# Patient Record
Sex: MALE | Race: WHITE | Employment: OTHER | URBAN - METROPOLITAN AREA
[De-identification: names, ages, dates, MRNs, and addresses within clinical notes are randomized per-mention and may not be internally consistent; named-entity substitution may affect disease eponyms.]

---

## 2017-01-06 ENCOUNTER — TELEPHONE (OUTPATIENT)
Dept: CARDIOLOGY CLINIC | Age: 82
End: 2017-01-06

## 2017-01-16 ASSESSMENT — ENCOUNTER SYMPTOMS
ABDOMINAL PAIN: 0
SHORTNESS OF BREATH: 0

## 2017-01-18 ENCOUNTER — OFFICE VISIT (OUTPATIENT)
Dept: INTERNAL MEDICINE CLINIC | Age: 82
End: 2017-01-18

## 2017-01-18 VITALS
DIASTOLIC BLOOD PRESSURE: 80 MMHG | RESPIRATION RATE: 16 BRPM | HEIGHT: 73 IN | BODY MASS INDEX: 25.84 KG/M2 | WEIGHT: 195 LBS | SYSTOLIC BLOOD PRESSURE: 130 MMHG | HEART RATE: 84 BPM

## 2017-01-18 DIAGNOSIS — I50.23 ACUTE ON CHRONIC SYSTOLIC CONGESTIVE HEART FAILURE (HCC): Primary | ICD-10-CM

## 2017-01-18 DIAGNOSIS — I10 ESSENTIAL HYPERTENSION: ICD-10-CM

## 2017-01-18 DIAGNOSIS — L98.9 SKIN LESION OF FACE: ICD-10-CM

## 2017-01-18 DIAGNOSIS — E78.00 HYPERCHOLESTEROLEMIA: ICD-10-CM

## 2017-01-18 LAB
A/G RATIO: 1.9 (ref 1.1–2.2)
ALBUMIN SERPL-MCNC: 4.4 G/DL (ref 3.4–5)
ALP BLD-CCNC: 78 U/L (ref 40–129)
ALT SERPL-CCNC: 10 U/L (ref 10–40)
ANION GAP SERPL CALCULATED.3IONS-SCNC: 15 MMOL/L (ref 3–16)
AST SERPL-CCNC: 18 U/L (ref 15–37)
BILIRUB SERPL-MCNC: 0.6 MG/DL (ref 0–1)
BUN BLDV-MCNC: 24 MG/DL (ref 7–20)
CALCIUM SERPL-MCNC: 8.9 MG/DL (ref 8.3–10.6)
CHLORIDE BLD-SCNC: 101 MMOL/L (ref 99–110)
CHOLESTEROL, TOTAL: 229 MG/DL (ref 0–199)
CO2: 24 MMOL/L (ref 21–32)
CREAT SERPL-MCNC: 1 MG/DL (ref 0.8–1.3)
GFR AFRICAN AMERICAN: >60
GFR NON-AFRICAN AMERICAN: >60
GLOBULIN: 2.3 G/DL
GLUCOSE BLD-MCNC: 96 MG/DL (ref 70–99)
HDLC SERPL-MCNC: 50 MG/DL (ref 40–60)
LDL CHOLESTEROL CALCULATED: 156 MG/DL
POTASSIUM SERPL-SCNC: 4.3 MMOL/L (ref 3.5–5.1)
SODIUM BLD-SCNC: 140 MMOL/L (ref 136–145)
TOTAL PROTEIN: 6.7 G/DL (ref 6.4–8.2)
TRIGL SERPL-MCNC: 114 MG/DL (ref 0–150)
VLDLC SERPL CALC-MCNC: 23 MG/DL

## 2017-01-18 PROCEDURE — 99214 OFFICE O/P EST MOD 30 MIN: CPT | Performed by: INTERNAL MEDICINE

## 2017-01-19 DIAGNOSIS — E78.00 HYPERCHOLESTEROLEMIA: Primary | ICD-10-CM

## 2017-02-07 ENCOUNTER — NURSE ONLY (OUTPATIENT)
Dept: CARDIOLOGY CLINIC | Age: 82
End: 2017-02-07

## 2017-02-07 DIAGNOSIS — I50.23 ACUTE ON CHRONIC SYSTOLIC CONGESTIVE HEART FAILURE (HCC): ICD-10-CM

## 2017-02-07 DIAGNOSIS — Z95.810 BIVENTRICULAR ICD (IMPLANTABLE CARDIOVERTER-DEFIBRILLATOR) IN PLACE: Primary | ICD-10-CM

## 2017-02-07 DIAGNOSIS — I42.9 CARDIOMYOPATHY (HCC): ICD-10-CM

## 2017-02-07 PROCEDURE — 93296 REM INTERROG EVL PM/IDS: CPT | Performed by: INTERNAL MEDICINE

## 2017-02-07 PROCEDURE — 93297 REM INTERROG DEV EVAL ICPMS: CPT | Performed by: INTERNAL MEDICINE

## 2017-02-07 PROCEDURE — 93295 DEV INTERROG REMOTE 1/2/MLT: CPT | Performed by: INTERNAL MEDICINE

## 2017-02-08 ENCOUNTER — OFFICE VISIT (OUTPATIENT)
Dept: CARDIOLOGY CLINIC | Age: 82
End: 2017-02-08

## 2017-02-08 VITALS
HEART RATE: 53 BPM | DIASTOLIC BLOOD PRESSURE: 84 MMHG | SYSTOLIC BLOOD PRESSURE: 116 MMHG | BODY MASS INDEX: 26.24 KG/M2 | OXYGEN SATURATION: 95 % | WEIGHT: 198 LBS | HEIGHT: 73 IN

## 2017-02-08 DIAGNOSIS — I25.10 CORONARY ARTERY DISEASE INVOLVING NATIVE CORONARY ARTERY OF NATIVE HEART WITHOUT ANGINA PECTORIS: Primary | ICD-10-CM

## 2017-02-08 PROCEDURE — 99214 OFFICE O/P EST MOD 30 MIN: CPT | Performed by: INTERNAL MEDICINE

## 2017-03-21 DIAGNOSIS — I48.0 PAROXYSMAL ATRIAL FIBRILLATION (HCC): Primary | ICD-10-CM

## 2017-03-22 ENCOUNTER — TELEPHONE (OUTPATIENT)
Dept: CARDIOLOGY CLINIC | Age: 82
End: 2017-03-22

## 2017-03-28 ENCOUNTER — PROCEDURE VISIT (OUTPATIENT)
Dept: CARDIOLOGY CLINIC | Age: 82
End: 2017-03-28

## 2017-03-28 DIAGNOSIS — I42.9 CARDIOMYOPATHY (HCC): ICD-10-CM

## 2017-03-28 DIAGNOSIS — I50.22 CHRONIC SYSTOLIC CONGESTIVE HEART FAILURE (HCC): ICD-10-CM

## 2017-03-28 DIAGNOSIS — Z95.810 BIVENTRICULAR ICD (IMPLANTABLE CARDIOVERTER-DEFIBRILLATOR) IN PLACE: Primary | ICD-10-CM

## 2017-03-28 PROCEDURE — 93290 INTERROG DEV EVAL ICPMS IP: CPT | Performed by: INTERNAL MEDICINE

## 2017-03-28 PROCEDURE — 93284 PRGRMG EVAL IMPLANTABLE DFB: CPT | Performed by: INTERNAL MEDICINE

## 2017-03-29 ENCOUNTER — TELEPHONE (OUTPATIENT)
Dept: CARDIOLOGY CLINIC | Age: 82
End: 2017-03-29

## 2017-03-29 PROBLEM — I47.1 ATRIAL TACHYCARDIA (HCC): Status: ACTIVE | Noted: 2017-03-29

## 2017-03-29 PROBLEM — I48.19 PERSISTENT ATRIAL FIBRILLATION (HCC): Status: ACTIVE | Noted: 2017-03-29

## 2017-04-03 ENCOUNTER — TELEPHONE (OUTPATIENT)
Dept: CARDIAC REHAB | Age: 82
End: 2017-04-03

## 2017-04-04 ENCOUNTER — OFFICE VISIT (OUTPATIENT)
Dept: CARDIOLOGY CLINIC | Age: 82
End: 2017-04-04

## 2017-04-04 VITALS
SYSTOLIC BLOOD PRESSURE: 122 MMHG | OXYGEN SATURATION: 98 % | WEIGHT: 194.6 LBS | BODY MASS INDEX: 25.79 KG/M2 | HEIGHT: 73 IN | DIASTOLIC BLOOD PRESSURE: 62 MMHG | HEART RATE: 91 BPM

## 2017-04-04 DIAGNOSIS — I48.19 PERSISTENT ATRIAL FIBRILLATION (HCC): ICD-10-CM

## 2017-04-04 DIAGNOSIS — I25.10 CORONARY ARTERY DISEASE INVOLVING NATIVE CORONARY ARTERY OF NATIVE HEART WITHOUT ANGINA PECTORIS: ICD-10-CM

## 2017-04-04 DIAGNOSIS — I50.22 CHRONIC SYSTOLIC CONGESTIVE HEART FAILURE (HCC): Primary | ICD-10-CM

## 2017-04-04 DIAGNOSIS — I10 ESSENTIAL HYPERTENSION: ICD-10-CM

## 2017-04-04 DIAGNOSIS — I42.8 NON-ISCHEMIC CARDIOMYOPATHY (HCC): ICD-10-CM

## 2017-04-04 PROCEDURE — 99214 OFFICE O/P EST MOD 30 MIN: CPT | Performed by: NURSE PRACTITIONER

## 2017-04-04 RX ORDER — LISINOPRIL 5 MG/1
5 TABLET ORAL DAILY
Qty: 30 TABLET | Refills: 3 | Status: SHIPPED | OUTPATIENT
Start: 2017-04-04 | End: 2017-05-16 | Stop reason: ALTCHOICE

## 2017-04-05 ENCOUNTER — TELEPHONE (OUTPATIENT)
Dept: CARDIOLOGY CLINIC | Age: 82
End: 2017-04-05

## 2017-04-11 ENCOUNTER — TELEPHONE (OUTPATIENT)
Dept: CARDIOLOGY CLINIC | Age: 82
End: 2017-04-11

## 2017-04-12 ENCOUNTER — HOSPITAL ENCOUNTER (OUTPATIENT)
Dept: OTHER | Age: 82
Discharge: HOME | End: 2017-04-12
Admitting: NURSE PRACTITIONER

## 2017-04-12 ENCOUNTER — HOSPITAL ENCOUNTER (OUTPATIENT)
Dept: NUCLEAR MEDICINE | Age: 82
Discharge: HOME | End: 2017-04-12
Attending: NURSE PRACTITIONER | Admitting: NURSE PRACTITIONER

## 2017-04-12 VITALS
HEART RATE: 79 BPM | SYSTOLIC BLOOD PRESSURE: 125 MMHG | DIASTOLIC BLOOD PRESSURE: 90 MMHG | TEMPERATURE: 97.6 F | RESPIRATION RATE: 15 BRPM | WEIGHT: 186 LBS | BODY MASS INDEX: 24.54 KG/M2

## 2017-04-12 DIAGNOSIS — I50.22 CHRONIC SYSTOLIC CONGESTIVE HEART FAILURE (HCC): ICD-10-CM

## 2017-04-12 LAB
LV EF: 24 %
LVEF MODALITY: NORMAL

## 2017-04-12 ASSESSMENT — PAIN - FUNCTIONAL ASSESSMENT
PAIN_FUNCTIONAL_ASSESSMENT: 0-10
PAIN_FUNCTIONAL_ASSESSMENT: 0-10

## 2017-04-19 ENCOUNTER — OFFICE VISIT (OUTPATIENT)
Dept: CARDIOLOGY CLINIC | Age: 82
End: 2017-04-19

## 2017-04-19 ENCOUNTER — PROCEDURE VISIT (OUTPATIENT)
Dept: CARDIOLOGY CLINIC | Age: 82
End: 2017-04-19

## 2017-04-19 VITALS
HEART RATE: 80 BPM | DIASTOLIC BLOOD PRESSURE: 64 MMHG | SYSTOLIC BLOOD PRESSURE: 94 MMHG | BODY MASS INDEX: 25.31 KG/M2 | WEIGHT: 191 LBS | OXYGEN SATURATION: 98 % | HEIGHT: 73 IN

## 2017-04-19 DIAGNOSIS — Z95.810 BIVENTRICULAR ICD (IMPLANTABLE CARDIOVERTER-DEFIBRILLATOR) IN PLACE: Primary | ICD-10-CM

## 2017-04-19 DIAGNOSIS — I47.1 SVT (SUPRAVENTRICULAR TACHYCARDIA) (HCC): ICD-10-CM

## 2017-04-19 DIAGNOSIS — I48.0 PAROXYSMAL ATRIAL FIBRILLATION (HCC): ICD-10-CM

## 2017-04-19 DIAGNOSIS — I47.1 ATRIAL TACHYCARDIA (HCC): Primary | ICD-10-CM

## 2017-04-19 DIAGNOSIS — I42.9 CARDIOMYOPATHY (HCC): ICD-10-CM

## 2017-04-19 DIAGNOSIS — I50.22 CHRONIC SYSTOLIC CONGESTIVE HEART FAILURE (HCC): ICD-10-CM

## 2017-04-19 DIAGNOSIS — I10 ESSENTIAL HYPERTENSION: ICD-10-CM

## 2017-04-19 PROCEDURE — 93284 PRGRMG EVAL IMPLANTABLE DFB: CPT | Performed by: INTERNAL MEDICINE

## 2017-04-19 PROCEDURE — 93290 INTERROG DEV EVAL ICPMS IP: CPT | Performed by: INTERNAL MEDICINE

## 2017-04-19 PROCEDURE — 99214 OFFICE O/P EST MOD 30 MIN: CPT | Performed by: NURSE PRACTITIONER

## 2017-04-19 RX ORDER — AMIODARONE HYDROCHLORIDE 200 MG/1
200 TABLET ORAL DAILY
Qty: 30 TABLET | Refills: 3 | Status: SHIPPED
Start: 2017-04-19 | End: 2017-11-28 | Stop reason: ALTCHOICE

## 2017-05-08 ENCOUNTER — TELEPHONE (OUTPATIENT)
Dept: CARDIOLOGY | Age: 82
End: 2017-05-08

## 2017-05-12 ENCOUNTER — TELEPHONE (OUTPATIENT)
Dept: CARDIOLOGY CLINIC | Age: 82
End: 2017-05-12

## 2017-05-16 ENCOUNTER — PROCEDURE VISIT (OUTPATIENT)
Dept: CARDIOLOGY CLINIC | Age: 82
End: 2017-05-16

## 2017-05-16 ENCOUNTER — OFFICE VISIT (OUTPATIENT)
Dept: CARDIOLOGY CLINIC | Age: 82
End: 2017-05-16

## 2017-05-16 VITALS
HEART RATE: 81 BPM | HEIGHT: 73 IN | SYSTOLIC BLOOD PRESSURE: 110 MMHG | OXYGEN SATURATION: 98 % | BODY MASS INDEX: 24.92 KG/M2 | DIASTOLIC BLOOD PRESSURE: 76 MMHG | WEIGHT: 188 LBS

## 2017-05-16 DIAGNOSIS — Z95.810 BIVENTRICULAR ICD (IMPLANTABLE CARDIOVERTER-DEFIBRILLATOR) IN PLACE: Primary | ICD-10-CM

## 2017-05-16 DIAGNOSIS — R42 DIZZINESS: Primary | ICD-10-CM

## 2017-05-16 PROCEDURE — 93284 PRGRMG EVAL IMPLANTABLE DFB: CPT | Performed by: INTERNAL MEDICINE

## 2017-05-16 PROCEDURE — 99214 OFFICE O/P EST MOD 30 MIN: CPT | Performed by: INTERNAL MEDICINE

## 2017-05-16 RX ORDER — FLUDROCORTISONE ACETATE 0.1 MG/1
0.1 TABLET ORAL DAILY
Qty: 30 TABLET | Refills: 3 | Status: SHIPPED | OUTPATIENT
Start: 2017-05-16 | End: 2017-09-06 | Stop reason: SDUPTHER

## 2017-06-08 ENCOUNTER — PROCEDURE VISIT (OUTPATIENT)
Dept: CARDIOLOGY CLINIC | Age: 82
End: 2017-06-08

## 2017-06-08 ENCOUNTER — OFFICE VISIT (OUTPATIENT)
Dept: CARDIOLOGY CLINIC | Age: 82
End: 2017-06-08

## 2017-06-08 VITALS
HEART RATE: 80 BPM | BODY MASS INDEX: 24.52 KG/M2 | DIASTOLIC BLOOD PRESSURE: 80 MMHG | WEIGHT: 185 LBS | SYSTOLIC BLOOD PRESSURE: 110 MMHG | HEIGHT: 73 IN | OXYGEN SATURATION: 97 %

## 2017-06-08 DIAGNOSIS — I50.22 CHRONIC SYSTOLIC CONGESTIVE HEART FAILURE (HCC): ICD-10-CM

## 2017-06-08 DIAGNOSIS — Z95.810 BIVENTRICULAR ICD (IMPLANTABLE CARDIOVERTER-DEFIBRILLATOR) IN PLACE: Primary | ICD-10-CM

## 2017-06-08 DIAGNOSIS — I48.19 PERSISTENT ATRIAL FIBRILLATION (HCC): Primary | ICD-10-CM

## 2017-06-08 DIAGNOSIS — I42.9 CARDIOMYOPATHY (HCC): ICD-10-CM

## 2017-06-08 PROCEDURE — 99214 OFFICE O/P EST MOD 30 MIN: CPT | Performed by: INTERNAL MEDICINE

## 2017-06-08 PROCEDURE — 93284 PRGRMG EVAL IMPLANTABLE DFB: CPT | Performed by: INTERNAL MEDICINE

## 2017-06-08 PROCEDURE — 93290 INTERROG DEV EVAL ICPMS IP: CPT | Performed by: INTERNAL MEDICINE

## 2017-06-09 ENCOUNTER — TELEPHONE (OUTPATIENT)
Dept: INTERNAL MEDICINE CLINIC | Age: 82
End: 2017-06-09

## 2017-06-09 ENCOUNTER — TELEPHONE (OUTPATIENT)
Dept: CARDIOLOGY CLINIC | Age: 82
End: 2017-06-09

## 2017-06-14 ENCOUNTER — TELEPHONE (OUTPATIENT)
Dept: CARDIOLOGY CLINIC | Age: 82
End: 2017-06-14

## 2017-06-30 ENCOUNTER — TELEPHONE (OUTPATIENT)
Dept: CARDIOLOGY CLINIC | Age: 82
End: 2017-06-30

## 2017-07-10 ENCOUNTER — PROCEDURE VISIT (OUTPATIENT)
Dept: CARDIOLOGY CLINIC | Age: 82
End: 2017-07-10

## 2017-07-10 DIAGNOSIS — I42.9 CARDIOMYOPATHY (HCC): ICD-10-CM

## 2017-07-10 DIAGNOSIS — Z95.810 BIVENTRICULAR ICD (IMPLANTABLE CARDIOVERTER-DEFIBRILLATOR) IN PLACE: Primary | ICD-10-CM

## 2017-07-14 ASSESSMENT — ENCOUNTER SYMPTOMS
ABDOMINAL PAIN: 0
SHORTNESS OF BREATH: 0

## 2017-07-19 ENCOUNTER — OFFICE VISIT (OUTPATIENT)
Dept: INTERNAL MEDICINE CLINIC | Age: 82
End: 2017-07-19

## 2017-07-19 VITALS
WEIGHT: 189 LBS | SYSTOLIC BLOOD PRESSURE: 130 MMHG | RESPIRATION RATE: 16 BRPM | HEIGHT: 73 IN | DIASTOLIC BLOOD PRESSURE: 72 MMHG | HEART RATE: 72 BPM | BODY MASS INDEX: 25.05 KG/M2

## 2017-07-19 DIAGNOSIS — I48.19 PERSISTENT ATRIAL FIBRILLATION (HCC): ICD-10-CM

## 2017-07-19 DIAGNOSIS — Z23 NEED FOR PNEUMOCOCCAL VACCINATION: ICD-10-CM

## 2017-07-19 DIAGNOSIS — I10 ESSENTIAL HYPERTENSION: ICD-10-CM

## 2017-07-19 DIAGNOSIS — E78.00 HYPERCHOLESTEROLEMIA: ICD-10-CM

## 2017-07-19 DIAGNOSIS — I50.22 CHRONIC SYSTOLIC CONGESTIVE HEART FAILURE (HCC): Primary | ICD-10-CM

## 2017-07-19 DIAGNOSIS — R73.09 ELEVATED GLUCOSE: ICD-10-CM

## 2017-07-19 DIAGNOSIS — E74.39 GLUCOSE INTOLERANCE (MALABSORPTION): ICD-10-CM

## 2017-07-19 LAB
A/G RATIO: 1.8 (ref 1.1–2.2)
ALBUMIN SERPL-MCNC: 4.2 G/DL (ref 3.4–5)
ALP BLD-CCNC: 67 U/L (ref 40–129)
ALT SERPL-CCNC: 11 U/L (ref 10–40)
ANION GAP SERPL CALCULATED.3IONS-SCNC: 16 MMOL/L (ref 3–16)
AST SERPL-CCNC: 17 U/L (ref 15–37)
BILIRUB SERPL-MCNC: 0.8 MG/DL (ref 0–1)
BUN BLDV-MCNC: 26 MG/DL (ref 7–20)
CALCIUM SERPL-MCNC: 9.1 MG/DL (ref 8.3–10.6)
CHLORIDE BLD-SCNC: 100 MMOL/L (ref 99–110)
CHOLESTEROL, TOTAL: 228 MG/DL (ref 0–199)
CO2: 25 MMOL/L (ref 21–32)
CREAT SERPL-MCNC: 1.1 MG/DL (ref 0.8–1.3)
GFR AFRICAN AMERICAN: >60
GFR NON-AFRICAN AMERICAN: >60
GLOBULIN: 2.4 G/DL
GLUCOSE BLD-MCNC: 95 MG/DL (ref 70–99)
HDLC SERPL-MCNC: 59 MG/DL (ref 40–60)
LDL CHOLESTEROL CALCULATED: 154 MG/DL
POTASSIUM SERPL-SCNC: 4 MMOL/L (ref 3.5–5.1)
SODIUM BLD-SCNC: 141 MMOL/L (ref 136–145)
TOTAL PROTEIN: 6.6 G/DL (ref 6.4–8.2)
TRIGL SERPL-MCNC: 73 MG/DL (ref 0–150)
VLDLC SERPL CALC-MCNC: 15 MG/DL

## 2017-07-19 PROCEDURE — 90732 PPSV23 VACC 2 YRS+ SUBQ/IM: CPT | Performed by: INTERNAL MEDICINE

## 2017-07-19 PROCEDURE — 99214 OFFICE O/P EST MOD 30 MIN: CPT | Performed by: INTERNAL MEDICINE

## 2017-07-19 PROCEDURE — G0009 ADMIN PNEUMOCOCCAL VACCINE: HCPCS | Performed by: INTERNAL MEDICINE

## 2017-07-19 RX ORDER — FLUDROCORTISONE ACETATE 0.1 MG/1
0.1 TABLET ORAL DAILY
COMMUNITY
Start: 2017-07-03 | End: 2017-11-28 | Stop reason: SDUPTHER

## 2017-07-20 LAB
ESTIMATED AVERAGE GLUCOSE: 122.6 MG/DL
HBA1C MFR BLD: 5.9 %

## 2017-08-17 ENCOUNTER — OFFICE VISIT (OUTPATIENT)
Dept: DERMATOLOGY | Age: 82
End: 2017-08-17

## 2017-08-17 DIAGNOSIS — Z12.83 SCREENING EXAM FOR SKIN CANCER: ICD-10-CM

## 2017-08-17 DIAGNOSIS — Z85.828 HISTORY OF NONMELANOMA SKIN CANCER: ICD-10-CM

## 2017-08-17 DIAGNOSIS — L57.0 ACTINIC KERATOSES: Primary | ICD-10-CM

## 2017-08-17 PROCEDURE — 17000 DESTRUCT PREMALG LESION: CPT | Performed by: DERMATOLOGY

## 2017-08-17 PROCEDURE — 17003 DESTRUCT PREMALG LES 2-14: CPT | Performed by: DERMATOLOGY

## 2017-08-17 PROCEDURE — 99203 OFFICE O/P NEW LOW 30 MIN: CPT | Performed by: DERMATOLOGY

## 2017-08-28 ENCOUNTER — OFFICE VISIT (OUTPATIENT)
Dept: CARDIOLOGY CLINIC | Age: 82
End: 2017-08-28

## 2017-08-28 VITALS
SYSTOLIC BLOOD PRESSURE: 110 MMHG | HEART RATE: 72 BPM | DIASTOLIC BLOOD PRESSURE: 64 MMHG | WEIGHT: 194 LBS | BODY MASS INDEX: 25.71 KG/M2 | HEIGHT: 73 IN

## 2017-08-28 DIAGNOSIS — I48.0 PAROXYSMAL ATRIAL FIBRILLATION (HCC): ICD-10-CM

## 2017-08-28 DIAGNOSIS — E78.2 MIXED HYPERLIPIDEMIA: ICD-10-CM

## 2017-08-28 DIAGNOSIS — I50.22 CHRONIC SYSTOLIC CONGESTIVE HEART FAILURE (HCC): ICD-10-CM

## 2017-08-28 DIAGNOSIS — I25.10 CORONARY ARTERY DISEASE INVOLVING NATIVE CORONARY ARTERY OF NATIVE HEART WITHOUT ANGINA PECTORIS: Primary | ICD-10-CM

## 2017-08-28 DIAGNOSIS — I44.7 LBBB (LEFT BUNDLE BRANCH BLOCK): ICD-10-CM

## 2017-08-28 DIAGNOSIS — I10 ESSENTIAL HYPERTENSION: ICD-10-CM

## 2017-08-28 DIAGNOSIS — I47.1 SVT (SUPRAVENTRICULAR TACHYCARDIA) (HCC): ICD-10-CM

## 2017-08-28 PROCEDURE — 99214 OFFICE O/P EST MOD 30 MIN: CPT | Performed by: INTERNAL MEDICINE

## 2017-09-01 ENCOUNTER — OFFICE VISIT (OUTPATIENT)
Dept: CARDIOLOGY CLINIC | Age: 82
End: 2017-09-01

## 2017-09-01 ENCOUNTER — PROCEDURE VISIT (OUTPATIENT)
Dept: CARDIOLOGY CLINIC | Age: 82
End: 2017-09-01

## 2017-09-01 VITALS
OXYGEN SATURATION: 99 % | WEIGHT: 194 LBS | SYSTOLIC BLOOD PRESSURE: 122 MMHG | DIASTOLIC BLOOD PRESSURE: 76 MMHG | HEART RATE: 69 BPM | BODY MASS INDEX: 25.71 KG/M2 | HEIGHT: 73 IN

## 2017-09-01 DIAGNOSIS — I42.9 CARDIOMYOPATHY, UNSPECIFIED TYPE (HCC): ICD-10-CM

## 2017-09-01 DIAGNOSIS — Z95.810 BIVENTRICULAR ICD (IMPLANTABLE CARDIOVERTER-DEFIBRILLATOR) IN PLACE: Primary | ICD-10-CM

## 2017-09-01 DIAGNOSIS — I50.22 CHRONIC SYSTOLIC CONGESTIVE HEART FAILURE (HCC): ICD-10-CM

## 2017-09-01 DIAGNOSIS — I48.19 PERSISTENT ATRIAL FIBRILLATION (HCC): Primary | ICD-10-CM

## 2017-09-01 DIAGNOSIS — I47.1 SVT (SUPRAVENTRICULAR TACHYCARDIA) (HCC): ICD-10-CM

## 2017-09-01 PROCEDURE — 93290 INTERROG DEV EVAL ICPMS IP: CPT | Performed by: INTERNAL MEDICINE

## 2017-09-01 PROCEDURE — 99214 OFFICE O/P EST MOD 30 MIN: CPT | Performed by: INTERNAL MEDICINE

## 2017-09-01 PROCEDURE — 93284 PRGRMG EVAL IMPLANTABLE DFB: CPT | Performed by: INTERNAL MEDICINE

## 2017-09-06 RX ORDER — FLUDROCORTISONE ACETATE 0.1 MG/1
TABLET ORAL
Qty: 30 TABLET | Refills: 3 | Status: SHIPPED | OUTPATIENT
Start: 2017-09-06 | End: 2017-11-28 | Stop reason: SDUPTHER

## 2017-10-19 ENCOUNTER — TELEPHONE (OUTPATIENT)
Dept: DERMATOLOGY | Age: 82
End: 2017-10-19

## 2017-10-19 ENCOUNTER — PROCEDURE VISIT (OUTPATIENT)
Dept: DERMATOLOGY | Age: 82
End: 2017-10-19

## 2017-10-19 DIAGNOSIS — L57.0 ACTINIC KERATOSES: Primary | ICD-10-CM

## 2017-10-19 PROCEDURE — 17004 DESTROY PREMAL LESIONS 15/>: CPT | Performed by: DERMATOLOGY

## 2017-10-19 NOTE — TELEPHONE ENCOUNTER
Pt calling states he has bumps back on his head pls call back patient to discuss @ [phone] thank you

## 2017-10-19 NOTE — PROGRESS NOTES
1 tablet by mouth daily as needed (for a 2 pound weight gain) 30 tablet 3    Aloe Vera 25 MG CAPS Take by mouth Not sure of dose      CINNAMON PO Take by mouth 2 times daily       multivitamin (THERAGRAN) per tablet Take 1 tablet by mouth daily.  Coenzyme Q10 (COQ-10 PO) Take  by mouth.  vitamin E 400 UNIT capsule Take 1 capsule by mouth daily. Social History: [facility] war  ([facility])    Physical Examination     The following were examined and determined to be normal: Psych/Neuro. The following were examined and determined to be abnormal: Scalp/hair, Head/face, RUE and LUE. -General: Well-appearing, NAD  1. Vertex scalp 6, R tragus 1, R 2 and L 2 temples, L forearm 2, dorsum R 1 and L 1 hands - ill-defined irregularly-shaped roughly-scaling thin pink macules/papules     Assessment and Plan     1. Actinic keratosis(es)  -Edu re: relationship with chronic cumulative sun exposure, low premalignant potential.   -15 lesion(s) treated w/ liquid nitrogen x 2 cycles - Vertex scalp 6, R tragus 1, R 2 and L 2 temples, L forearm 2, dorsum R 1 and L 1 hands. Edu re: risk of blister formation, discomfort, scar, hypopigmentation. Discussed wound care. -Reviewed sun protective behavior -- sun avoidance during the peak hours of the day, sun-protective clothing (including hat and sunglasses), sunscreen use (water resistant, broad spectrum, SPF at least 30, need for reapplication every 2 to 3 hours).    -Return for full skin exam in 1 year (sooner if indicated)

## 2017-11-01 ENCOUNTER — TELEPHONE (OUTPATIENT)
Dept: CARDIOLOGY CLINIC | Age: 82
End: 2017-11-01

## 2017-11-07 ENCOUNTER — PROCEDURE VISIT (OUTPATIENT)
Dept: CARDIOLOGY CLINIC | Age: 82
End: 2017-11-07

## 2017-11-07 ENCOUNTER — OFFICE VISIT (OUTPATIENT)
Dept: CARDIOLOGY CLINIC | Age: 82
End: 2017-11-07

## 2017-11-07 VITALS
WEIGHT: 194.5 LBS | BODY MASS INDEX: 25.78 KG/M2 | DIASTOLIC BLOOD PRESSURE: 72 MMHG | HEIGHT: 73 IN | OXYGEN SATURATION: 96 % | SYSTOLIC BLOOD PRESSURE: 130 MMHG | HEART RATE: 74 BPM

## 2017-11-07 DIAGNOSIS — I48.19 PERSISTENT ATRIAL FIBRILLATION (HCC): Primary | ICD-10-CM

## 2017-11-07 DIAGNOSIS — I42.9 CARDIOMYOPATHY, UNSPECIFIED TYPE (HCC): ICD-10-CM

## 2017-11-07 DIAGNOSIS — I50.22 CHRONIC SYSTOLIC CONGESTIVE HEART FAILURE (HCC): ICD-10-CM

## 2017-11-07 DIAGNOSIS — Z95.810 BIVENTRICULAR ICD (IMPLANTABLE CARDIOVERTER-DEFIBRILLATOR) IN PLACE: Primary | ICD-10-CM

## 2017-11-07 PROCEDURE — 93284 PRGRMG EVAL IMPLANTABLE DFB: CPT | Performed by: INTERNAL MEDICINE

## 2017-11-07 PROCEDURE — 99214 OFFICE O/P EST MOD 30 MIN: CPT | Performed by: INTERNAL MEDICINE

## 2017-11-07 PROCEDURE — 93290 INTERROG DEV EVAL ICPMS IP: CPT | Performed by: INTERNAL MEDICINE

## 2017-11-07 NOTE — PROGRESS NOTES
[address]   Cardiac follow up       HPI:  Byron Ritchie is a 80 y.o. male who presents for follow up of non ischemic cardiomyopathy and arrhythmia. His wife states he has had an irregular rhythm for years. However the EKGs available showed normal rhythm until 08/11/2015 which showed AF. Echo from 08/11/15 showed an EF of 30-35%. Eliquis was started on 08/12/15. He underwent successful cardioversion on 09/25/15. Amiodarone and coreg were stopped on 10/16/15 due to sinus bradycardia with a HR of 48   He underwent placement of BiV ICD 11/3/15 for primary SCA protection in the presence of NICM and class IIb CHF. On 12/18/15, device interrogation shows normal function, but multiple episodes of tachycardia which appear to be SVT. These occur at about 125 bpm and are associated with LH. 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 His 24 hr Holter monitor from 3/24/16 showed 41% PVC's. 60 second EKG strip in office shows very frequent PVC's of 3 distinct morphologies. He had an abnormal stress test on 07/08/16. He underwent a cardiac cath on 07/26/16 which resulted in a POBA of mid RCA. A stent could not be navigated to that area. He was started on Plavix  He was in the hospital on 3/28/17 for several days of SOB. Device check at that time showed 9 days of A fib. He underwent a cardioversion on 3/28/17. His device check today shows he has had some episodes of slow VT occurring in the evening. He is here for follow up for cardiomyopathy and device management. He states he  has been feeling well. His device check today showed normal pacing and sensing functions. He is BiV paced 94% of the time. He had increased VT detection after reprogramming his detection criteria. His device was reprogrammed on 6/8/17.  His amio was stopped on 9/1/17. He has had 23 episodes of VT which are slow (120 bpm) and are reliably terminated with ATP, usually the first attempt. Since being off the amio he states he diarrhea has improved. He also has had less dizziness. He has not some what active. He denies any chest pain, SOB, or syncope. Past Medical History:   has a past medical history of Arthritis; Atrial fibrillation ([facility] [facility] Cancer ([facility] Cardiomyopathy [facility]); CHF (congestive heart failure) ([facility] High cholesterol; Hypertension; LBBB (left bundle branch block); and MI (myocardial infarction). Surgical History:   has a past surgical history that includes hernia repair; Ankle surgery; eye surgery (2010); Cataract removal with implant (1/27/11); Cataract removal with implant (2/24/11); joint replacement (8-2008); Skin cancer excision; Cardioversion (9/25/2015); Pacemaker insertion (Left, 12/2015); and other surgical history (Bilateral, 12/16/2016). Social History:   reports that he quit smoking about 29 years ago. He has never used smokeless tobacco. He reports that he drinks alcohol. He reports that he does not use drugs. Family History:  family history includes Diabetes in his brother and mother; Heart Failure in his father and mother.      Home Medications:  Outpatient Encounter Prescriptions as of 11/7/2017   Medication Sig Dispense Refill    apixaban (ELIQUIS) 5 MG TABS tablet TAKE 1 TABLET BY MOUTH TWICE A DAY 60 tablet 11    metoprolol succinate (TOPROL XL) 25 MG extended release tablet 1/2 tab bid (Patient taking differently: 1/2 tab bid- does not take if bp is <100/60) 30 tablet 11    Red Yeast Rice Extract (RED YEAST RICE PO) Take by mouth      furosemide (LASIX) 20 MG tablet Take 1 tablet by mouth daily as needed (for a 2 pound weight gain) 30 tablet 3    Aloe Vera 25 MG CAPS Take by mouth Not sure of dose      CINNAMON PO Take by mouth 2 times daily       multivitamin (THERAGRAN) per tablet Take 1 tablet by mouth daily.        Coenzyme Q10 (COQ-10 PO) Take  by mouth.  vitamin E 400 UNIT capsule Take 1 capsule by mouth daily.  fludrocortisone (FLORINEF) 0.1 MG tablet TAKE 1 TABLET BY MOUTH DAILY 30 tablet 3    fludrocortisone (FLORINEF) 0.1 MG tablet Take 0.1 mg by mouth daily       amiodarone (CORDARONE) 200 MG tablet Take 1 tablet by mouth daily (Patient taking differently: Take 100 mg by mouth daily ) 30 tablet 3     No facility-administered encounter medications on file as of 11/7/2017. Allergies:  Flomax [tamsulosin hcl] and Statins support therapy     Review of Systems   Constitutional: Negative. HENT: Negative. Eyes: Negative. Respiratory: Negative. Cardiovascular: Negative. Gastrointestinal: Negative. Genitourinary: Negative. Musculoskeletal: Negative. Skin: Negative. Neurological: Negative. Hematological: Negative. Psychiatric/Behavioral: Negative. /72   Pulse 74   Ht 6' 1\" (1.854 m)   Wt 194 lb 8 oz (88.2 kg)   SpO2 96%   BMI 25.66 kg/m²       Objective:  Physical Exam   Constitutional: He is oriented to person, place, and time. He appears well-developed and well-nourished. HENT:   Head: Normocephalic and atraumatic. Eyes: Pupils are equal, round, and reactive to light. Neck: Normal range of motion. Cardiovascular: irregular rate, frequent PVCs and normal heart sounds. Pulmonary/Chest: Effort normal and breath sounds normal.   Abdominal: Soft. No tenderness. Musculoskeletal: Normal range of motion. He exhibits no edema. Neurological: He is alert and oriented to person, place, and time. Skin: Skin is warm and dry. Psychiatric: He has a normal mood and affect. Assessment:  1. Cardiomyopathy- non ischemic  2. VT-slow. He has done much better clinically after the amiodarone was stopped. He has fairly frequent episodes of VT which are more reliably recognized by his device and terminated with ATP. 3. HTN-stable        Plan:  1.

## 2017-11-07 NOTE — LETTER
[facility] - [address] [facility] [address]  Phone: [phone]  Fax: [phone]    Anastacia Herrera MD        November 8, 2017     Oc Bryson, [address] [place] [id number]    Patient: Lilia Velasquez  MR Number: [id number]  YOB: 1932  Date of Visit: 11/7/2017    Dear Dr. Oc Bryson:    Thank you for the request for consultation for Ariel Colin to me for the evaluation of Cardiomyopathy. Below are the relevant portions of my assessment and plan of care. Assessment:  1. Cardiomyopathy- non ischemic  2. VT-slow. He has done much better clinically after the amiodarone was stopped. He has fairly frequent episodes of VT which are more reliably recognized by his device and terminated with ATP. 3. HTN-stable           Plan:  1. Continue current medications   2. Stay active   3. Follow up with me in 6 months         Anastacia Herrera M.D. If you have questions, please do not hesitate to call me. I look forward to following [facility] along with you.     Sincerely,        Anastacia Herrera MD

## 2017-11-27 RX ORDER — APIXABAN 5 MG/1
TABLET, FILM COATED ORAL
Qty: 180 TABLET | Refills: 3 | Status: SHIPPED | OUTPATIENT
Start: 2017-11-27 | End: 2017-11-28 | Stop reason: SDUPTHER

## 2017-11-28 ENCOUNTER — OFFICE VISIT (OUTPATIENT)
Dept: CARDIOLOGY CLINIC | Age: 82
End: 2017-11-28

## 2017-11-28 VITALS
DIASTOLIC BLOOD PRESSURE: 60 MMHG | SYSTOLIC BLOOD PRESSURE: 102 MMHG | HEART RATE: 57 BPM | WEIGHT: 195.5 LBS | OXYGEN SATURATION: 97 % | HEIGHT: 73 IN | BODY MASS INDEX: 25.91 KG/M2

## 2017-11-28 DIAGNOSIS — E78.2 MIXED HYPERLIPIDEMIA: ICD-10-CM

## 2017-11-28 DIAGNOSIS — I42.8 NON-ISCHEMIC CARDIOMYOPATHY (HCC): ICD-10-CM

## 2017-11-28 DIAGNOSIS — I10 ESSENTIAL HYPERTENSION: ICD-10-CM

## 2017-11-28 DIAGNOSIS — I48.19 PERSISTENT ATRIAL FIBRILLATION (HCC): Primary | ICD-10-CM

## 2017-11-28 DIAGNOSIS — I25.10 CORONARY ARTERY DISEASE INVOLVING NATIVE CORONARY ARTERY OF NATIVE HEART WITHOUT ANGINA PECTORIS: ICD-10-CM

## 2017-11-28 DIAGNOSIS — I47.1 SVT (SUPRAVENTRICULAR TACHYCARDIA) (HCC): ICD-10-CM

## 2017-11-28 DIAGNOSIS — I50.22 CHRONIC SYSTOLIC CONGESTIVE HEART FAILURE (HCC): ICD-10-CM

## 2017-11-28 DIAGNOSIS — Z95.810 BIVENTRICULAR ICD (IMPLANTABLE CARDIOVERTER-DEFIBRILLATOR) IN PLACE: ICD-10-CM

## 2017-11-28 PROCEDURE — 99214 OFFICE O/P EST MOD 30 MIN: CPT | Performed by: NURSE PRACTITIONER

## 2017-11-28 RX ORDER — METOPROLOL SUCCINATE 25 MG/1
TABLET, EXTENDED RELEASE ORAL
Qty: 30 TABLET | Refills: 11
Start: 2017-11-28 | End: 2018-03-09 | Stop reason: SDUPTHER

## 2017-11-28 RX ORDER — FLUDROCORTISONE ACETATE 0.1 MG/1
0.1 TABLET ORAL DAILY
Qty: 30 TABLET | Refills: 3 | Status: SHIPPED | OUTPATIENT
Start: 2017-11-28 | End: 2018-05-22 | Stop reason: DRUGHIGH

## 2017-11-28 NOTE — PROGRESS NOTES
[address]   Cardiac Evaluation    Primary Care Doctor:  Mary Brunner MD    Chief Complaint   Patient presents with    Congestive Heart Failure    Atrial Fibrillation    Dizziness     when standing    Shortness of Breath     occ with exertion    Palpitations     occ        History of Present Illness:   I had the pleasure of seeing Bernice Swanson in follow up for NICM, CHF, CAD, HTN, PAF, s/p BiV ICD as well as HLD with statin induced hepatitis. He was started on Florinef for orthostatic hypotension which has improved his dizziness. He was also recently seen by EP who stopped the Amiodarone in September, also improved lightheadedness. He continues to have dizziness despite not taking any cardiac medications. He has dizziness first thing in the AM which persists after eating breakfast.  He reports not taking the Florinef thinking this was discontinued when the amiodarone was stopped. His weight at home was 182 lbs this am, ranges 181-185 lbs. He reports shortness of breath with shaving, uses chair lift now to get to 2 nd floor. He denies dyspnea on exertion with walking in from parking lot or orthopnea. He complains of fatigue with walking through back yard. He has not taken a dose of Lasix in months. Bernice Swanson describes symptoms including dyspnea, fatigue, lightheadedness but denies chest pain, palpitations, orthopnea, PND, early saiety, edema, syncope. NYHA:   IIb  ACC/ AHA Stage:    C    Past Medical History:   has a past medical history of Arthritis; Atrial fibrillation ([facility] [address] Cancer ([facility] Cardiomyopathy [facility]); CHF (congestive heart failure) ([facility] High cholesterol; Hypertension; LBBB (left bundle branch block); and MI (myocardial infarction). Surgical History:   has a past surgical history that includes hernia repair; Ankle surgery; eye surgery (2010); Cataract removal with implant (1/27/11); Cataract removal with implant (2/24/11); joint replacement (8-2008);  Skin Noted  Neurological/Psychiatric:  · Alert and oriented in all spheres  · Moves all extremities well  · Exhibits normal gait balance and coordination  · No abnormalities of mood, affect, memory, mentation, or behavior are noted    Lab Data:    CBC:   Lab Results   Component Value Date    WBC 8.0 03/28/2017    WBC 8.7 03/27/2017    WBC 7.3 07/27/2016    RBC 4.29 03/28/2017    RBC 4.52 03/27/2017    RBC 4.63 07/27/2016    HGB 13.9 03/28/2017    HGB 14.4 03/27/2017    HGB 15.3 07/27/2016    HCT 41.0 03/28/2017    HCT 43.3 03/27/2017    HCT 44.6 07/27/2016    MCV 95.4 03/28/2017    MCV 95.9 03/27/2017    MCV 96.5 07/27/2016    RDW 14.5 03/28/2017    RDW 14.5 03/27/2017    RDW 13.9 07/27/2016     03/28/2017     03/27/2017     07/27/2016     BMP:  Lab Results   Component Value Date     07/19/2017     03/28/2017     03/28/2017    K 4.0 07/19/2017    K 3.7 03/28/2017    K 4.0 03/28/2017     07/19/2017     03/28/2017     03/28/2017    CO2 25 07/19/2017    CO2 25 03/28/2017    CO2 26 03/28/2017    PHOS 3.5 08/12/2015    BUN 26 07/19/2017    BUN 22 03/28/2017    BUN 18 03/28/2017    CREATININE 1.1 07/19/2017    CREATININE 1.1 03/28/2017    CREATININE 1.0 03/28/2017     BNP:   Lab Results   Component Value Date    PROBNP 9,246 03/27/2017    PROBNP 3,784 08/21/2015    PROBNP 5,149 08/11/2015        Cardiac Imaging:  Stress test: 4/12/17   - Abnormal high risk myocardial perfusion study due to severe left  ventricular dysfunction.  - There is a medium size moderate intensity perfusion defect involving the  apex, apical inferior, mid inferoseptum and apical lateral walls during stress that does not improve at rest consistent with prior infarct. - There is a small size mild intensity perfusion defect involving the apical  anterior wall during stress that reverses at rest suggestive of ischemia. - Apical segments are akinetic. Septal motion consistent with paced rhythm. he is primary cardiologist   2. Cont ASA 81mg qday for now and effient 10mg po qday      Echo 8/11/15:   Ejection fraction is visually estimated to be 30-35 %. Moderate to severe global hypokinesis is present. Left ventricle size is normal.  Normal left ventricular wall thickness. Doppler study suggests diastolic dysfunction. The left atrium is mildly dilated by volume measurement. The right atrium is mildly dilated. The aortic root is mildly dilated. Aortic valve appears tricuspid and mildly sclerotic but opens adequately. There is trivial to mild tricuspid regurgitation. Systolic pulmonary artery pressure (SPAP) is normal and estimated at 38 mmHg (RA pressure 15 mmHg). Mild-to-moderate pulmonic regurgitation is present. Assessment:    1. Persistent atrial fibrillation ([address]    2. SVT (supraventricular tachycardia) ([address]    3. Chronic systolic congestive heart failure ([address]    4. Non-ischemic cardiomyopathy ([address]    5. Essential hypertension    6. Biventricular ICD (implantable cardioverter-defibrillator) in place    7. Coronary artery disease involving native coronary artery of native heart without angina pectoris    8. Mixed hyperlipidemia          Plan:   1. Start the Florinef 0.1 mg once daily  2. Take the metoprolol only if systolic BP is > 136   3. Use the furosemide (Lasix) if needed for swelling or weight > 185 lbs  4. Follow up with Dr. Dean Stafford in 3 months and Dr. Elaine Lawler in 5-6 months      I appreciate the opportunity of cooperating in the care of this individual.    Bridget Minor CNP, 11/28/2017, 2:22 PM    QUALITY MEASURES  1. Tobacco Cessation Counseling: NA  2. Retake of BP if >140/90:   NA  3. Documentation to PCP/referring for new patient:  Sent to PCP at close of office visit  4. CAD patient on anti-platelet: No (felt to be high risk since on anticoagulation)  5. CAD patient on STATIN therapy:  No (hx of statin induced hepatitis)  6.  Patient with CHF and aFib on anticoagulation: Yes

## 2018-01-15 ENCOUNTER — TELEPHONE (OUTPATIENT)
Dept: INTERNAL MEDICINE CLINIC | Age: 83
End: 2018-01-15

## 2018-01-15 DIAGNOSIS — E74.39 GLUCOSE INTOLERANCE (MALABSORPTION): ICD-10-CM

## 2018-01-15 DIAGNOSIS — E78.00 HYPERCHOLESTEROLEMIA: ICD-10-CM

## 2018-01-15 DIAGNOSIS — Z00.00 PREVENTATIVE HEALTH CARE: ICD-10-CM

## 2018-01-15 DIAGNOSIS — R73.09 BLOOD GLUCOSE ABNORMAL: ICD-10-CM

## 2018-01-15 DIAGNOSIS — I10 ESSENTIAL HYPERTENSION: Primary | ICD-10-CM

## 2018-01-15 ASSESSMENT — ENCOUNTER SYMPTOMS
ABDOMINAL PAIN: 0
SHORTNESS OF BREATH: 0

## 2018-01-15 NOTE — TELEPHONE ENCOUNTER
PT would like orders for his fasting labs entered in the system. PT is going to go to [address] [place] [address] to have them done. Please call PT when orders are put in system.

## 2018-01-16 ENCOUNTER — HOSPITAL ENCOUNTER (OUTPATIENT)
Dept: OTHER | Age: 83
Discharge: HOME | End: 2018-01-16
Attending: INTERNAL MEDICINE | Admitting: INTERNAL MEDICINE

## 2018-01-16 LAB
A/G RATIO: 1.4 (ref 1.1–2.2)
ALBUMIN SERPL-MCNC: 4 G/DL (ref 3.4–5)
ALP BLD-CCNC: 61 U/L (ref 40–129)
ALT SERPL-CCNC: 8 U/L (ref 10–40)
ANION GAP SERPL CALCULATED.3IONS-SCNC: 14 MMOL/L (ref 3–16)
AST SERPL-CCNC: 16 U/L (ref 15–37)
BILIRUB SERPL-MCNC: 0.8 MG/DL (ref 0–1)
BUN BLDV-MCNC: 23 MG/DL (ref 7–20)
CALCIUM SERPL-MCNC: 9.1 MG/DL (ref 8.3–10.6)
CHLORIDE BLD-SCNC: 104 MMOL/L (ref 99–110)
CHOLESTEROL, FASTING: 174 MG/DL (ref 0–199)
CO2: 26 MMOL/L (ref 21–32)
CREAT SERPL-MCNC: 1 MG/DL (ref 0.8–1.3)
GFR AFRICAN AMERICAN: >60
GFR NON-AFRICAN AMERICAN: >60
GLOBULIN: 2.9 G/DL
GLUCOSE FASTING: 97 MG/DL (ref 70–99)
HDLC SERPL-MCNC: 45 MG/DL (ref 40–60)
LDL CHOLESTEROL CALCULATED: 109 MG/DL
POTASSIUM SERPL-SCNC: 3.6 MMOL/L (ref 3.5–5.1)
SODIUM BLD-SCNC: 144 MMOL/L (ref 136–145)
TOTAL PROTEIN: 6.9 G/DL (ref 6.4–8.2)
TRIGLYCERIDE, FASTING: 101 MG/DL (ref 0–150)
VLDLC SERPL CALC-MCNC: 20 MG/DL

## 2018-01-17 LAB
ESTIMATED AVERAGE GLUCOSE: 125.5 MG/DL
HBA1C MFR BLD: 6 %

## 2018-01-19 ENCOUNTER — OFFICE VISIT (OUTPATIENT)
Dept: INTERNAL MEDICINE CLINIC | Age: 83
End: 2018-01-19

## 2018-01-19 VITALS
HEART RATE: 72 BPM | BODY MASS INDEX: 25.58 KG/M2 | DIASTOLIC BLOOD PRESSURE: 76 MMHG | WEIGHT: 193 LBS | SYSTOLIC BLOOD PRESSURE: 118 MMHG | HEIGHT: 73 IN | RESPIRATION RATE: 16 BRPM

## 2018-01-19 DIAGNOSIS — I10 ESSENTIAL HYPERTENSION: Primary | ICD-10-CM

## 2018-01-19 DIAGNOSIS — R73.09 BLOOD GLUCOSE ABNORMAL: ICD-10-CM

## 2018-01-19 DIAGNOSIS — E78.00 HYPERCHOLESTEROLEMIA: ICD-10-CM

## 2018-01-19 PROCEDURE — 99214 OFFICE O/P EST MOD 30 MIN: CPT | Performed by: INTERNAL MEDICINE

## 2018-02-05 ENCOUNTER — PROCEDURE VISIT (OUTPATIENT)
Dept: CARDIOLOGY CLINIC | Age: 83
End: 2018-02-05

## 2018-02-05 DIAGNOSIS — Z95.810 BIVENTRICULAR ICD (IMPLANTABLE CARDIOVERTER-DEFIBRILLATOR) IN PLACE: Primary | ICD-10-CM

## 2018-02-05 PROCEDURE — 93284 PRGRMG EVAL IMPLANTABLE DFB: CPT | Performed by: INTERNAL MEDICINE

## 2018-02-06 NOTE — PROGRESS NOTES
Patient comes in for programming evaluation for his biv-defibrillator. All sensing and pacing parameters are within normal range. No changes need to be made at this time. Please see interrogation for more detail. See arrhythmia list: 11/7/17-2/5/18  502 NSVT events   191 SVT episodes  61 VT episodes treated with ATP  Thoracic impedance trend stable. AT/AF burden 0.6%, he is on Eliquis.  Longest 10 hours 11-28-17, last paf 12/17/17  Patient will follow up with [facility] 2/9/18

## 2018-02-09 ENCOUNTER — OFFICE VISIT (OUTPATIENT)
Dept: CARDIOLOGY CLINIC | Age: 83
End: 2018-02-09

## 2018-02-09 ENCOUNTER — PROCEDURE VISIT (OUTPATIENT)
Dept: CARDIOLOGY CLINIC | Age: 83
End: 2018-02-09

## 2018-02-09 VITALS
OXYGEN SATURATION: 99 % | WEIGHT: 191 LBS | DIASTOLIC BLOOD PRESSURE: 80 MMHG | BODY MASS INDEX: 25.31 KG/M2 | SYSTOLIC BLOOD PRESSURE: 106 MMHG | HEIGHT: 73 IN | HEART RATE: 70 BPM

## 2018-02-09 DIAGNOSIS — Z95.810 BIVENTRICULAR ICD (IMPLANTABLE CARDIOVERTER-DEFIBRILLATOR) IN PLACE: Primary | ICD-10-CM

## 2018-02-09 DIAGNOSIS — I48.19 PERSISTENT ATRIAL FIBRILLATION (HCC): Primary | ICD-10-CM

## 2018-02-09 PROCEDURE — 99214 OFFICE O/P EST MOD 30 MIN: CPT | Performed by: INTERNAL MEDICINE

## 2018-02-09 PROCEDURE — 93284 PRGRMG EVAL IMPLANTABLE DFB: CPT | Performed by: INTERNAL MEDICINE

## 2018-02-09 NOTE — PROGRESS NOTES
terminated with ATP, usually the first attempt. Since being off the amio he states he diarrhea has improved. He also has had less dizziness. He has not some what active. His device check from today shows 502 NSVT events, 191 SVT episodes, 61 VT episodes treated with ATP, Thoracic impedance trend stable. AT/AF burden 0.6%, he is on Eliquis. Longest 10 hours 11-28-17, last paf 12/17/17, SINCE 2/5/18-see arrhythmia list 18 NSVT events  6 SVT episodes. Today he reports he continues to feel well. He has been asymptomatic with his irregularity. He has been taking his medications as prescribed. He has occasional dizziness. This occurs randomly and rarely. He denies chest pain, palpitations, syncope, shortness of breath or edema. Past Medical History:   has a past medical history of Arthritis; Atrial fibrillation ([facility] [address] Cancer ([facility] [address] Cardiomyopathy [facility]); [facility] High cholesterol; Hypertension; LBBB (left bundle branch block); and MI (myocardial infarction). Surgical History:   has a past surgical history that includes hernia repair; Ankle surgery; eye surgery (2010); Cataract removal with implant (1/27/11); Cataract removal with implant (2/24/11); joint replacement (8-2008); Skin cancer excision; Cardioversion (9/25/2015); Pacemaker insertion (Left, 12/2015); and other surgical history (Bilateral, 12/16/2016). Social History:   reports that he quit smoking about 30 years ago. He has never used smokeless tobacco. He reports that he drinks alcohol. He reports that he does not use drugs. Family History:  family history includes Diabetes in his brother and mother; Heart Failure in his father and mother.      Home Medications:  Outpatient Encounter Prescriptions as of 2/9/2018   Medication Sig Dispense Refill    fludrocortisone (FLORINEF) 0.1 MG tablet Take 1 tablet by mouth daily 30 tablet 3    apixaban (ELIQUIS) 5 MG TABS tablet TAKE 1 TABLET BY MOUTH TWICE A DAY 60 tablet 11

## 2018-02-15 NOTE — COMMUNICATION BODY
[facility]   Cardiac follow up       HPI:  Melonie Morgan is a 80 y.o. male who presents for follow up of non ischemic cardiomyopathy and arrhythmia. His wife states he has had an irregular rhythm for years. However the EKGs available showed normal rhythm until 08/11/2015 which showed AF. Echo from 08/11/15 showed an EF of 30-35%. Eliquis was started on 08/12/15. He underwent successful cardioversion on 09/25/15. Amiodarone and coreg were stopped on 10/16/15 due to sinus bradycardia with a HR of 48   He underwent placement of BiV ICD 11/3/15 for primary SCA protection in the presence of NICM and class IIb CHF. On 12/18/15, device interrogation shows normal function, but multiple episodes of tachycardia which appear to be SVT. These occur at about 125 bpm and are associated with LH. 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 His 24 hr Holter monitor from 3/24/16 showed 41% PVC's. 60 second EKG strip in office shows very frequent PVC's of 3 distinct morphologies. He had an abnormal stress test on 07/08/16. He underwent a cardiac cath on 07/26/16 which resulted in a POBA of mid RCA. A stent could not be navigated to that area. He was started on Plavix  He was in the hospital on 3/28/17 for several days of SOB. Device check at that time showed 9 days of A fib. He underwent a cardioversion on 3/28/17. His device check today shows he has had some episodes of slow VT occurring in the evening. His device check 11/7/17 showed normal pacing and sensing functions. He is BiV paced 94% of the time. He had increased VT detection after reprogramming his detection criteria. His device was reprogrammed on 6/8/17. His amio was stopped on 9/1/17.  He has had 23 episodes of VT which are slow (120 bpm) and are reliably terminated with ATP, usually the first attempt. Since being off the amio he states he diarrhea has improved. He also has had less dizziness. He has not some what active. His device check from today shows 502 NSVT events, 191 SVT episodes, 61 VT episodes treated with ATP, Thoracic impedance trend stable. AT/AF burden 0.6%, he is on Eliquis. Longest 10 hours 11-28-17, last paf 12/17/17, SINCE 2/5/18-see arrhythmia list 18 NSVT events  6 SVT episodes. Today he reports he continues to feel well. He has been asymptomatic with his irregularity. He has been taking his medications as prescribed. He has occasional dizziness. This occurs randomly and rarely. He denies chest pain, palpitations, syncope, shortness of breath or edema. Past Medical History:   has a past medical history of Arthritis; Atrial fibrillation ([facility] [address] [facility] Cancer ([facility] [address] Cardiomyopathy [facility]); [facility] High cholesterol; Hypertension; LBBB (left bundle branch block); and MI (myocardial infarction). Surgical History:   has a past surgical history that includes hernia repair; Ankle surgery; eye surgery (2010); Cataract removal with implant (1/27/11); Cataract removal with implant (2/24/11); joint replacement (8-2008); Skin cancer excision; Cardioversion (9/25/2015); Pacemaker insertion (Left, 12/2015); and other surgical history (Bilateral, 12/16/2016). Social History:   reports that he quit smoking about 30 years ago. He has never used smokeless tobacco. He reports that he drinks alcohol. He reports that he does not use drugs. Family History:  family history includes Diabetes in his brother and mother; Heart Failure in his father and mother.      Home Medications:  Outpatient Encounter Prescriptions as of 2/9/2018   Medication Sig Dispense Refill    fludrocortisone (FLORINEF) 0.1 MG tablet Take 1 tablet by mouth daily 30 tablet 3    apixaban (ELIQUIS) 5 MG TABS tablet TAKE 1 TABLET BY MOUTH TWICE A DAY 60 tablet 11 amiodarone was stopped. He has fairly frequent episodes of SVT which are more reliably recognized by his device and terminated with ATP. These are most likely PAT. He had an EP syudy which did not identify AV node reentry tachycardia or a concealed accessory pathway. The atrial tachycardia is too slow to be recognized by the mode switch algorithm. Ablation of the AV junction would not solve this problem very effectively as the device would still try to track the atrial tachycardia. For the time being, the best solution may be to allow antitachycardia pacing to terminate these episodes. 3. HTN-stable        Plan:  1. Continue current medications. 2. No changes warranted today. 3. Follow up with regular device checks. 4. Follow up with me in 3 months.      Daniel Rinne, M.D.

## 2018-03-05 ENCOUNTER — OFFICE VISIT (OUTPATIENT)
Dept: CARDIOLOGY CLINIC | Age: 83
End: 2018-03-05

## 2018-03-05 VITALS
OXYGEN SATURATION: 95 % | HEIGHT: 73 IN | BODY MASS INDEX: 25.31 KG/M2 | HEART RATE: 70 BPM | DIASTOLIC BLOOD PRESSURE: 70 MMHG | WEIGHT: 191 LBS | SYSTOLIC BLOOD PRESSURE: 108 MMHG

## 2018-03-05 DIAGNOSIS — I47.1 ATRIAL TACHYCARDIA (HCC): ICD-10-CM

## 2018-03-05 DIAGNOSIS — I10 ESSENTIAL HYPERTENSION: ICD-10-CM

## 2018-03-05 DIAGNOSIS — I25.10 CORONARY ARTERY DISEASE INVOLVING NATIVE CORONARY ARTERY OF NATIVE HEART WITHOUT ANGINA PECTORIS: Primary | ICD-10-CM

## 2018-03-05 DIAGNOSIS — E78.00 HYPERCHOLESTEROLEMIA: ICD-10-CM

## 2018-03-05 DIAGNOSIS — I42.9 CARDIOMYOPATHY, UNSPECIFIED TYPE (HCC): ICD-10-CM

## 2018-03-05 DIAGNOSIS — Z95.810 BIVENTRICULAR ICD (IMPLANTABLE CARDIOVERTER-DEFIBRILLATOR) IN PLACE: ICD-10-CM

## 2018-03-05 DIAGNOSIS — I48.19 PERSISTENT ATRIAL FIBRILLATION (HCC): ICD-10-CM

## 2018-03-05 DIAGNOSIS — I44.7 LBBB (LEFT BUNDLE BRANCH BLOCK): ICD-10-CM

## 2018-03-05 DIAGNOSIS — I47.1 SVT (SUPRAVENTRICULAR TACHYCARDIA) (HCC): ICD-10-CM

## 2018-03-05 DIAGNOSIS — I50.22 CHRONIC SYSTOLIC CONGESTIVE HEART FAILURE (HCC): ICD-10-CM

## 2018-03-05 PROCEDURE — 99214 OFFICE O/P EST MOD 30 MIN: CPT | Performed by: INTERNAL MEDICINE

## 2018-03-05 NOTE — PATIENT INSTRUCTIONS
Recommendation:  - His dizzy spells has mostly resolved since starting Florinef. He is following up Dr. Britney Leiva for his tachyarrhythmias and was seen last month. - His stress test showed no significant ischemia and as he is chest pain free, will continue to treat him medically. - He is off Asa to decrease risk of bleeding with Eliquis. - I instructed him to take Toprol if SBP stays > 110 mmHg. He is not on ACEI due to borderline BP.  - He is currently not on a statin due to statin-induced hepatitis. He is taking red yeast rice. Dr. Kenrick Poe is following his lipids. - I will have him see one of the NPs in four months and I will see him back in eight months.

## 2018-03-09 ENCOUNTER — TELEPHONE (OUTPATIENT)
Dept: CARDIOLOGY CLINIC | Age: 83
End: 2018-03-09

## 2018-03-09 ENCOUNTER — PROCEDURE VISIT (OUTPATIENT)
Dept: CARDIOLOGY CLINIC | Age: 83
End: 2018-03-09

## 2018-03-09 DIAGNOSIS — Z95.810 BIVENTRICULAR ICD (IMPLANTABLE CARDIOVERTER-DEFIBRILLATOR) IN PLACE: Primary | ICD-10-CM

## 2018-03-09 RX ORDER — METOPROLOL SUCCINATE 25 MG/1
TABLET, EXTENDED RELEASE ORAL
Qty: 30 TABLET | Refills: 11 | Status: SHIPPED | OUTPATIENT
Start: 2018-03-09 | End: 2018-03-23 | Stop reason: CLARIF

## 2018-03-09 NOTE — TELEPHONE ENCOUNTER
Patient wife called, she stated that Abhijeet Marie was at the post office and felt like his icd \"popped\" him. Advised to send transmission from carelink. carelink received and reviewed by RPS bc SANDRA OOT. Last check/ov with JMASHLEY 2/9/18. The VT episodes are AT/SVT per SANDRA. No changes made to the programming of the device. Since 2/9/18 he has had 102 SVT:VT/VF Rx withheld. 262 VT-NS (>4 beats, >105bpm)  29 treated VT episodes (ATP) and 1 inappropriate shock was given 3/9/18 for AT. He is on Eliquis for PAF and Toprol XL 25 mg 1/2 tab daily for SVT. I spoke with his wife and relayed the message from RPS to increase the Toprol XL to 1 tab daily instead of 1/2 tab daily. She stated that he has not takes the Toprol Xl in a few weeks bc his BP has been < 110. Per RPS resume the Toprol XL 25 mg 1/2 tab daily in the AM and follow up with SANDRA. Mrs Mccartney Danielle of the Toprol XL. I told her I would send a message to [facility] and we would follow up with her on Monday in regards to any other changes. OV with SANDRA/pacer check scheduled for 3/23/18 at 0915    Report scanned into media.

## 2018-03-23 ENCOUNTER — PROCEDURE VISIT (OUTPATIENT)
Dept: CARDIOLOGY CLINIC | Age: 83
End: 2018-03-23

## 2018-03-23 ENCOUNTER — TELEPHONE (OUTPATIENT)
Dept: CARDIOLOGY CLINIC | Age: 83
End: 2018-03-23

## 2018-03-23 ENCOUNTER — OFFICE VISIT (OUTPATIENT)
Dept: CARDIOLOGY CLINIC | Age: 83
End: 2018-03-23

## 2018-03-23 VITALS
DIASTOLIC BLOOD PRESSURE: 64 MMHG | WEIGHT: 190 LBS | HEART RATE: 70 BPM | HEIGHT: 73 IN | BODY MASS INDEX: 25.18 KG/M2 | SYSTOLIC BLOOD PRESSURE: 94 MMHG

## 2018-03-23 DIAGNOSIS — I47.1 ATRIAL TACHYCARDIA (HCC): ICD-10-CM

## 2018-03-23 DIAGNOSIS — I48.19 PERSISTENT ATRIAL FIBRILLATION (HCC): Primary | ICD-10-CM

## 2018-03-23 DIAGNOSIS — I50.22 CHRONIC SYSTOLIC CONGESTIVE HEART FAILURE (HCC): ICD-10-CM

## 2018-03-23 DIAGNOSIS — Z95.810 BIVENTRICULAR ICD (IMPLANTABLE CARDIOVERTER-DEFIBRILLATOR) IN PLACE: Primary | ICD-10-CM

## 2018-03-23 DIAGNOSIS — I42.9 CARDIOMYOPATHY, UNSPECIFIED TYPE (HCC): ICD-10-CM

## 2018-03-23 DIAGNOSIS — I47.1 SVT (SUPRAVENTRICULAR TACHYCARDIA) (HCC): ICD-10-CM

## 2018-03-23 PROCEDURE — 99214 OFFICE O/P EST MOD 30 MIN: CPT | Performed by: INTERNAL MEDICINE

## 2018-03-23 PROCEDURE — 93290 INTERROG DEV EVAL ICPMS IP: CPT | Performed by: INTERNAL MEDICINE

## 2018-03-23 PROCEDURE — 93284 PRGRMG EVAL IMPLANTABLE DFB: CPT | Performed by: INTERNAL MEDICINE

## 2018-03-23 RX ORDER — DILTIAZEM HYDROCHLORIDE 180 MG/1
180 CAPSULE, EXTENDED RELEASE ORAL DAILY
Qty: 30 CAPSULE | Refills: 3 | Status: ON HOLD | OUTPATIENT
Start: 2018-03-23 | End: 2018-04-16 | Stop reason: ALTCHOICE

## 2018-03-23 NOTE — PROGRESS NOTES
Patient comes in for programming evaluation for his defibrillator. All sensing and pacing parameters are within normal range. No changes need to be made at this time. Please see interrogation for more detail. On 3-9-18 he was inappropriately shocked for AT. Since 3/9/18 then he has had  116 new episodes. Since 2/9/18-last OV-he has had 336 VT-NS, 141 SVT, 40 ATP given and some events required up to 3 ATP. All the recordings show AT/SVT. Last PAF 3/17/18-he had PAF all day and the longest episodes was 4 hours. He is On Toprol and Eliquis. Thoracic impedance trend stable. BIV pacing 92%, VS 8%. His underlying is SA/SB, frequent PACS noted. Patient to see Dr. Pooja Moran today. Patient will follow up in 3 months in office or remotely.

## 2018-03-23 NOTE — PROGRESS NOTES
terminated with ATP, usually the first attempt. Since being off the amio he states he diarrhea has improved. He also has had less dizziness. He has not some what active. His device check from 2/9/2018 showed 502 NSVT events, 191 SVT episodes, 61 VT episodes treated with ATP, Thoracic impedance trend stable. AT/AF burden 0.6%, he is on Eliquis. Longest 10 hours 11-28-17, last paf 12/17/17, SINCE 2/5/18-see arrhythmia list 18 NSVT events 6 SVT episodes. Today he reports he continues to feel well. He has been asymptomatic with his irregularity. He has been taking his medications as prescribed. He has occasional dizziness. This occurs randomly and rarely. He denies chest pain, palpitations, syncope, shortness of breath or edema. His device check today, 3/23/2018, shows that he received an inappropriate shock on 3/9/2018 for AT. It also shows numerous episodes of tachycardia which were characterized as VT. He reports he has been taking his medications as prescribed. He denies chest pain, palpitations, syncope, dizziness, shortness of breath or edema. Past Medical History:   has a past medical history of Arthritis; Atrial fibrillation ([facility] [address] Cancer ([facility] [address] Cardiomyopathy [facility]); CHF (congestive heart failure) ([facility] [address] High cholesterol; Hypertension; LBBB (left bundle branch block); and MI (myocardial infarction). Surgical History:   has a past surgical history that includes hernia repair; Ankle surgery; eye surgery (2010); Cataract removal with implant (1/27/11); Cataract removal with implant (2/24/11); joint replacement (8-2008); Skin cancer excision; Cardioversion (9/25/2015); Pacemaker insertion (Left, 12/2015); and other surgical history (Bilateral, 12/16/2016). Social History:   reports that he quit smoking about 30 years ago. He has never used smokeless tobacco. He reports that he drinks alcohol. He reports that he does not use drugs.      Family History:  family history includes Diabetes in his brother and mother; Heart Failure in his father and mother. Home Medications:  Outpatient Encounter Prescriptions as of 3/23/2018   Medication Sig Dispense Refill    metoprolol succinate (TOPROL XL) 25 MG extended release tablet 1/2 tablet once daily in the morning 30 tablet 11    fludrocortisone (FLORINEF) 0.1 MG tablet Take 1 tablet by mouth daily 30 tablet 3    apixaban (ELIQUIS) 5 MG TABS tablet TAKE 1 TABLET BY MOUTH TWICE A DAY 60 tablet 11    Red Yeast Rice Extract (RED YEAST RICE PO) Take by mouth      furosemide (LASIX) 20 MG tablet Take 1 tablet by mouth daily as needed (for a 2 pound weight gain) 30 tablet 3    Aloe Vera 25 MG CAPS Take by mouth Not sure of dose      CINNAMON PO Take by mouth 2 times daily       multivitamin (THERAGRAN) per tablet Take 1 tablet by mouth daily.  Coenzyme Q10 (COQ-10 PO) Take  by mouth.  vitamin E 400 UNIT capsule Take 1 capsule by mouth daily. No facility-administered encounter medications on file as of 3/23/2018. Allergies:  Flomax [tamsulosin hcl] and Statins support therapy     Review of Systems   Constitutional: Negative. HENT: Negative. Eyes: Negative. Respiratory: Negative. Cardiovascular: Negative. Gastrointestinal: Negative. Genitourinary: Negative. Musculoskeletal: Negative. Skin: Negative. Neurological: Negative. Hematological: Negative. Psychiatric/Behavioral: Negative. BP 94/64   Pulse 70   Ht 6' 1\" (1.854 m)   Wt 190 lb (86.2 kg)   BMI 25.07 kg/m²       Objective:  Physical Exam   Constitutional: He is oriented to person, place, and time. He appears well-developed and well-nourished. HENT:   Head: Normocephalic and atraumatic. Eyes: Pupils are equal, round, and reactive to light. Neck: Normal range of motion. Cardiovascular: irregular rate, frequent PVCs and normal heart sounds. Pulmonary/Chest: Effort normal and breath sounds normal.   Abdominal: Soft. No tenderness. Musculoskeletal: Normal range of motion. He exhibits no edema. Neurological: He is alert and oriented to person, place, and time. Skin: Skin is warm and dry. Psychiatric: He has a normal mood and affect. Assessment:  1. Cardiomyopathy- non ischemic  2. SVT-slow. He has done much better clinically after the amiodarone was stopped. He has fairly frequent episodes of SVT which are more reliably recognized by his device and terminated with ATP. These are most likely PAT. He had an EP study which did not identify AV node reentry tachycardia or a concealed accessory pathway. The atrial tachycardia is too slow to be recognized by the mode switch algorithm. Ablation of the AV junction would not solve this problem very effectively as the device would still try to track the atrial tachycardia. He appears to have much more arrhythmia now, so his arrhythmia might now be inducible. 3. HTN-stable        Plan:  1. Start Diltiazem 180 mg daily for 1 month. 2. Will then interrogate device to see if this has improved the frequency of arrhythmia. 3. Stop Metoprolol as your blood pressure is on the low side. 4. If this is not effective, will plan for an EP study. 5. Follow up with me in 4-6 weeks.        Huyen Jasso M.D.

## 2018-03-23 NOTE — PATIENT INSTRUCTIONS
Plan:  1. Start Diltiazem 180 mg daily for 1 month. 2. Will then interrogate device to see if this has improved your frequency of arrhythmia. 3. Stop Metoprolol as your blood pressure is on the low side. 4. If this is not effective, will plan for an EP study.    5. Follow up with me in 4-6 weeks

## 2018-03-26 NOTE — COMMUNICATION BODY
Musculoskeletal: Normal range of motion. He exhibits no edema. Neurological: He is alert and oriented to person, place, and time. Skin: Skin is warm and dry. Psychiatric: He has a normal mood and affect. Assessment:  1. Cardiomyopathy- non ischemic  2. SVT-slow. He has done much better clinically after the amiodarone was stopped. He has fairly frequent episodes of SVT which are more reliably recognized by his device and terminated with ATP. These are most likely PAT. He had an EP study which did not identify AV node reentry tachycardia or a concealed accessory pathway. The atrial tachycardia is too slow to be recognized by the mode switch algorithm. Ablation of the AV junction would not solve this problem very effectively as the device would still try to track the atrial tachycardia. He appears to have much more arrhythmia now, so his arrhythmia might now be inducible. 3. HTN-stable        Plan:  1. Start Diltiazem 180 mg daily for 1 month. 2. Will then interrogate device to see if this has improved the frequency of arrhythmia. 3. Stop Metoprolol as your blood pressure is on the low side. 4. If this is not effective, will plan for an EP study. 5. Follow up with me in 4-6 weeks.        Zoe Love M.D.

## 2018-04-02 ENCOUNTER — TELEPHONE (OUTPATIENT)
Dept: CARDIOLOGY CLINIC | Age: 83
End: 2018-04-02

## 2018-04-06 ENCOUNTER — OFFICE VISIT (OUTPATIENT)
Dept: CARDIOLOGY CLINIC | Age: 83
End: 2018-04-06

## 2018-04-06 ENCOUNTER — PROCEDURE VISIT (OUTPATIENT)
Dept: CARDIOLOGY CLINIC | Age: 83
End: 2018-04-06

## 2018-04-06 VITALS
HEART RATE: 72 BPM | DIASTOLIC BLOOD PRESSURE: 60 MMHG | SYSTOLIC BLOOD PRESSURE: 104 MMHG | WEIGHT: 190 LBS | BODY MASS INDEX: 25.18 KG/M2 | HEIGHT: 73 IN

## 2018-04-06 DIAGNOSIS — I48.19 PERSISTENT ATRIAL FIBRILLATION (HCC): ICD-10-CM

## 2018-04-06 DIAGNOSIS — Z95.810 BIVENTRICULAR ICD (IMPLANTABLE CARDIOVERTER-DEFIBRILLATOR) IN PLACE: Primary | ICD-10-CM

## 2018-04-06 DIAGNOSIS — I42.9 CARDIOMYOPATHY, UNSPECIFIED TYPE (HCC): ICD-10-CM

## 2018-04-06 DIAGNOSIS — I50.22 CHRONIC SYSTOLIC CONGESTIVE HEART FAILURE (HCC): ICD-10-CM

## 2018-04-06 PROCEDURE — 99214 OFFICE O/P EST MOD 30 MIN: CPT | Performed by: INTERNAL MEDICINE

## 2018-04-06 PROCEDURE — 93284 PRGRMG EVAL IMPLANTABLE DFB: CPT | Performed by: INTERNAL MEDICINE

## 2018-04-06 RX ORDER — AMIODARONE HYDROCHLORIDE 200 MG/1
200 TABLET ORAL 2 TIMES DAILY
Qty: 54 TABLET | Refills: 0 | Status: SHIPPED | OUTPATIENT
Start: 2018-04-06 | End: 2018-07-10 | Stop reason: SDUPTHER

## 2018-04-12 ENCOUNTER — OFFICE VISIT (OUTPATIENT)
Dept: CARDIOLOGY CLINIC | Age: 83
End: 2018-04-12

## 2018-04-12 VITALS
BODY MASS INDEX: 25.31 KG/M2 | OXYGEN SATURATION: 97 % | HEART RATE: 63 BPM | HEIGHT: 73 IN | DIASTOLIC BLOOD PRESSURE: 70 MMHG | WEIGHT: 191 LBS | SYSTOLIC BLOOD PRESSURE: 96 MMHG

## 2018-04-12 DIAGNOSIS — Z95.810 BIVENTRICULAR ICD (IMPLANTABLE CARDIOVERTER-DEFIBRILLATOR) IN PLACE: ICD-10-CM

## 2018-04-12 DIAGNOSIS — I49.01 VENTRICULAR FIBRILLATION (HCC): ICD-10-CM

## 2018-04-12 DIAGNOSIS — I25.110 CORONARY ARTERY DISEASE INVOLVING NATIVE CORONARY ARTERY OF NATIVE HEART WITH UNSTABLE ANGINA PECTORIS (HCC): Primary | ICD-10-CM

## 2018-04-12 DIAGNOSIS — I50.22 CHRONIC SYSTOLIC CONGESTIVE HEART FAILURE (HCC): ICD-10-CM

## 2018-04-12 DIAGNOSIS — I47.1 SVT (SUPRAVENTRICULAR TACHYCARDIA) (HCC): ICD-10-CM

## 2018-04-12 DIAGNOSIS — I42.8 NON-ISCHEMIC CARDIOMYOPATHY (HCC): ICD-10-CM

## 2018-04-12 DIAGNOSIS — I49.3 PVC (PREMATURE VENTRICULAR CONTRACTION): ICD-10-CM

## 2018-04-12 DIAGNOSIS — I44.7 LBBB (LEFT BUNDLE BRANCH BLOCK): ICD-10-CM

## 2018-04-12 DIAGNOSIS — E78.00 HYPERCHOLESTEROLEMIA: ICD-10-CM

## 2018-04-12 DIAGNOSIS — I48.19 PERSISTENT ATRIAL FIBRILLATION (HCC): ICD-10-CM

## 2018-04-12 PROCEDURE — 99215 OFFICE O/P EST HI 40 MIN: CPT | Performed by: INTERNAL MEDICINE

## 2018-04-16 PROBLEM — I20.0 UNSTABLE ANGINA (HCC): Status: ACTIVE | Noted: 2018-04-16

## 2018-04-19 ENCOUNTER — TELEPHONE (OUTPATIENT)
Dept: INTERNAL MEDICINE CLINIC | Age: 83
End: 2018-04-19

## 2018-04-23 PROBLEM — R73.09 ABNORMAL GLUCOSE: Status: ACTIVE | Noted: 2018-04-23

## 2018-04-23 ASSESSMENT — ENCOUNTER SYMPTOMS
ABDOMINAL PAIN: 0
SHORTNESS OF BREATH: 0

## 2018-04-24 ENCOUNTER — NURSE ONLY (OUTPATIENT)
Dept: CARDIOLOGY CLINIC | Age: 83
End: 2018-04-24

## 2018-04-24 DIAGNOSIS — Z95.810 BIVENTRICULAR ICD (IMPLANTABLE CARDIOVERTER-DEFIBRILLATOR) IN PLACE: Primary | ICD-10-CM

## 2018-04-24 DIAGNOSIS — I50.22 CHRONIC SYSTOLIC CONGESTIVE HEART FAILURE (HCC): ICD-10-CM

## 2018-04-24 DIAGNOSIS — I42.9 CARDIOMYOPATHY (HCC): ICD-10-CM

## 2018-04-24 DIAGNOSIS — I42.9 CARDIOMYOPATHY, UNSPECIFIED TYPE (HCC): ICD-10-CM

## 2018-04-24 PROCEDURE — 93296 REM INTERROG EVL PM/IDS: CPT | Performed by: INTERNAL MEDICINE

## 2018-04-24 PROCEDURE — 93295 DEV INTERROG REMOTE 1/2/MLT: CPT | Performed by: INTERNAL MEDICINE

## 2018-04-24 PROCEDURE — 93297 REM INTERROG DEV EVAL ICPMS: CPT | Performed by: INTERNAL MEDICINE

## 2018-04-25 ENCOUNTER — OFFICE VISIT (OUTPATIENT)
Dept: INTERNAL MEDICINE CLINIC | Age: 83
End: 2018-04-25

## 2018-04-25 VITALS
WEIGHT: 192 LBS | RESPIRATION RATE: 16 BRPM | HEIGHT: 73 IN | SYSTOLIC BLOOD PRESSURE: 112 MMHG | HEART RATE: 80 BPM | BODY MASS INDEX: 25.45 KG/M2 | DIASTOLIC BLOOD PRESSURE: 70 MMHG

## 2018-04-25 DIAGNOSIS — R73.09 ABNORMAL GLUCOSE: ICD-10-CM

## 2018-04-25 DIAGNOSIS — E78.00 HYPERCHOLESTEROLEMIA: ICD-10-CM

## 2018-04-25 DIAGNOSIS — I50.22 CHRONIC SYSTOLIC CONGESTIVE HEART FAILURE (HCC): Primary | ICD-10-CM

## 2018-04-25 PROCEDURE — 99214 OFFICE O/P EST MOD 30 MIN: CPT | Performed by: INTERNAL MEDICINE

## 2018-05-07 ENCOUNTER — TELEPHONE (OUTPATIENT)
Dept: CARDIOLOGY CLINIC | Age: 83
End: 2018-05-07

## 2018-05-14 ENCOUNTER — TELEPHONE (OUTPATIENT)
Dept: CARDIOLOGY CLINIC | Age: 83
End: 2018-05-14

## 2018-05-14 ENCOUNTER — OFFICE VISIT (OUTPATIENT)
Dept: CARDIOLOGY CLINIC | Age: 83
End: 2018-05-14

## 2018-05-14 VITALS
BODY MASS INDEX: 25.5 KG/M2 | SYSTOLIC BLOOD PRESSURE: 110 MMHG | WEIGHT: 192.4 LBS | HEIGHT: 73 IN | DIASTOLIC BLOOD PRESSURE: 70 MMHG | OXYGEN SATURATION: 96 % | HEART RATE: 69 BPM

## 2018-05-14 DIAGNOSIS — E78.00 HYPERCHOLESTEROLEMIA: ICD-10-CM

## 2018-05-14 DIAGNOSIS — I49.3 PVC (PREMATURE VENTRICULAR CONTRACTION): ICD-10-CM

## 2018-05-14 DIAGNOSIS — I25.10 CORONARY ARTERY DISEASE INVOLVING NATIVE CORONARY ARTERY OF NATIVE HEART WITHOUT ANGINA PECTORIS: Primary | ICD-10-CM

## 2018-05-14 DIAGNOSIS — I47.29 NSVT (NONSUSTAINED VENTRICULAR TACHYCARDIA): ICD-10-CM

## 2018-05-14 DIAGNOSIS — I42.8 NON-ISCHEMIC CARDIOMYOPATHY (HCC): ICD-10-CM

## 2018-05-14 DIAGNOSIS — Z95.810 BIVENTRICULAR ICD (IMPLANTABLE CARDIOVERTER-DEFIBRILLATOR) IN PLACE: ICD-10-CM

## 2018-05-14 DIAGNOSIS — I44.7 LBBB (LEFT BUNDLE BRANCH BLOCK): ICD-10-CM

## 2018-05-14 DIAGNOSIS — I48.19 PERSISTENT ATRIAL FIBRILLATION (HCC): ICD-10-CM

## 2018-05-14 DIAGNOSIS — I50.22 CHRONIC SYSTOLIC CONGESTIVE HEART FAILURE (HCC): ICD-10-CM

## 2018-05-14 DIAGNOSIS — I47.1 ATRIAL TACHYCARDIA (HCC): ICD-10-CM

## 2018-05-14 PROCEDURE — 99214 OFFICE O/P EST MOD 30 MIN: CPT | Performed by: INTERNAL MEDICINE

## 2018-05-14 RX ORDER — LORATADINE 10 MG
TABLET ORAL
Qty: 90 TABLET | Refills: 3 | Status: SHIPPED | OUTPATIENT
Start: 2018-05-14 | End: 2018-08-14 | Stop reason: ALTCHOICE

## 2018-05-15 ENCOUNTER — TELEPHONE (OUTPATIENT)
Dept: CARDIOLOGY CLINIC | Age: 83
End: 2018-05-15

## 2018-05-21 RX ORDER — FLUDROCORTISONE ACETATE 0.1 MG/1
TABLET ORAL
Qty: 30 TABLET | Refills: 3 | Status: SHIPPED | OUTPATIENT
Start: 2018-05-21 | End: 2018-05-22 | Stop reason: SDUPTHER

## 2018-05-22 ENCOUNTER — PROCEDURE VISIT (OUTPATIENT)
Dept: CARDIOLOGY CLINIC | Age: 83
End: 2018-05-22

## 2018-05-22 DIAGNOSIS — Z95.810 BIVENTRICULAR ICD (IMPLANTABLE CARDIOVERTER-DEFIBRILLATOR) IN PLACE: Primary | ICD-10-CM

## 2018-05-22 RX ORDER — FLUDROCORTISONE ACETATE 0.1 MG/1
0.1 TABLET ORAL 2 TIMES DAILY
Qty: 60 TABLET | Refills: 3 | Status: SHIPPED | OUTPATIENT
Start: 2018-05-22 | End: 2018-06-21 | Stop reason: SDUPTHER

## 2018-06-04 ENCOUNTER — TELEPHONE (OUTPATIENT)
Dept: CARDIOLOGY CLINIC | Age: 83
End: 2018-06-04

## 2018-06-04 ENCOUNTER — PROCEDURE VISIT (OUTPATIENT)
Dept: CARDIOLOGY CLINIC | Age: 83
End: 2018-06-04

## 2018-06-04 ENCOUNTER — OFFICE VISIT (OUTPATIENT)
Dept: CARDIOLOGY CLINIC | Age: 83
End: 2018-06-04

## 2018-06-04 VITALS
OXYGEN SATURATION: 91 % | WEIGHT: 192 LBS | HEIGHT: 73 IN | BODY MASS INDEX: 25.45 KG/M2 | HEART RATE: 69 BPM | SYSTOLIC BLOOD PRESSURE: 100 MMHG | DIASTOLIC BLOOD PRESSURE: 60 MMHG

## 2018-06-04 DIAGNOSIS — I47.29 NSVT (NONSUSTAINED VENTRICULAR TACHYCARDIA): ICD-10-CM

## 2018-06-04 DIAGNOSIS — I47.1 ATRIAL TACHYCARDIA (HCC): ICD-10-CM

## 2018-06-04 DIAGNOSIS — I48.19 PERSISTENT ATRIAL FIBRILLATION (HCC): Primary | ICD-10-CM

## 2018-06-04 DIAGNOSIS — I42.8 NON-ISCHEMIC CARDIOMYOPATHY (HCC): ICD-10-CM

## 2018-06-04 DIAGNOSIS — I47.1 SVT (SUPRAVENTRICULAR TACHYCARDIA) (HCC): ICD-10-CM

## 2018-06-04 DIAGNOSIS — Z95.810 BIVENTRICULAR ICD (IMPLANTABLE CARDIOVERTER-DEFIBRILLATOR) IN PLACE: Primary | ICD-10-CM

## 2018-06-04 DIAGNOSIS — I50.22 CHRONIC SYSTOLIC CONGESTIVE HEART FAILURE (HCC): ICD-10-CM

## 2018-06-04 DIAGNOSIS — I25.10 CORONARY ARTERY DISEASE INVOLVING NATIVE CORONARY ARTERY OF NATIVE HEART WITHOUT ANGINA PECTORIS: ICD-10-CM

## 2018-06-04 PROCEDURE — 93290 INTERROG DEV EVAL ICPMS IP: CPT | Performed by: INTERNAL MEDICINE

## 2018-06-04 PROCEDURE — 99214 OFFICE O/P EST MOD 30 MIN: CPT | Performed by: INTERNAL MEDICINE

## 2018-06-04 PROCEDURE — 93000 ELECTROCARDIOGRAM COMPLETE: CPT | Performed by: INTERNAL MEDICINE

## 2018-06-20 ENCOUNTER — HOSPITAL ENCOUNTER (OUTPATIENT)
Dept: OTHER | Age: 83
Discharge: HOME | End: 2018-06-20
Attending: OPHTHALMOLOGY | Admitting: OPHTHALMOLOGY

## 2018-06-20 VITALS
SYSTOLIC BLOOD PRESSURE: 128 MMHG | RESPIRATION RATE: 16 BRPM | HEART RATE: 68 BPM | HEIGHT: 73 IN | WEIGHT: 192 LBS | DIASTOLIC BLOOD PRESSURE: 84 MMHG | BODY MASS INDEX: 25.45 KG/M2

## 2018-06-20 RX ORDER — APRACLONIDINE HYDROCHLORIDE 5 MG/ML
1 SOLUTION/ DROPS OPHTHALMIC 2 TIMES DAILY PRN
Status: DISCONTINUED | OUTPATIENT
Start: 2018-06-20 | End: 2018-06-21 | Stop reason: HOSPADM

## 2018-06-20 RX ORDER — PROPARACAINE HYDROCHLORIDE 5 MG/ML
1 SOLUTION/ DROPS OPHTHALMIC ONCE
Status: COMPLETED | OUTPATIENT
Start: 2018-06-20 | End: 2018-06-20

## 2018-06-20 RX ORDER — SOFT LENS RINSE,STORE SOLUTION
SOLUTION, NON-ORAL MISCELLANEOUS ONCE
Status: COMPLETED | OUTPATIENT
Start: 2018-06-20 | End: 2018-06-20

## 2018-06-20 RX ORDER — TROPICAMIDE 10 MG/ML
1 SOLUTION/ DROPS OPHTHALMIC ONCE
Status: COMPLETED | OUTPATIENT
Start: 2018-06-20 | End: 2018-06-20

## 2018-06-20 RX ORDER — PHENYLEPHRINE HCL 2.5 %
1 DROPS OPHTHALMIC (EYE) ONCE
Status: COMPLETED | OUTPATIENT
Start: 2018-06-20 | End: 2018-06-20

## 2018-06-20 RX ADMIN — Medication: at 12:54

## 2018-06-20 RX ADMIN — TROPICAMIDE 1 DROP: 10 SOLUTION/ DROPS OPHTHALMIC at 12:05

## 2018-06-20 RX ADMIN — Medication 1 DROP: at 12:08

## 2018-06-20 RX ADMIN — PROPARACAINE HYDROCHLORIDE 1 DROP: 5 SOLUTION/ DROPS OPHTHALMIC at 12:35

## 2018-06-20 RX ADMIN — APRACLONIDINE HYDROCHLORIDE 1 DROP: 5 SOLUTION/ DROPS OPHTHALMIC at 12:57

## 2018-06-20 RX ADMIN — APRACLONIDINE HYDROCHLORIDE 1 DROP: 5 SOLUTION/ DROPS OPHTHALMIC at 12:12

## 2018-06-20 ASSESSMENT — PAIN - FUNCTIONAL ASSESSMENT
PAIN_FUNCTIONAL_ASSESSMENT: 0-10
PAIN_FUNCTIONAL_ASSESSMENT: 0-10

## 2018-06-21 RX ORDER — FLUDROCORTISONE ACETATE 0.1 MG/1
0.1 TABLET ORAL 2 TIMES DAILY
Qty: 180 TABLET | Refills: 0 | Status: SHIPPED | OUTPATIENT
Start: 2018-06-21 | End: 2018-10-21 | Stop reason: SDUPTHER

## 2018-07-17 ENCOUNTER — TELEPHONE (OUTPATIENT)
Dept: CARDIOLOGY CLINIC | Age: 83
End: 2018-07-17

## 2018-07-17 DIAGNOSIS — I25.10 CORONARY ARTERY DISEASE INVOLVING NATIVE CORONARY ARTERY OF NATIVE HEART WITHOUT ANGINA PECTORIS: Primary | ICD-10-CM

## 2018-07-17 NOTE — TELEPHONE ENCOUNTER
Pt's wife called stating the patient has been having extreme fatigue, leg pain / weakness, pain under his ribs on the right side and is unstable on his feet.

## 2018-07-18 ENCOUNTER — HOSPITAL ENCOUNTER (OUTPATIENT)
Age: 83
Discharge: HOME | End: 2018-07-18
Payer: MEDICARE

## 2018-07-18 PROCEDURE — 80069 RENAL FUNCTION PANEL: CPT

## 2018-07-18 PROCEDURE — 36415 COLL VENOUS BLD VENIPUNCTURE: CPT

## 2018-07-18 PROCEDURE — 85025 COMPLETE CBC W/AUTO DIFF WBC: CPT

## 2018-07-19 ENCOUNTER — TELEPHONE (OUTPATIENT)
Dept: CARDIOLOGY CLINIC | Age: 83
End: 2018-07-19

## 2018-07-19 DIAGNOSIS — E87.6 HYPOKALEMIA: Primary | ICD-10-CM

## 2018-07-19 LAB
ALBUMIN SERPL-MCNC: 4.1 G/DL (ref 3.4–5)
ANION GAP SERPL CALCULATED.3IONS-SCNC: 13 MMOL/L (ref 3–16)
BASOPHILS ABSOLUTE: 0.1 K/UL (ref 0–0.2)
BASOPHILS RELATIVE PERCENT: 1.3 %
BUN BLDV-MCNC: 26 MG/DL (ref 7–20)
CALCIUM SERPL-MCNC: 8.5 MG/DL (ref 8.3–10.6)
CHLORIDE BLD-SCNC: 97 MMOL/L (ref 99–110)
CO2: 30 MMOL/L (ref 21–32)
CREAT SERPL-MCNC: 1.2 MG/DL (ref 0.8–1.3)
EOSINOPHILS ABSOLUTE: 0.1 K/UL (ref 0–0.6)
EOSINOPHILS RELATIVE PERCENT: 2.6 %
GFR AFRICAN AMERICAN: >60
GFR NON-AFRICAN AMERICAN: 57
GLUCOSE BLD-MCNC: 98 MG/DL (ref 70–99)
HCT VFR BLD CALC: 41.2 % (ref 40.5–52.5)
HEMOGLOBIN: 14 G/DL (ref 13.5–17.5)
LYMPHOCYTES ABSOLUTE: 1.2 K/UL (ref 1–5.1)
LYMPHOCYTES RELATIVE PERCENT: 21.8 %
MCH RBC QN AUTO: 33 PG (ref 26–34)
MCHC RBC AUTO-ENTMCNC: 34 G/DL (ref 31–36)
MCV RBC AUTO: 97.1 FL (ref 80–100)
MONOCYTES ABSOLUTE: 0.5 K/UL (ref 0–1.3)
MONOCYTES RELATIVE PERCENT: 9.6 %
NEUTROPHILS ABSOLUTE: 3.4 K/UL (ref 1.7–7.7)
NEUTROPHILS RELATIVE PERCENT: 64.7 %
PDW BLD-RTO: 14.5 % (ref 12.4–15.4)
PHOSPHORUS: 3.7 MG/DL (ref 2.5–4.9)
PLATELET # BLD: 167 K/UL (ref 135–450)
PMV BLD AUTO: 9.1 FL (ref 5–10.5)
POTASSIUM SERPL-SCNC: 2.5 MMOL/L (ref 3.5–5.1)
RBC # BLD: 4.25 M/UL (ref 4.2–5.9)
SODIUM BLD-SCNC: 140 MMOL/L (ref 136–145)
WBC # BLD: 5.3 K/UL (ref 4–11)

## 2018-07-19 RX ORDER — POTASSIUM CHLORIDE 750 MG/1
TABLET, EXTENDED RELEASE ORAL
Qty: 60 TABLET | Refills: 3 | Status: SHIPPED | OUTPATIENT
Start: 2018-07-19 | End: 2018-11-04 | Stop reason: SDUPTHER

## 2018-07-20 ENCOUNTER — TELEPHONE (OUTPATIENT)
Dept: CARDIOLOGY CLINIC | Age: 83
End: 2018-07-20

## 2018-07-20 DIAGNOSIS — I48.19 PERSISTENT ATRIAL FIBRILLATION (HCC): Primary | ICD-10-CM

## 2018-07-20 NOTE — TELEPHONE ENCOUNTER
Pt's wife called stating the patient has been having extreme fatigue, leg pain / weakness, pain under his ribs on the right side and is unstable on his feet. Patient thought this was due to his Amiodarone so he stopped taking it 3 days ago. JJP ordered lab work and patient was found to have critical low potassium, 2.5, and was started on Potassium supplements. He remains off Amiodarone however. Last TSH I see in chart is 3/28/17 1.65. Do you want him to restart Amiodarone? Do you want him to obtain TSH blood work?

## 2018-07-23 NOTE — TELEPHONE ENCOUNTER
You originally wanted him to take 200 mg for 14 days then 100 mg daily. What does should he be taking now? He stopped taking the medication on the 7/17/18 but resumed last night at the 200 mg dosage.

## 2018-07-26 ENCOUNTER — HOSPITAL ENCOUNTER (OUTPATIENT)
Age: 83
Discharge: HOME | End: 2018-07-26
Payer: MEDICARE

## 2018-07-26 DIAGNOSIS — I48.19 PERSISTENT ATRIAL FIBRILLATION (HCC): ICD-10-CM

## 2018-07-26 DIAGNOSIS — E87.6 HYPOKALEMIA: ICD-10-CM

## 2018-07-26 LAB
POTASSIUM SERPL-SCNC: 3.1 MMOL/L (ref 3.5–5.1)
TSH REFLEX: 2.83 UIU/ML (ref 0.27–4.2)

## 2018-07-26 PROCEDURE — 84132 ASSAY OF SERUM POTASSIUM: CPT

## 2018-07-26 PROCEDURE — 84443 ASSAY THYROID STIM HORMONE: CPT

## 2018-07-26 PROCEDURE — 36415 COLL VENOUS BLD VENIPUNCTURE: CPT

## 2018-07-31 ENCOUNTER — TELEPHONE (OUTPATIENT)
Dept: CARDIOLOGY CLINIC | Age: 83
End: 2018-07-31

## 2018-07-31 NOTE — TELEPHONE ENCOUNTER
Potassium   Order: [id number]   Status:  Final result   Visible to patient:  Yes (MyChart)   Notes recorded by Yinka Saavedra MD on 7/30/2018 at 6:27 PM EDT  K still low. Already taking 2 a day, increase to 4table daily for 4 days then resume 2tabs daily     Notified patient's wife of instructions and she voiced understanding.

## 2018-08-01 DIAGNOSIS — E87.6 LOW BLOOD POTASSIUM: ICD-10-CM

## 2018-08-06 ENCOUNTER — TELEPHONE (OUTPATIENT)
Dept: CARDIOLOGY CLINIC | Age: 83
End: 2018-08-06

## 2018-08-06 ENCOUNTER — HOSPITAL ENCOUNTER (OUTPATIENT)
Age: 83
Discharge: HOME | End: 2018-08-06
Payer: MEDICARE

## 2018-08-06 DIAGNOSIS — E87.6 LOW BLOOD POTASSIUM: ICD-10-CM

## 2018-08-06 LAB — POTASSIUM SERPL-SCNC: 3.6 MMOL/L (ref 3.5–5.1)

## 2018-08-06 PROCEDURE — 84132 ASSAY OF SERUM POTASSIUM: CPT

## 2018-08-06 PROCEDURE — 36415 COLL VENOUS BLD VENIPUNCTURE: CPT

## 2018-08-14 ENCOUNTER — PROCEDURE VISIT (OUTPATIENT)
Dept: CARDIOLOGY CLINIC | Age: 83
End: 2018-08-14

## 2018-08-14 ENCOUNTER — OFFICE VISIT (OUTPATIENT)
Dept: CARDIOLOGY CLINIC | Age: 83
End: 2018-08-14

## 2018-08-14 VITALS
OXYGEN SATURATION: 95 % | SYSTOLIC BLOOD PRESSURE: 104 MMHG | BODY MASS INDEX: 25.31 KG/M2 | HEIGHT: 73 IN | DIASTOLIC BLOOD PRESSURE: 60 MMHG | HEART RATE: 71 BPM | WEIGHT: 191 LBS

## 2018-08-14 DIAGNOSIS — I48.19 PERSISTENT ATRIAL FIBRILLATION (HCC): Primary | ICD-10-CM

## 2018-08-14 DIAGNOSIS — I47.29 NSVT (NONSUSTAINED VENTRICULAR TACHYCARDIA): ICD-10-CM

## 2018-08-14 DIAGNOSIS — I25.10 CORONARY ARTERY DISEASE INVOLVING NATIVE CORONARY ARTERY OF NATIVE HEART WITHOUT ANGINA PECTORIS: ICD-10-CM

## 2018-08-14 DIAGNOSIS — I42.8 NON-ISCHEMIC CARDIOMYOPATHY (HCC): ICD-10-CM

## 2018-08-14 DIAGNOSIS — I44.7 LBBB (LEFT BUNDLE BRANCH BLOCK): ICD-10-CM

## 2018-08-14 DIAGNOSIS — I10 ESSENTIAL HYPERTENSION: ICD-10-CM

## 2018-08-14 DIAGNOSIS — Z95.810 BIVENTRICULAR ICD (IMPLANTABLE CARDIOVERTER-DEFIBRILLATOR) IN PLACE: ICD-10-CM

## 2018-08-14 DIAGNOSIS — Z95.810 BIVENTRICULAR ICD (IMPLANTABLE CARDIOVERTER-DEFIBRILLATOR) IN PLACE: Primary | ICD-10-CM

## 2018-08-14 DIAGNOSIS — I50.22 CHRONIC SYSTOLIC CONGESTIVE HEART FAILURE (HCC): ICD-10-CM

## 2018-08-14 DIAGNOSIS — E78.2 MIXED HYPERLIPIDEMIA: ICD-10-CM

## 2018-08-14 DIAGNOSIS — E87.6 HYPOKALEMIA: ICD-10-CM

## 2018-08-14 PROCEDURE — 99214 OFFICE O/P EST MOD 30 MIN: CPT | Performed by: NURSE PRACTITIONER

## 2018-08-14 RX ORDER — AMIODARONE HYDROCHLORIDE 200 MG/1
100 TABLET ORAL DAILY
Qty: 30 TABLET | Refills: 3
Start: 2018-08-14 | End: 2019-01-01

## 2018-08-14 NOTE — PATIENT INSTRUCTIONS
1. Continue the Plavix 75 mg once daily  2. Continue the Eliquis 5 mg twice daily  3. No change in the amiodarone or other heart medicines  4. Have blood work to check potassium, magnesium and kidney's within next week  5. Follow up with Dr. Conley in September as scheduled  6.  Follow up with me in 3 months

## 2018-08-14 NOTE — PROGRESS NOTES
[address]   Cardiac Evaluation    Primary Care Doctor:  Yaniv Simmons MD    Chief Complaint   Patient presents with    Coronary Artery Disease     device chk    Congestive Heart Failure    Atrial Fibrillation    Hypertension    Hyperlipidemia        History of Present Illness:   I had the pleasure of seeing Jose Antonio Dasilva in follow up for atrial fibrillation, SVT, NICM, CHF, and HTN. He had a BiV ICD implanted in 11/2015 and had an SVT ablation in 11/2015. He had an abnormal stress test in 7/2016 and had POBA of the RCA. His defib delivered two shocks over a 2-wk period in April 2018. Amiodorone was restarted. His dizziness worsened with this. He underwent cardiac cath and PCI of distal circumflex extending into left PL branch with Synergy drug-eluting stent. His dizziness did not improve after the PCI. He was treated for severe hypokalemia causing fatigue which gradually improved. Patient in the interim held the amiodarone as felt this was causing his weakness. He reports his leg weakness and tingling in feet is improved. He also reports his lightheadedness is much better. He admits to dizziness today, question if related to NSVT around 12 noon, occurred with showering/ shaving. Device check reviewed, continues to have atrial and ventricular arrhythmia's - to be reviewed by Dr. Ryan Pennington. Lawrence Solis shows stable thoracic impedence consistent with stable fluid index. Jose Antonio Dasilva describes symptoms including fatigue, lightheadedness but denies chest pain, dyspnea, palpitations, orthopnea, PND, early saiety, edema, syncope. NYHA:   II  ACC/ AHA Stage:    C    Past Medical History:   has a past medical history of Arthritis; Atrial fibrillation ([address] Cancer ([address] Cardiomyopathy [facility]); CHF (congestive heart failure) ([address] High cholesterol; Hypertension; LBBB (left bundle branch block); and MI (myocardial infarction) ([address]   Surgical History:   has a past surgical history that includes hernia repair; Ankle surgery; eye surgery (2010); Cataract removal with implant (1/27/11); Cataract removal with implant (2/24/11); joint replacement (8-2008); Skin cancer excision; Cardioversion (9/25/2015); Pacemaker insertion (Left, 12/2015); and other surgical history (Bilateral, 12/16/2016). Social History:   reports that he quit smoking about 30 years ago. He has never used smokeless tobacco. He reports that he drinks alcohol. He reports that he does not use drugs. Family History:   Family History   Problem Relation Age of Onset    Diabetes Mother     Heart Failure Mother         CHF    Heart Failure Father         CHF    Diabetes Brother        Home Medications:  Prior to Admission medications    Medication Sig Start Date End Date Taking? Authorizing Provider   potassium chloride (KLOR-CON M) 10 MEQ extended release tablet Take 4 tablets every day for one week. Then take 2 tablets every day after that  Patient taking differently: take 2 tablets every day 7/19/18  Yes Balbir Kumar MD   amiodarone (CORDARONE) 200 MG tablet Take 1 tablet by mouth daily For 14 days then 1 tablet daily  Patient taking differently: Take 100 mg by mouth daily For 14 days then 1 tablet daily 7/10/18  Yes Angelina Street MD   fludrocortisone (FLORINEF) 0.1 MG tablet Take 1 tablet by mouth 2 times daily 6/21/18  Yes Balbir Kumar MD   clopidogrel (PLAVIX) 75 MG tablet Take 1 tablet by mouth daily 4/17/18  Yes COLIN Gamez - CNP   apixaban (ELIQUIS) 5 MG TABS tablet TAKE 1 TABLET BY MOUTH TWICE A DAY  Patient taking differently: 5 mg daily TAKE 1 TABLET BY MOUTH TWICE A DAY 3/21/17  Yes Cruz Jonass, APRN - CNP   Red Yeast Rice Extract (RED YEAST RICE PO) Take by mouth   Yes Historical Provider, MD   CINNAMON PO Take by mouth 2 times daily    Yes Historical Provider, MD   multivitamin [facility]) per tablet Take 1 tablet by mouth daily.      Yes Historical Provider, MD   Coenzyme Q10 (COQ-10 no apparent distress, normal coloration  HEENT:  Normocephalic, atraumatic  Respiratory:  · Normal excursion and expansion without use of accessory muscles  · Resp Auscultation: Normal breath sounds without dullness  Cardiovascular:  · The apical impulses not displaced  · Heart tones are crisp and normal  · JVP 8 cm H2O  · Regular rate and rhythm, normal A4H9, soft systolic murmur, no g/r  · Peripheral pulses are symmetrical and full  · There is no clubbing, cyanosis of the extremities.   · Trace BLE edema  · Pedal Pulses: 2+ and equal   Abdomen:  · No masses or tenderness  · Liver/Spleen: No Abnormalities Noted  Neurological/Psychiatric:  · Alert and oriented in all spheres  · Moves all extremities well  · Exhibits normal gait balance and coordination  · No abnormalities of mood, affect, memory, mentation, or behavior are noted    Lab Data:    CBC:   Lab Results   Component Value Date    WBC 5.3 07/18/2018    WBC 5.7 04/16/2018    WBC 8.0 03/28/2017    RBC 4.25 07/18/2018    RBC 4.65 04/16/2018    RBC 4.29 03/28/2017    HGB 14.0 07/18/2018    HGB 14.5 04/16/2018    HGB 13.9 03/28/2017    HCT 41.2 07/18/2018    HCT 43.2 04/16/2018    HCT 41.0 03/28/2017    MCV 97.1 07/18/2018    MCV 92.7 04/16/2018    MCV 95.4 03/28/2017    RDW 14.5 07/18/2018    RDW 14.8 04/16/2018    RDW 14.5 03/28/2017     07/18/2018     04/16/2018     03/28/2017     BMP:  Lab Results   Component Value Date     07/18/2018     04/17/2018     04/16/2018    K 3.6 08/06/2018    K 3.1 07/26/2018    K 2.5 07/18/2018    K 3.6 04/17/2018    CL 97 07/18/2018     04/17/2018     04/16/2018    CO2 30 07/18/2018    CO2 28 04/17/2018    CO2 27 04/16/2018    PHOS 3.7 07/18/2018    PHOS 3.5 08/12/2015    BUN 26 07/18/2018    BUN 15 04/17/2018    BUN 25 04/16/2018    CREATININE 1.2 07/18/2018    CREATININE 0.8 04/17/2018    CREATININE 1.1 04/16/2018     BNP:   Lab Results   Component Value Date    PROBNP 9,246 03/27/2017    PROBNP 3,784 08/21/2015    PROBNP 5,149 08/11/2015        Cardiac Imaging:  Cardiac cath 4/16/18:   INDICATION:  1. Ventricular fibrillation. 2.  Unstable angina. 3.  Coronary artery disease. PROCEDURE PERFORMED:  1.  Bilateral coronary angiography. 2.  Left heart catheterization. 3.  FFR of mid RCA. 4.  PCI of distal circumflex extending into left PL branch with Synergy  drug-eluting stent. 5.  Moderate sedation. 6.  Ultrasound-guided right common femoral arterial access. FINDINGS:  1. Hemodynamics:  AO is 145/91, mean of 110, , LVEDP was 20 mmHg. There was no gradient across the aortic valve. 2.  LV gram was not done due to preserve the dye as the patient has severe  left ventricular systolic function. Previous EF was 50% in 03/2017. CORONARY ANGIOGRAPHY:  1. Left main was a large-caliber vessel that trifurcated. Mid to distal  left main had 30% diffuse stenosis. 2.  Left circumflex was a large-caliber vessel. There was no ____ noted to  come off the left circumflex. In the posterior AV groove, it gave off a  large PL branch that had 80% proximal stenosis, which was reduced to 0%  post stent placing. The circumflex had diffuse stenosis of 40% in the mid  segment. 3.  Ramus was a large-caliber vessel that bifurcated. Proximal ramus had  20% diffuse stenosis. 4. LAD was a large caliber vessel that reached the apex. Proximal LAD had  50% calcific stenosis. The LAD gave off three diagonal branches. The  first was small. The second was a medium-caliber that had 90% ostial  stenosis. The third was very small. 5.  Right coronary artery was a large and dominant and had a diffuse  stenosis of 50% proximal and mid RCA. However, FFR across the segment was  insignificant. The RCA gave off medium-caliber right posterior descending  artery, which was free of significant disease. IMPRESSION:  1.   Successful PCI of high-grade proximal left PL branch with mildly dilated. The aortic root is mildly dilated. Aortic valve appears tricuspid and mildly sclerotic but opens adequately. There is trivial to mild tricuspid regurgitation. Systolic pulmonary artery pressure (SPAP) is normal and estimated at 38 mmHg (RA pressure 15 mmHg). Mild-to-moderate pulmonic regurgitation is present. Assessment:    1. Persistent atrial fibrillation ([address]    2. Coronary artery disease involving native coronary artery of native heart without angina pectoris    3. Non-ischemic cardiomyopathy ([address]    4. NSVT (nonsustained ventricular tachycardia) ([address]    5. Biventricular ICD (implantable cardioverter-defibrillator) in place    6. Chronic systolic congestive heart failure ([address]    7. LBBB (left bundle branch block)    8. Mixed hyperlipidemia    9. Essential hypertension          Plan:   1. Continue the Plavix 75 mg once daily  2. Continue the Eliquis 5 mg twice daily (had been only taking it once daily)  3. No change in the amiodarone or other heart medicines  4. Have blood work to check potassium, magnesium and kidney's within next week  5. Follow up with Dr. Jane Batista in September as scheduled  6. Follow up with me in 3 months      I appreciate the opportunity of cooperating in the care of this individual.    Himanshu Root CNP, 8/14/2018, 3:05 PM    QUALITY MEASURES  1. Tobacco Cessation Counseling: NA  2. Retake of BP if >140/90:   NA  3. Documentation to PCP/referring for new patient:  Sent to PCP at close of office visit  4. CAD patient on anti-platelet: Yes  5. CAD patient on STATIN therapy:  No (intolerant)  6.  Patient with CHF and aFib on anticoagulation:  Yes

## 2018-08-21 ENCOUNTER — TELEPHONE (OUTPATIENT)
Dept: CARDIOLOGY CLINIC | Age: 83
End: 2018-08-21

## 2018-08-21 ENCOUNTER — HOSPITAL ENCOUNTER (OUTPATIENT)
Age: 83
Discharge: HOME | End: 2018-08-21
Payer: MEDICARE

## 2018-08-21 DIAGNOSIS — Z79.899 MEDICATION MANAGEMENT: Primary | ICD-10-CM

## 2018-08-21 LAB
ANION GAP SERPL CALCULATED.3IONS-SCNC: 14 MMOL/L (ref 3–16)
BUN BLDV-MCNC: 25 MG/DL (ref 7–20)
CALCIUM SERPL-MCNC: 8.8 MG/DL (ref 8.3–10.6)
CHLORIDE BLD-SCNC: 103 MMOL/L (ref 99–110)
CO2: 27 MMOL/L (ref 21–32)
CREAT SERPL-MCNC: 1.2 MG/DL (ref 0.8–1.3)
GFR AFRICAN AMERICAN: >60
GFR NON-AFRICAN AMERICAN: 57
GLUCOSE BLD-MCNC: 89 MG/DL (ref 70–99)
MAGNESIUM: 2.2 MG/DL (ref 1.8–2.4)
POTASSIUM SERPL-SCNC: 3.2 MMOL/L (ref 3.5–5.1)
SODIUM BLD-SCNC: 144 MMOL/L (ref 136–145)

## 2018-08-21 PROCEDURE — 80048 BASIC METABOLIC PNL TOTAL CA: CPT

## 2018-08-21 PROCEDURE — 83735 ASSAY OF MAGNESIUM: CPT

## 2018-08-21 PROCEDURE — 36415 COLL VENOUS BLD VENIPUNCTURE: CPT

## 2018-08-21 RX ORDER — SPIRONOLACTONE 25 MG/1
TABLET ORAL
Qty: 30 TABLET | Refills: 3 | Status: SHIPPED | OUTPATIENT
Start: 2018-08-21 | End: 2018-10-16

## 2018-08-21 NOTE — TELEPHONE ENCOUNTER
----- Message from COLIN Nevarez CNP sent at 8/21/2018  3:48 PM EDT -----  Magnesium level is normal.  Potassium is low. Kidney function panel okay. He is currently taking Klor Con 10 mEq, 2 daily. Let's increase this back to 4 total daily for 1 week then decrease back to 2 daily. Let's also start spironolactone 25 mg, half tablet daily just for potassium sparing effects. Repeat BMP in 1 week.    Thanks, [employer]

## 2018-08-21 NOTE — TELEPHONE ENCOUNTER
Spoke with Siri Gloria, pt wife, who is on HIPAA form relayed message per [employer] . Siri Gloria verbalized understanding.

## 2018-08-30 RX ORDER — CLOPIDOGREL BISULFATE 75 MG/1
75 TABLET ORAL DAILY
Qty: 90 TABLET | Refills: 3 | Status: SHIPPED | OUTPATIENT
Start: 2018-08-30 | End: 2019-01-01

## 2018-09-04 ENCOUNTER — NURSE ONLY (OUTPATIENT)
Dept: CARDIOLOGY CLINIC | Age: 83
End: 2018-09-04

## 2018-09-04 DIAGNOSIS — Z95.810 BIVENTRICULAR ICD (IMPLANTABLE CARDIOVERTER-DEFIBRILLATOR) IN PLACE: ICD-10-CM

## 2018-09-04 DIAGNOSIS — I42.8 NON-ISCHEMIC CARDIOMYOPATHY (HCC): ICD-10-CM

## 2018-09-04 DIAGNOSIS — I42.9 CARDIOMYOPATHY (HCC): ICD-10-CM

## 2018-09-04 PROCEDURE — 93296 REM INTERROG EVL PM/IDS: CPT | Performed by: INTERNAL MEDICINE

## 2018-09-04 PROCEDURE — 93295 DEV INTERROG REMOTE 1/2/MLT: CPT | Performed by: INTERNAL MEDICINE

## 2018-09-04 PROCEDURE — 93297 REM INTERROG DEV EVAL ICPMS: CPT | Performed by: INTERNAL MEDICINE

## 2018-09-05 NOTE — PROGRESS NOTES
Carelink transmission shows normal sensing and pacing function. Multiple recordings of SVT/VT. ATP is successful. OV in couple weeks to see VALERIAB. See interrogation for more details. Optivol is within normal range.

## 2018-09-24 ENCOUNTER — OFFICE VISIT (OUTPATIENT)
Dept: CARDIOLOGY CLINIC | Age: 83
End: 2018-09-24
Payer: MEDICARE

## 2018-09-24 ENCOUNTER — PROCEDURE VISIT (OUTPATIENT)
Dept: CARDIOLOGY CLINIC | Age: 83
End: 2018-09-24
Payer: MEDICARE

## 2018-09-24 VITALS
HEIGHT: 73 IN | HEART RATE: 72 BPM | SYSTOLIC BLOOD PRESSURE: 122 MMHG | DIASTOLIC BLOOD PRESSURE: 88 MMHG | BODY MASS INDEX: 25.45 KG/M2 | WEIGHT: 192 LBS

## 2018-09-24 DIAGNOSIS — I47.1 SVT (SUPRAVENTRICULAR TACHYCARDIA) (HCC): ICD-10-CM

## 2018-09-24 DIAGNOSIS — I25.10 CORONARY ARTERY DISEASE INVOLVING NATIVE CORONARY ARTERY OF NATIVE HEART WITHOUT ANGINA PECTORIS: ICD-10-CM

## 2018-09-24 DIAGNOSIS — Z95.810 BIVENTRICULAR ICD (IMPLANTABLE CARDIOVERTER-DEFIBRILLATOR) IN PLACE: ICD-10-CM

## 2018-09-24 DIAGNOSIS — I48.19 PERSISTENT ATRIAL FIBRILLATION (HCC): Primary | ICD-10-CM

## 2018-09-24 DIAGNOSIS — I42.8 NON-ISCHEMIC CARDIOMYOPATHY (HCC): ICD-10-CM

## 2018-09-24 DIAGNOSIS — I47.29 NSVT (NONSUSTAINED VENTRICULAR TACHYCARDIA): ICD-10-CM

## 2018-09-24 PROCEDURE — 99214 OFFICE O/P EST MOD 30 MIN: CPT | Performed by: INTERNAL MEDICINE

## 2018-09-24 PROCEDURE — 93284 PRGRMG EVAL IMPLANTABLE DFB: CPT | Performed by: INTERNAL MEDICINE

## 2018-09-24 NOTE — PROGRESS NOTES
Interrogation and programming evaluation of the device shows normal function, with stable sensing and pacing thresholds. See interrogation for details. The pt will see Dr Wing Castro today. Recheck remotely 3 mos.

## 2018-09-24 NOTE — PROGRESS NOTES
[facility]   Cardiac follow up       HPI:  Rosemary Crowell is a 80 y.o. male who presents for follow up of non ischemic cardiomyopathy and arrhythmia. His wife states he has had an irregular rhythm for years. However the EKGs available showed normal rhythm until 08/11/2015 which showed AF. Echo from 08/11/15 showed an EF of 30-35%. Eliquis was started on 08/12/15. He underwent successful cardioversion on 09/25/15. Amiodarone and coreg were stopped on 10/16/15 due to sinus bradycardia with a HR of 48   He underwent placement of BiV ICD 11/3/15 for primary SCA protection in the presence of NICM and class IIb CHF. On 12/18/15, device interrogation shows normal function, but multiple episodes of tachycardia which appear to be SVT. These occur at about 125 bpm and are associated with LH. 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 His 24 hr Holter monitor from 3/24/16 showed 41% PVC's. 60 second EKG strip in office shows very frequent PVC's of 3 distinct morphologies. He had an abnormal stress test on 07/08/16. He underwent a cardiac cath on 07/26/16 which resulted in a POBA of mid RCA. A stent could not be navigated to that area. He was started on Plavix  He was in the hospital on 3/28/17 for several days of SOB. Device check at that time showed 9 days of A fib. He underwent a cardioversion on 3/28/17. His device check today shows he has had some episodes of slow VT occurring in the evening. His device check, 3/23/2018, showed that he received an inappropriate shock on 3/9/2018 for AT. It also showed numerous episodes of tachycardia which were characterized as VT. He reported he has been taking his medications as prescribed. He denied chest pain, palpitations, syncope, dizziness, shortness of breath or edema.

## 2018-09-27 ENCOUNTER — TELEPHONE (OUTPATIENT)
Dept: CARDIOLOGY CLINIC | Age: 83
End: 2018-09-27

## 2018-10-02 ENCOUNTER — HOSPITAL ENCOUNTER (OUTPATIENT)
Age: 83
Discharge: HOME | End: 2018-10-02
Payer: MEDICARE

## 2018-10-02 LAB
A/G RATIO: 1.6 (ref 1.1–2.2)
ALBUMIN SERPL-MCNC: 4.4 G/DL (ref 3.4–5)
ALP BLD-CCNC: 75 U/L (ref 40–129)
ALT SERPL-CCNC: 9 U/L (ref 10–40)
ANION GAP SERPL CALCULATED.3IONS-SCNC: 15 MMOL/L (ref 3–16)
AST SERPL-CCNC: 19 U/L (ref 15–37)
BASOPHILS ABSOLUTE: 0.1 K/UL (ref 0–0.2)
BASOPHILS RELATIVE PERCENT: 1.3 %
BILIRUB SERPL-MCNC: 0.8 MG/DL (ref 0–1)
BILIRUBIN URINE: NEGATIVE
BLOOD, URINE: NEGATIVE
BUN BLDV-MCNC: 22 MG/DL (ref 7–20)
CALCIUM SERPL-MCNC: 8.9 MG/DL (ref 8.3–10.6)
CHLORIDE BLD-SCNC: 104 MMOL/L (ref 99–110)
CHOLESTEROL, FASTING: 233 MG/DL (ref 0–199)
CLARITY: CLEAR
CO2: 26 MMOL/L (ref 21–32)
COLOR: YELLOW
CREAT SERPL-MCNC: 1.1 MG/DL (ref 0.8–1.3)
EOSINOPHILS ABSOLUTE: 0.1 K/UL (ref 0–0.6)
EOSINOPHILS RELATIVE PERCENT: 1.6 %
GFR AFRICAN AMERICAN: >60
GFR NON-AFRICAN AMERICAN: >60
GLOBULIN: 2.8 G/DL
GLUCOSE FASTING: 96 MG/DL (ref 70–99)
GLUCOSE URINE: NEGATIVE MG/DL
HCT VFR BLD CALC: 41.3 % (ref 40.5–52.5)
HDLC SERPL-MCNC: 54 MG/DL (ref 40–60)
HEMOGLOBIN: 14.2 G/DL (ref 13.5–17.5)
KETONES, URINE: NEGATIVE MG/DL
LDL CHOLESTEROL CALCULATED: 163 MG/DL
LEUKOCYTE ESTERASE, URINE: NEGATIVE
LYMPHOCYTES ABSOLUTE: 0.8 K/UL (ref 1–5.1)
LYMPHOCYTES RELATIVE PERCENT: 15.5 %
MCH RBC QN AUTO: 33.2 PG (ref 26–34)
MCHC RBC AUTO-ENTMCNC: 34.3 G/DL (ref 31–36)
MCV RBC AUTO: 96.6 FL (ref 80–100)
MICROSCOPIC EXAMINATION: NORMAL
MONOCYTES ABSOLUTE: 0.5 K/UL (ref 0–1.3)
MONOCYTES RELATIVE PERCENT: 10 %
NEUTROPHILS ABSOLUTE: 3.9 K/UL (ref 1.7–7.7)
NEUTROPHILS RELATIVE PERCENT: 71.6 %
NITRITE, URINE: NEGATIVE
PDW BLD-RTO: 14.9 % (ref 12.4–15.4)
PH UA: 7
PLATELET # BLD: 193 K/UL (ref 135–450)
PMV BLD AUTO: 8.6 FL (ref 5–10.5)
POTASSIUM SERPL-SCNC: 3.2 MMOL/L (ref 3.5–5.1)
PROTEIN UA: NEGATIVE MG/DL
RBC # BLD: 4.28 M/UL (ref 4.2–5.9)
SODIUM BLD-SCNC: 145 MMOL/L (ref 136–145)
SPECIFIC GRAVITY UA: 1.01
TOTAL PROTEIN: 7.2 G/DL (ref 6.4–8.2)
TRIGLYCERIDE, FASTING: 78 MG/DL (ref 0–150)
TSH SERPL DL<=0.05 MIU/L-ACNC: 3.35 UIU/ML (ref 0.27–4.2)
URINE TYPE: NORMAL
UROBILINOGEN, URINE: 0.2 E.U./DL
VLDLC SERPL CALC-MCNC: 16 MG/DL
WBC # BLD: 5.4 K/UL (ref 4–11)

## 2018-10-02 PROCEDURE — 80053 COMPREHEN METABOLIC PANEL: CPT

## 2018-10-02 PROCEDURE — 87077 CULTURE AEROBIC IDENTIFY: CPT

## 2018-10-02 PROCEDURE — 87086 URINE CULTURE/COLONY COUNT: CPT

## 2018-10-02 PROCEDURE — 85025 COMPLETE CBC W/AUTO DIFF WBC: CPT

## 2018-10-02 PROCEDURE — 36415 COLL VENOUS BLD VENIPUNCTURE: CPT

## 2018-10-02 PROCEDURE — 81003 URINALYSIS AUTO W/O SCOPE: CPT

## 2018-10-02 PROCEDURE — 87186 SC STD MICRODIL/AGAR DIL: CPT

## 2018-10-02 PROCEDURE — 80061 LIPID PANEL: CPT

## 2018-10-02 PROCEDURE — 84443 ASSAY THYROID STIM HORMONE: CPT

## 2018-10-04 LAB
ORGANISM: ABNORMAL
URINE CULTURE, ROUTINE: ABNORMAL
URINE CULTURE, ROUTINE: ABNORMAL

## 2018-10-15 ENCOUNTER — TELEPHONE (OUTPATIENT)
Dept: CARDIOLOGY CLINIC | Age: 83
End: 2018-10-15

## 2018-10-18 ENCOUNTER — HOSPITAL ENCOUNTER (OUTPATIENT)
Dept: CARDIAC CATH/INVASIVE PROCEDURES | Age: 83
Discharge: HOME | End: 2018-10-19
Attending: INTERNAL MEDICINE | Admitting: INTERNAL MEDICINE
Payer: MEDICARE

## 2018-10-18 LAB
ANION GAP SERPL CALCULATED.3IONS-SCNC: 13 MMOL/L (ref 3–16)
BASOPHILS ABSOLUTE: 0 K/UL (ref 0–0.2)
BASOPHILS RELATIVE PERCENT: 0.9 %
BUN BLDV-MCNC: 29 MG/DL (ref 7–20)
CALCIUM SERPL-MCNC: 9 MG/DL (ref 8.3–10.6)
CHLORIDE BLD-SCNC: 103 MMOL/L (ref 99–110)
CO2: 27 MMOL/L (ref 21–32)
CREAT SERPL-MCNC: 1 MG/DL (ref 0.8–1.3)
EOSINOPHILS ABSOLUTE: 0.1 K/UL (ref 0–0.6)
EOSINOPHILS RELATIVE PERCENT: 1.1 %
GFR AFRICAN AMERICAN: >60
GFR NON-AFRICAN AMERICAN: >60
GLUCOSE BLD-MCNC: 102 MG/DL (ref 70–99)
HCT VFR BLD CALC: 42.6 % (ref 40.5–52.5)
HEMOGLOBIN: 14.6 G/DL (ref 13.5–17.5)
INR BLD: 1.17 (ref 0.86–1.14)
LYMPHOCYTES ABSOLUTE: 1 K/UL (ref 1–5.1)
LYMPHOCYTES RELATIVE PERCENT: 19 %
MCH RBC QN AUTO: 32.9 PG (ref 26–34)
MCHC RBC AUTO-ENTMCNC: 34.3 G/DL (ref 31–36)
MCV RBC AUTO: 96 FL (ref 80–100)
MONOCYTES ABSOLUTE: 0.5 K/UL (ref 0–1.3)
MONOCYTES RELATIVE PERCENT: 9.8 %
NEUTROPHILS ABSOLUTE: 3.7 K/UL (ref 1.7–7.7)
NEUTROPHILS RELATIVE PERCENT: 69.2 %
PDW BLD-RTO: 14.9 % (ref 12.4–15.4)
PLATELET # BLD: 165 K/UL (ref 135–450)
PMV BLD AUTO: 8.1 FL (ref 5–10.5)
POTASSIUM SERPL-SCNC: 3.9 MMOL/L (ref 3.5–5.1)
PROTHROMBIN TIME: 13.3 SEC (ref 9.8–13)
RBC # BLD: 4.44 M/UL (ref 4.2–5.9)
SODIUM BLD-SCNC: 143 MMOL/L (ref 136–145)
WBC # BLD: 5.4 K/UL (ref 4–11)

## 2018-10-18 PROCEDURE — 6360000002 HC RX W HCPCS

## 2018-10-18 PROCEDURE — 93613 INTRACARDIAC EPHYS 3D MAPG: CPT | Performed by: INTERNAL MEDICINE

## 2018-10-18 PROCEDURE — 99153 MOD SED SAME PHYS/QHP EA: CPT | Performed by: INTERNAL MEDICINE

## 2018-10-18 PROCEDURE — 2709999900 HC NON-CHARGEABLE SUPPLY

## 2018-10-18 PROCEDURE — 93005 ELECTROCARDIOGRAM TRACING: CPT | Performed by: INTERNAL MEDICINE

## 2018-10-18 PROCEDURE — 2060000000 HC ICU INTERMEDIATE R&B

## 2018-10-18 PROCEDURE — 93653 COMPRE EP EVAL TX SVT: CPT | Performed by: INTERNAL MEDICINE

## 2018-10-18 PROCEDURE — 85025 COMPLETE CBC W/AUTO DIFF WBC: CPT

## 2018-10-18 PROCEDURE — 6370000000 HC RX 637 (ALT 250 FOR IP)

## 2018-10-18 PROCEDURE — 93621 COMP EP EVL L PAC&REC C SINS: CPT | Performed by: INTERNAL MEDICINE

## 2018-10-18 PROCEDURE — 80048 BASIC METABOLIC PNL TOTAL CA: CPT

## 2018-10-18 PROCEDURE — 2500000003 HC RX 250 WO HCPCS

## 2018-10-18 PROCEDURE — 6370000000 HC RX 637 (ALT 250 FOR IP): Performed by: INTERNAL MEDICINE

## 2018-10-18 PROCEDURE — 2580000003 HC RX 258: Performed by: INTERNAL MEDICINE

## 2018-10-18 PROCEDURE — C1733 CATH, EP, OTHR THAN COOL-TIP: HCPCS

## 2018-10-18 PROCEDURE — 85610 PROTHROMBIN TIME: CPT

## 2018-10-18 PROCEDURE — 93623 PRGRMD STIMJ&PACG IV RX NFS: CPT | Performed by: INTERNAL MEDICINE

## 2018-10-18 PROCEDURE — 99152 MOD SED SAME PHYS/QHP 5/>YRS: CPT | Performed by: INTERNAL MEDICINE

## 2018-10-18 PROCEDURE — C1894 INTRO/SHEATH, NON-LASER: HCPCS

## 2018-10-18 PROCEDURE — 2580000003 HC RX 258

## 2018-10-18 PROCEDURE — C1730 CATH, EP, 19 OR FEW ELECT: HCPCS

## 2018-10-18 RX ORDER — SODIUM CHLORIDE 9 MG/ML
1000 INJECTION, SOLUTION INTRAVENOUS CONTINUOUS
Status: DISCONTINUED | OUTPATIENT
Start: 2018-10-18 | End: 2018-10-19 | Stop reason: HOSPADM

## 2018-10-18 RX ORDER — SODIUM CHLORIDE 0.9 % (FLUSH) 0.9 %
10 SYRINGE (ML) INJECTION PRN
Status: DISCONTINUED | OUTPATIENT
Start: 2018-10-18 | End: 2018-10-19 | Stop reason: HOSPADM

## 2018-10-18 RX ORDER — CLOPIDOGREL BISULFATE 75 MG/1
75 TABLET ORAL DAILY
Status: DISCONTINUED | OUTPATIENT
Start: 2018-10-18 | End: 2018-10-19 | Stop reason: HOSPADM

## 2018-10-18 RX ORDER — FLUDROCORTISONE ACETATE 0.1 MG/1
0.1 TABLET ORAL 2 TIMES DAILY
Status: DISCONTINUED | OUTPATIENT
Start: 2018-10-18 | End: 2018-10-19 | Stop reason: HOSPADM

## 2018-10-18 RX ORDER — ACETAMINOPHEN 325 MG/1
650 TABLET ORAL EVERY 4 HOURS PRN
Status: DISCONTINUED | OUTPATIENT
Start: 2018-10-18 | End: 2018-10-19 | Stop reason: HOSPADM

## 2018-10-18 RX ORDER — POTASSIUM CHLORIDE 750 MG/1
10 TABLET, EXTENDED RELEASE ORAL DAILY
Status: DISCONTINUED | OUTPATIENT
Start: 2018-10-18 | End: 2018-10-19 | Stop reason: HOSPADM

## 2018-10-18 RX ORDER — SODIUM CHLORIDE 0.9 % (FLUSH) 0.9 %
10 SYRINGE (ML) INJECTION EVERY 12 HOURS SCHEDULED
Status: DISCONTINUED | OUTPATIENT
Start: 2018-10-18 | End: 2018-10-19 | Stop reason: HOSPADM

## 2018-10-18 RX ORDER — M-VIT,TX,IRON,MINS/CALC/FOLIC 27MG-0.4MG
1 TABLET ORAL DAILY
Status: DISCONTINUED | OUTPATIENT
Start: 2018-10-19 | End: 2018-10-19 | Stop reason: HOSPADM

## 2018-10-18 RX ORDER — AMIODARONE HYDROCHLORIDE 200 MG/1
100 TABLET ORAL DAILY
Status: DISCONTINUED | OUTPATIENT
Start: 2018-10-18 | End: 2018-10-19 | Stop reason: HOSPADM

## 2018-10-18 RX ORDER — VITAMIN E 268 MG
400 CAPSULE ORAL DAILY
Status: DISCONTINUED | OUTPATIENT
Start: 2018-10-18 | End: 2018-10-19 | Stop reason: HOSPADM

## 2018-10-18 RX ORDER — FUROSEMIDE 20 MG/1
20 TABLET ORAL DAILY PRN
Status: DISCONTINUED | OUTPATIENT
Start: 2018-10-18 | End: 2018-10-19 | Stop reason: HOSPADM

## 2018-10-18 RX ORDER — MIDAZOLAM HYDROCHLORIDE 1 MG/ML
1 INJECTION INTRAMUSCULAR; INTRAVENOUS ONCE
Status: COMPLETED | OUTPATIENT
Start: 2018-10-18 | End: 2018-10-18

## 2018-10-18 RX ADMIN — SODIUM CHLORIDE 1000 ML: 9 INJECTION, SOLUTION INTRAVENOUS at 12:56

## 2018-10-18 RX ADMIN — AMIODARONE HYDROCHLORIDE 100 MG: 200 TABLET ORAL at 20:31

## 2018-10-18 RX ADMIN — CLOPIDOGREL BISULFATE 75 MG: 75 TABLET ORAL at 20:30

## 2018-10-18 RX ADMIN — MIDAZOLAM HYDROCHLORIDE 1 MG: 1 INJECTION INTRAMUSCULAR; INTRAVENOUS at 12:56

## 2018-10-18 RX ADMIN — VITAMIN E CAP 400 UNIT 400 UNITS: 400 CAP at 20:30

## 2018-10-18 RX ADMIN — POTASSIUM CHLORIDE 10 MEQ: 750 TABLET, EXTENDED RELEASE ORAL at 20:30

## 2018-10-18 RX ADMIN — FLUDROCORTISONE ACETATE 0.1 MG: 0.1 TABLET ORAL at 20:30

## 2018-10-18 RX ADMIN — SODIUM CHLORIDE 1000 ML: 9 INJECTION, SOLUTION INTRAVENOUS at 23:18

## 2018-10-18 RX ADMIN — APIXABAN 5 MG: 5 TABLET, FILM COATED ORAL at 20:29

## 2018-10-18 NOTE — H&P
denied chest pain, palpitations, syncope, dizziness, shortness of breath or edema. His device check today,4/6/18 shows paroxysmal atrial fib and PAT. He did receive a shock on April 1st for atrial fib. He states he has been taking his medication as prescribed. He denied chest pain, palpitations, syncope, dizziness, shortness of breath or edema. On 6/14/18 he presented for follow up and device management. He states he has been having fatigue in the mornings. He also has leg pain. He states he has been dizzy and this has slightly improved after his Florinef has been increased. He checks his blood pressure at home and his SBP is always around 100. He underwent a left heart cath on 4/16/18 with PCI to distal LCX and PL branch. His echocardiogram from 3/28/17 demonstrated a LVEF of 30%. His device check shows his tachycardia starts with a PVC. ATP entrains A. EGM looks conducted. This terminates with with a PAC without resetting the V.  Device check 9/24/18 shows 105 episodes of apparent SVT since August 14th. He states he is dizzy at times. He states he does get SOB with exertion. He denies chest pain, palpitations, or syncope. He states he is not very active.      Past Medical History:   has a past medical history of Arthritis; Atrial fibrillation ([facility] [address] Cancer ([facility] [address] Cardiomyopathy [facility]); CHF (congestive heart failure) ([facility] [address] High cholesterol; Hypertension; LBBB (left bundle branch block); and MI (myocardial infarction) ([facility] [address]    Surgical History:   has a past surgical history that includes hernia repair; Ankle surgery; eye surgery (2010); Cataract removal with implant (1/27/11); Cataract removal with implant (2/24/11); joint replacement (8-2008); Skin cancer excision; Cardioversion (9/25/2015); Pacemaker insertion (Left, 12/2015); and other surgical history (Bilateral, 12/16/2016).    Social History:   reports that he quit smoking about 30 years ago.  He has never used smokeless tobacco. He reports that he drinks alcohol. He reports that he does not use drugs.      Family History:  family history includes Diabetes in his brother and mother; Heart Failure in his father and mother.      Home Medications:  Encounter Medications          Outpatient Encounter Prescriptions as of 9/24/2018   Medication Sig Dispense Refill    clopidogrel (PLAVIX) 75 MG tablet TAKE 1 TABLET BY MOUTH DAILY 90 tablet 3    spironolactone (ALDACTONE) 25 MG tablet Take 1/2 tablet daily 30 tablet 3    amiodarone (CORDARONE) 200 MG tablet Take 0.5 tablets by mouth daily 30 tablet 3    apixaban (ELIQUIS) 5 MG TABS tablet Take 1 tablet by mouth 2 times daily 60 tablet 5    potassium chloride (KLOR-CON M) 10 MEQ extended release tablet Take 4 tablets every day for one week. Then take 2 tablets every day after that (Patient taking differently: take 2 tablets every day) 60 tablet 3    fludrocortisone (FLORINEF) 0.1 MG tablet Take 1 tablet by mouth 2 times daily 180 tablet 0    Red Yeast Rice Extract (RED YEAST RICE PO) Take by mouth        furosemide (LASIX) 20 MG tablet Take 1 tablet by mouth daily as needed (for a 2 pound weight gain) 30 tablet 3    Aloe Vera 25 MG CAPS Take by mouth Not sure of dose        CINNAMON PO Take by mouth 2 times daily         multivitamin (THERAGRAN) per tablet Take 1 tablet by mouth daily.          Coenzyme Q10 (COQ-10 PO) Take  by mouth.          vitamin E 400 UNIT capsule Take 1 capsule by mouth daily.          No facility-administered encounter medications on file as of 9/24/2018.             Allergies:  Flomax [tamsulosin hcl] and Statins support therapy      Review of Systems   Constitutional: Negative. HENT: Negative. Eyes: Negative. Respiratory: Negative. Cardiovascular: Negative. Gastrointestinal: Negative. Genitourinary: Negative. Musculoskeletal: Negative. Skin: Negative. Neurological: Negative. Hematological: Negative.

## 2018-10-19 VITALS
TEMPERATURE: 97.2 F | HEIGHT: 73 IN | BODY MASS INDEX: 25.74 KG/M2 | OXYGEN SATURATION: 96 % | RESPIRATION RATE: 15 BRPM | SYSTOLIC BLOOD PRESSURE: 111 MMHG | DIASTOLIC BLOOD PRESSURE: 66 MMHG | WEIGHT: 194.22 LBS | HEART RATE: 70 BPM

## 2018-10-19 LAB
EKG ATRIAL RATE: 68 BPM
EKG ATRIAL RATE: 73 BPM
EKG DIAGNOSIS: NORMAL
EKG DIAGNOSIS: NORMAL
EKG Q-T INTERVAL: 480 MS
EKG Q-T INTERVAL: 484 MS
EKG QRS DURATION: 124 MS
EKG QRS DURATION: 140 MS
EKG QTC CALCULATION (BAZETT): 518 MS
EKG QTC CALCULATION (BAZETT): 522 MS
EKG R AXIS: 265 DEGREES
EKG R AXIS: 265 DEGREES
EKG T AXIS: 108 DEGREES
EKG T AXIS: 89 DEGREES
EKG VENTRICULAR RATE: 70 BPM
EKG VENTRICULAR RATE: 70 BPM

## 2018-10-19 PROCEDURE — 6370000000 HC RX 637 (ALT 250 FOR IP): Performed by: INTERNAL MEDICINE

## 2018-10-19 PROCEDURE — 99217 PR OBSERVATION CARE DISCHARGE MANAGEMENT: CPT | Performed by: INTERNAL MEDICINE

## 2018-10-19 PROCEDURE — 93010 ELECTROCARDIOGRAM REPORT: CPT | Performed by: INTERNAL MEDICINE

## 2018-10-19 RX ADMIN — POTASSIUM CHLORIDE 10 MEQ: 750 TABLET, EXTENDED RELEASE ORAL at 09:06

## 2018-10-19 RX ADMIN — AMIODARONE HYDROCHLORIDE 100 MG: 200 TABLET ORAL at 09:06

## 2018-10-19 RX ADMIN — VITAMIN E CAP 400 UNIT 400 UNITS: 400 CAP at 09:06

## 2018-10-19 RX ADMIN — Medication 1 TABLET: at 09:06

## 2018-10-19 RX ADMIN — CLOPIDOGREL BISULFATE 75 MG: 75 TABLET ORAL at 09:06

## 2018-10-19 RX ADMIN — FLUDROCORTISONE ACETATE 0.1 MG: 0.1 TABLET ORAL at 09:06

## 2018-10-19 RX ADMIN — APIXABAN 5 MG: 5 TABLET, FILM COATED ORAL at 09:06

## 2018-10-19 NOTE — PROGRESS NOTES
Patient discharge completed. Discharge information included information on diagnosis including signs and symptoms, complications and when to seek medical attention. Patient verbalized understanding of all discharge information. Patient escorted out by staff with all documented belongings. Home with family.

## 2018-10-22 RX ORDER — FLUDROCORTISONE ACETATE 0.1 MG/1
TABLET ORAL
Qty: 180 TABLET | Refills: 0 | Status: SHIPPED | OUTPATIENT
Start: 2018-10-22 | End: 2019-02-04 | Stop reason: SDUPTHER

## 2018-11-09 RX ORDER — FLUDROCORTISONE ACETATE 0.1 MG/1
TABLET ORAL
Qty: 60 TABLET | Refills: 0 | Status: SHIPPED | OUTPATIENT
Start: 2018-11-09 | End: 2019-02-04 | Stop reason: SDUPTHER

## 2018-11-12 ENCOUNTER — TELEPHONE (OUTPATIENT)
Dept: CARDIOLOGY CLINIC | Age: 83
End: 2018-11-12

## 2018-11-14 ENCOUNTER — OFFICE VISIT (OUTPATIENT)
Dept: CARDIOLOGY CLINIC | Age: 83
End: 2018-11-14
Payer: MEDICARE

## 2018-11-14 VITALS
SYSTOLIC BLOOD PRESSURE: 118 MMHG | HEART RATE: 67 BPM | BODY MASS INDEX: 26.11 KG/M2 | DIASTOLIC BLOOD PRESSURE: 62 MMHG | WEIGHT: 197 LBS | HEIGHT: 73 IN | OXYGEN SATURATION: 96 %

## 2018-11-14 DIAGNOSIS — I42.8 NON-ISCHEMIC CARDIOMYOPATHY (HCC): Primary | ICD-10-CM

## 2018-11-14 DIAGNOSIS — I48.19 PERSISTENT ATRIAL FIBRILLATION (HCC): ICD-10-CM

## 2018-11-14 DIAGNOSIS — I25.10 CORONARY ARTERY DISEASE INVOLVING NATIVE CORONARY ARTERY OF NATIVE HEART WITHOUT ANGINA PECTORIS: ICD-10-CM

## 2018-11-14 DIAGNOSIS — I47.1 SVT (SUPRAVENTRICULAR TACHYCARDIA) (HCC): ICD-10-CM

## 2018-11-14 DIAGNOSIS — Z95.810 BIVENTRICULAR ICD (IMPLANTABLE CARDIOVERTER-DEFIBRILLATOR) IN PLACE: ICD-10-CM

## 2018-11-14 DIAGNOSIS — E78.2 MIXED HYPERLIPIDEMIA: ICD-10-CM

## 2018-11-14 DIAGNOSIS — I50.22 CHRONIC SYSTOLIC CONGESTIVE HEART FAILURE (HCC): ICD-10-CM

## 2018-11-14 DIAGNOSIS — I10 ESSENTIAL HYPERTENSION: ICD-10-CM

## 2018-11-14 PROCEDURE — 93000 ELECTROCARDIOGRAM COMPLETE: CPT | Performed by: NURSE PRACTITIONER

## 2018-11-14 PROCEDURE — 99214 OFFICE O/P EST MOD 30 MIN: CPT | Performed by: NURSE PRACTITIONER

## 2018-11-20 ENCOUNTER — HOSPITAL ENCOUNTER (OUTPATIENT)
Age: 83
Discharge: HOME | End: 2018-11-20
Payer: MEDICARE

## 2018-11-20 DIAGNOSIS — I50.22 CHRONIC SYSTOLIC CONGESTIVE HEART FAILURE (HCC): ICD-10-CM

## 2018-11-20 DIAGNOSIS — I42.8 NON-ISCHEMIC CARDIOMYOPATHY (HCC): ICD-10-CM

## 2018-11-20 DIAGNOSIS — I25.10 CORONARY ARTERY DISEASE INVOLVING NATIVE CORONARY ARTERY OF NATIVE HEART WITHOUT ANGINA PECTORIS: ICD-10-CM

## 2018-11-20 LAB
ANION GAP SERPL CALCULATED.3IONS-SCNC: 13 MMOL/L (ref 3–16)
BUN BLDV-MCNC: 21 MG/DL (ref 7–20)
CALCIUM SERPL-MCNC: 8.6 MG/DL (ref 8.3–10.6)
CHLORIDE BLD-SCNC: 102 MMOL/L (ref 99–110)
CO2: 28 MMOL/L (ref 21–32)
CREAT SERPL-MCNC: 1.2 MG/DL (ref 0.8–1.3)
GFR AFRICAN AMERICAN: >60
GFR NON-AFRICAN AMERICAN: 57
GLUCOSE BLD-MCNC: 100 MG/DL (ref 70–99)
POTASSIUM SERPL-SCNC: 3.2 MMOL/L (ref 3.5–5.1)
SODIUM BLD-SCNC: 143 MMOL/L (ref 136–145)

## 2018-11-20 PROCEDURE — 80048 BASIC METABOLIC PNL TOTAL CA: CPT

## 2018-11-20 PROCEDURE — 36415 COLL VENOUS BLD VENIPUNCTURE: CPT

## 2018-11-21 RX ORDER — POTASSIUM CHLORIDE 20 MEQ/1
20 TABLET, EXTENDED RELEASE ORAL 2 TIMES DAILY
Qty: 60 TABLET | Refills: 1 | Status: SHIPPED | OUTPATIENT
Start: 2018-11-21 | End: 2019-01-23 | Stop reason: SDUPTHER

## 2018-11-26 ENCOUNTER — TELEPHONE (OUTPATIENT)
Dept: CARDIOLOGY CLINIC | Age: 83
End: 2018-11-26

## 2018-11-30 ENCOUNTER — TELEPHONE (OUTPATIENT)
Dept: CARDIOLOGY CLINIC | Age: 83
End: 2018-11-30

## 2018-11-30 DIAGNOSIS — I50.22 CHRONIC SYSTOLIC CONGESTIVE HEART FAILURE (HCC): ICD-10-CM

## 2018-11-30 DIAGNOSIS — I42.8 NON-ISCHEMIC CARDIOMYOPATHY (HCC): Primary | ICD-10-CM

## 2018-11-30 DIAGNOSIS — E87.6 HYPOKALEMIA: ICD-10-CM

## 2018-12-04 ENCOUNTER — HOSPITAL ENCOUNTER (OUTPATIENT)
Age: 83
Discharge: HOME | End: 2018-12-04
Payer: MEDICARE

## 2018-12-04 DIAGNOSIS — I50.22 CHRONIC SYSTOLIC CONGESTIVE HEART FAILURE (HCC): ICD-10-CM

## 2018-12-04 DIAGNOSIS — E87.6 HYPOKALEMIA: ICD-10-CM

## 2018-12-04 DIAGNOSIS — I42.8 NON-ISCHEMIC CARDIOMYOPATHY (HCC): ICD-10-CM

## 2018-12-04 LAB
ANION GAP SERPL CALCULATED.3IONS-SCNC: 12 MMOL/L (ref 3–16)
BUN BLDV-MCNC: 20 MG/DL (ref 7–20)
CALCIUM SERPL-MCNC: 8.7 MG/DL (ref 8.3–10.6)
CHLORIDE BLD-SCNC: 101 MMOL/L (ref 99–110)
CO2: 27 MMOL/L (ref 21–32)
CREAT SERPL-MCNC: 1.2 MG/DL (ref 0.8–1.3)
GFR AFRICAN AMERICAN: >60
GFR NON-AFRICAN AMERICAN: 57
GLUCOSE BLD-MCNC: 112 MG/DL (ref 70–99)
POTASSIUM SERPL-SCNC: 3.8 MMOL/L (ref 3.5–5.1)
SODIUM BLD-SCNC: 140 MMOL/L (ref 136–145)

## 2018-12-04 PROCEDURE — 80048 BASIC METABOLIC PNL TOTAL CA: CPT

## 2018-12-04 PROCEDURE — 36415 COLL VENOUS BLD VENIPUNCTURE: CPT

## 2018-12-05 ENCOUNTER — TELEPHONE (OUTPATIENT)
Dept: CARDIOLOGY CLINIC | Age: 83
End: 2018-12-05

## 2018-12-05 NOTE — TELEPHONE ENCOUNTER
Spoke with Maryuri Zapata, relayed message epr NPDD regarding lab results. Pt verbalized understanding.

## 2018-12-10 ENCOUNTER — TELEPHONE (OUTPATIENT)
Dept: CARDIOLOGY CLINIC | Age: 83
End: 2018-12-10

## 2019-01-01 ENCOUNTER — HOSPITAL ENCOUNTER (OUTPATIENT)
Age: 84
Discharge: HOME | End: 2019-08-29
Payer: MEDICARE

## 2019-01-01 ENCOUNTER — HOSPITAL ENCOUNTER (OUTPATIENT)
Age: 84
Discharge: HOME | End: 2019-11-14
Payer: MEDICARE

## 2019-01-01 ENCOUNTER — HOSPITAL ENCOUNTER (OUTPATIENT)
Age: 84
Discharge: HOME | End: 2019-12-06
Payer: MEDICARE

## 2019-01-01 ENCOUNTER — HOSPITAL ENCOUNTER (OUTPATIENT)
Age: 84
Discharge: HOME | End: 2019-12-27
Payer: MEDICARE

## 2019-01-01 ENCOUNTER — APPOINTMENT (OUTPATIENT)
Dept: GENERAL RADIOLOGY | Age: 84
DRG: 287 | End: 2019-01-01
Payer: MEDICARE

## 2019-01-01 ENCOUNTER — HOSPITAL ENCOUNTER (OUTPATIENT)
Age: 84
Discharge: HOME | End: 2019-08-12
Payer: MEDICARE

## 2019-01-01 ENCOUNTER — TELEPHONE (OUTPATIENT)
Dept: CARDIOLOGY CLINIC | Age: 84
End: 2019-01-01

## 2019-01-01 ENCOUNTER — HOSPITAL ENCOUNTER (OUTPATIENT)
Dept: CARDIAC CATH/INVASIVE PROCEDURES | Age: 84
Discharge: HOME | End: 2019-10-17
Attending: INTERNAL MEDICINE | Admitting: INTERNAL MEDICINE
Payer: MEDICARE

## 2019-01-01 ENCOUNTER — HOSPITAL ENCOUNTER (INPATIENT)
Age: 84
LOS: 1 days | Discharge: HOME HEALTH | DRG: 291 | End: 2019-10-20
Attending: EMERGENCY MEDICINE | Admitting: INTERNAL MEDICINE
Payer: MEDICARE

## 2019-01-01 ENCOUNTER — OFFICE VISIT (OUTPATIENT)
Dept: FAMILY MEDICINE CLINIC | Age: 84
End: 2019-01-01
Payer: MEDICARE

## 2019-01-01 ENCOUNTER — OFFICE VISIT (OUTPATIENT)
Dept: CARDIOLOGY CLINIC | Age: 84
End: 2019-01-01
Payer: MEDICARE

## 2019-01-01 ENCOUNTER — APPOINTMENT (OUTPATIENT)
Dept: NUCLEAR MEDICINE | Age: 84
DRG: 287 | End: 2019-01-01
Payer: MEDICARE

## 2019-01-01 ENCOUNTER — APPOINTMENT (OUTPATIENT)
Dept: GENERAL RADIOLOGY | Age: 84
DRG: 291 | End: 2019-01-01
Payer: MEDICARE

## 2019-01-01 ENCOUNTER — CARE COORDINATION (OUTPATIENT)
Dept: CASE MANAGEMENT | Age: 84
End: 2019-01-01

## 2019-01-01 ENCOUNTER — NURSE ONLY (OUTPATIENT)
Dept: CARDIOLOGY CLINIC | Age: 84
End: 2019-01-01

## 2019-01-01 ENCOUNTER — NURSE ONLY (OUTPATIENT)
Dept: CARDIOLOGY CLINIC | Age: 84
End: 2019-01-01
Payer: MEDICARE

## 2019-01-01 ENCOUNTER — HOSPITAL ENCOUNTER (OUTPATIENT)
Age: 84
Discharge: HOME | End: 2019-11-08
Payer: MEDICARE

## 2019-01-01 ENCOUNTER — HOSPITAL ENCOUNTER (OUTPATIENT)
Dept: NUCLEAR MEDICINE | Age: 84
Discharge: HOME | End: 2019-10-24
Payer: MEDICARE

## 2019-01-01 ENCOUNTER — HOSPITAL ENCOUNTER (OUTPATIENT)
Dept: GENERAL RADIOLOGY | Age: 84
Discharge: HOME | End: 2019-08-12
Payer: MEDICARE

## 2019-01-01 ENCOUNTER — TELEPHONE (OUTPATIENT)
Dept: CARDIOLOGY | Age: 84
End: 2019-01-01

## 2019-01-01 ENCOUNTER — HOSPITAL ENCOUNTER (INPATIENT)
Age: 84
LOS: 3 days | Discharge: HOME | DRG: 287 | End: 2019-09-14
Attending: EMERGENCY MEDICINE | Admitting: INTERNAL MEDICINE
Payer: MEDICARE

## 2019-01-01 ENCOUNTER — HOSPITAL ENCOUNTER (OUTPATIENT)
Age: 84
Discharge: HOME | End: 2019-10-31
Payer: MEDICARE

## 2019-01-01 ENCOUNTER — HOSPITAL ENCOUNTER (OUTPATIENT)
Age: 84
Discharge: HOME | End: 2019-04-25
Payer: MEDICARE

## 2019-01-01 ENCOUNTER — INITIAL CONSULT (OUTPATIENT)
Dept: GASTROENTEROLOGY | Age: 84
End: 2019-01-01
Payer: MEDICARE

## 2019-01-01 ENCOUNTER — HOSPITAL ENCOUNTER (OUTPATIENT)
Dept: GENERAL RADIOLOGY | Age: 84
Discharge: HOME | End: 2019-12-27
Payer: MEDICARE

## 2019-01-01 ENCOUNTER — APPOINTMENT (OUTPATIENT)
Dept: CT IMAGING | Age: 84
DRG: 287 | End: 2019-01-01
Payer: MEDICARE

## 2019-01-01 ENCOUNTER — PROCEDURE VISIT (OUTPATIENT)
Dept: CARDIOLOGY CLINIC | Age: 84
End: 2019-01-01
Payer: MEDICARE

## 2019-01-01 ENCOUNTER — FOLLOWUP TELEPHONE ENCOUNTER (OUTPATIENT)
Dept: CARDIAC REHAB | Age: 84
End: 2019-01-01

## 2019-01-01 VITALS
DIASTOLIC BLOOD PRESSURE: 60 MMHG | BODY MASS INDEX: 24.92 KG/M2 | HEIGHT: 73 IN | WEIGHT: 188 LBS | SYSTOLIC BLOOD PRESSURE: 90 MMHG | OXYGEN SATURATION: 98 % | HEART RATE: 66 BPM

## 2019-01-01 VITALS
HEIGHT: 72 IN | HEART RATE: 60 BPM | BODY MASS INDEX: 25.13 KG/M2 | OXYGEN SATURATION: 98 % | OXYGEN SATURATION: 95 % | HEART RATE: 72 BPM | SYSTOLIC BLOOD PRESSURE: 108 MMHG | DIASTOLIC BLOOD PRESSURE: 72 MMHG | HEIGHT: 73 IN | WEIGHT: 184 LBS | WEIGHT: 189.6 LBS | DIASTOLIC BLOOD PRESSURE: 62 MMHG | SYSTOLIC BLOOD PRESSURE: 120 MMHG | BODY MASS INDEX: 24.92 KG/M2

## 2019-01-01 VITALS
TEMPERATURE: 97.8 F | HEART RATE: 90 BPM | SYSTOLIC BLOOD PRESSURE: 111 MMHG | DIASTOLIC BLOOD PRESSURE: 68 MMHG | WEIGHT: 179.8 LBS | OXYGEN SATURATION: 95 % | HEIGHT: 73 IN | BODY MASS INDEX: 23.83 KG/M2 | RESPIRATION RATE: 19 BRPM

## 2019-01-01 VITALS
WEIGHT: 186 LBS | SYSTOLIC BLOOD PRESSURE: 110 MMHG | HEART RATE: 71 BPM | BODY MASS INDEX: 24.65 KG/M2 | OXYGEN SATURATION: 98 % | HEIGHT: 73 IN | DIASTOLIC BLOOD PRESSURE: 66 MMHG

## 2019-01-01 VITALS
OXYGEN SATURATION: 96 % | TEMPERATURE: 97.5 F | DIASTOLIC BLOOD PRESSURE: 72 MMHG | SYSTOLIC BLOOD PRESSURE: 108 MMHG | HEART RATE: 69 BPM | WEIGHT: 186 LBS | BODY MASS INDEX: 24.54 KG/M2

## 2019-01-01 VITALS
RESPIRATION RATE: 18 BRPM | DIASTOLIC BLOOD PRESSURE: 56 MMHG | OXYGEN SATURATION: 99 % | HEART RATE: 69 BPM | WEIGHT: 175.8 LBS | TEMPERATURE: 96.1 F | BODY MASS INDEX: 23.3 KG/M2 | HEIGHT: 73 IN | SYSTOLIC BLOOD PRESSURE: 90 MMHG

## 2019-01-01 VITALS
HEIGHT: 73 IN | HEART RATE: 66 BPM | SYSTOLIC BLOOD PRESSURE: 90 MMHG | DIASTOLIC BLOOD PRESSURE: 60 MMHG | WEIGHT: 188.8 LBS | OXYGEN SATURATION: 98 % | BODY MASS INDEX: 25.02 KG/M2

## 2019-01-01 VITALS
WEIGHT: 181 LBS | SYSTOLIC BLOOD PRESSURE: 90 MMHG | HEIGHT: 73 IN | BODY MASS INDEX: 23.99 KG/M2 | HEART RATE: 78 BPM | DIASTOLIC BLOOD PRESSURE: 60 MMHG | OXYGEN SATURATION: 96 %

## 2019-01-01 VITALS
DIASTOLIC BLOOD PRESSURE: 52 MMHG | HEIGHT: 72 IN | BODY MASS INDEX: 24.38 KG/M2 | WEIGHT: 180 LBS | SYSTOLIC BLOOD PRESSURE: 84 MMHG

## 2019-01-01 VITALS
BODY MASS INDEX: 24.38 KG/M2 | HEIGHT: 72 IN | DIASTOLIC BLOOD PRESSURE: 68 MMHG | WEIGHT: 180 LBS | HEART RATE: 71 BPM | OXYGEN SATURATION: 93 % | SYSTOLIC BLOOD PRESSURE: 102 MMHG

## 2019-01-01 VITALS
SYSTOLIC BLOOD PRESSURE: 96 MMHG | HEART RATE: 68 BPM | DIASTOLIC BLOOD PRESSURE: 64 MMHG | HEIGHT: 72 IN | OXYGEN SATURATION: 96 % | BODY MASS INDEX: 24.41 KG/M2

## 2019-01-01 VITALS — BODY MASS INDEX: 25.05 KG/M2 | WEIGHT: 189 LBS | HEART RATE: 60 BPM | HEIGHT: 73 IN

## 2019-01-01 DIAGNOSIS — R06.02 SOB (SHORTNESS OF BREATH): ICD-10-CM

## 2019-01-01 DIAGNOSIS — I25.10 CORONARY ARTERY DISEASE INVOLVING NATIVE CORONARY ARTERY OF NATIVE HEART WITHOUT ANGINA PECTORIS: ICD-10-CM

## 2019-01-01 DIAGNOSIS — H61.23 BILATERAL IMPACTED CERUMEN: ICD-10-CM

## 2019-01-01 DIAGNOSIS — I47.29 NSVT (NONSUSTAINED VENTRICULAR TACHYCARDIA): ICD-10-CM

## 2019-01-01 DIAGNOSIS — I50.22 CHRONIC SYSTOLIC CONGESTIVE HEART FAILURE (HCC): Primary | ICD-10-CM

## 2019-01-01 DIAGNOSIS — I50.43 ACUTE ON CHRONIC COMBINED SYSTOLIC AND DIASTOLIC CONGESTIVE HEART FAILURE (HCC): Primary | ICD-10-CM

## 2019-01-01 DIAGNOSIS — Z95.810 BIVENTRICULAR ICD (IMPLANTABLE CARDIOVERTER-DEFIBRILLATOR) IN PLACE: ICD-10-CM

## 2019-01-01 DIAGNOSIS — I48.19 PERSISTENT ATRIAL FIBRILLATION (HCC): ICD-10-CM

## 2019-01-01 DIAGNOSIS — I25.10 CORONARY ARTERY DISEASE INVOLVING NATIVE CORONARY ARTERY OF NATIVE HEART WITHOUT ANGINA PECTORIS: Primary | ICD-10-CM

## 2019-01-01 DIAGNOSIS — E78.2 MIXED HYPERLIPIDEMIA: ICD-10-CM

## 2019-01-01 DIAGNOSIS — I50.22 CHRONIC SYSTOLIC CONGESTIVE HEART FAILURE (HCC): ICD-10-CM

## 2019-01-01 DIAGNOSIS — N18.30 STAGE 3 CHRONIC KIDNEY DISEASE (HCC): ICD-10-CM

## 2019-01-01 DIAGNOSIS — I42.0 DILATED CARDIOMYOPATHY (HCC): ICD-10-CM

## 2019-01-01 DIAGNOSIS — I48.0 PAROXYSMAL ATRIAL FIBRILLATION (HCC): ICD-10-CM

## 2019-01-01 DIAGNOSIS — Z78.9 STATIN INTOLERANCE: ICD-10-CM

## 2019-01-01 DIAGNOSIS — I47.1 ATRIAL TACHYCARDIA (HCC): ICD-10-CM

## 2019-01-01 DIAGNOSIS — I50.9 CHF (NYHA CLASS III, ACC/AHA STAGE C) (HCC): ICD-10-CM

## 2019-01-01 DIAGNOSIS — R77.8 ELEVATED TROPONIN: ICD-10-CM

## 2019-01-01 DIAGNOSIS — I42.8 NON-ISCHEMIC CARDIOMYOPATHY (HCC): ICD-10-CM

## 2019-01-01 DIAGNOSIS — I48.19 ATRIAL FIBRILLATION, PERSISTENT (HCC): ICD-10-CM

## 2019-01-01 DIAGNOSIS — R19.7 DIARRHEA, UNSPECIFIED TYPE: Primary | ICD-10-CM

## 2019-01-01 DIAGNOSIS — I10 ESSENTIAL HYPERTENSION: ICD-10-CM

## 2019-01-01 DIAGNOSIS — Z95.810 BIVENTRICULAR IMPLANTABLE CARDIOVERTER-DEFIBRILLATOR (ICD) IN SITU: ICD-10-CM

## 2019-01-01 DIAGNOSIS — I44.7 LBBB (LEFT BUNDLE BRANCH BLOCK): ICD-10-CM

## 2019-01-01 DIAGNOSIS — J96.01 ACUTE RESPIRATORY FAILURE WITH HYPOXIA (HCC): ICD-10-CM

## 2019-01-01 DIAGNOSIS — I42.8 NON-ISCHEMIC CARDIOMYOPATHY (HCC): Primary | ICD-10-CM

## 2019-01-01 DIAGNOSIS — I25.5 ISCHEMIC CARDIOMYOPATHY: ICD-10-CM

## 2019-01-01 DIAGNOSIS — E78.00 HYPERCHOLESTEROLEMIA: Primary | ICD-10-CM

## 2019-01-01 DIAGNOSIS — I42.9 CARDIOMYOPATHY (HCC): ICD-10-CM

## 2019-01-01 DIAGNOSIS — Z79.899 MEDICATION MANAGEMENT: Primary | ICD-10-CM

## 2019-01-01 DIAGNOSIS — I47.1 SVT (SUPRAVENTRICULAR TACHYCARDIA) (HCC): ICD-10-CM

## 2019-01-01 DIAGNOSIS — Z95.810 BIVENTRICULAR IMPLANTABLE CARDIOVERTER-DEFIBRILLATOR (ICD) IN SITU: Primary | ICD-10-CM

## 2019-01-01 DIAGNOSIS — R42 DIZZINESS: ICD-10-CM

## 2019-01-01 DIAGNOSIS — I47.1 ATRIAL TACHYCARDIA (HCC): Primary | ICD-10-CM

## 2019-01-01 DIAGNOSIS — N28.9 KIDNEY FUNCTION ABNORMAL: ICD-10-CM

## 2019-01-01 DIAGNOSIS — I42.9 CARDIOMYOPATHY, UNSPECIFIED TYPE (HCC): ICD-10-CM

## 2019-01-01 DIAGNOSIS — I48.91 ATRIAL FIBRILLATION, UNSPECIFIED TYPE (HCC): Primary | ICD-10-CM

## 2019-01-01 DIAGNOSIS — R09.02 HYPOXIA: ICD-10-CM

## 2019-01-01 DIAGNOSIS — I48.91 ATRIAL FIBRILLATION, UNSPECIFIED TYPE (HCC): ICD-10-CM

## 2019-01-01 DIAGNOSIS — R19.7 DIARRHEA, UNSPECIFIED TYPE: ICD-10-CM

## 2019-01-01 DIAGNOSIS — D16.5: Primary | ICD-10-CM

## 2019-01-01 DIAGNOSIS — I25.119 CORONARY ARTERY DISEASE INVOLVING NATIVE CORONARY ARTERY OF NATIVE HEART WITH ANGINA PECTORIS (HCC): ICD-10-CM

## 2019-01-01 DIAGNOSIS — I42.8 OTHER CARDIOMYOPATHY (HCC): ICD-10-CM

## 2019-01-01 DIAGNOSIS — I48.19 PERSISTENT ATRIAL FIBRILLATION (HCC): Primary | ICD-10-CM

## 2019-01-01 DIAGNOSIS — R06.02 SHORTNESS OF BREATH: ICD-10-CM

## 2019-01-01 DIAGNOSIS — I49.3 PVC (PREMATURE VENTRICULAR CONTRACTION): ICD-10-CM

## 2019-01-01 DIAGNOSIS — I50.9 ACUTE ON CHRONIC CONGESTIVE HEART FAILURE, UNSPECIFIED HEART FAILURE TYPE (HCC): Primary | ICD-10-CM

## 2019-01-01 LAB
A/G RATIO: 1.3 (ref 1.1–2.2)
A/G RATIO: 1.3 (ref 1.1–2.2)
A/G RATIO: 1.4 (ref 1.1–2.2)
ALBUMIN SERPL-MCNC: 3.4 G/DL (ref 3.4–5)
ALBUMIN SERPL-MCNC: 3.6 G/DL (ref 3.1–4.9)
ALBUMIN SERPL-MCNC: 3.8 G/DL (ref 3.4–5)
ALBUMIN SERPL-MCNC: 3.8 G/DL (ref 3.4–5)
ALBUMIN SERPL-MCNC: 4 G/DL (ref 3.4–5)
ALBUMIN SERPL-MCNC: 4.2 G/DL (ref 3.4–5)
ALP BLD-CCNC: 68 U/L (ref 40–129)
ALP BLD-CCNC: 69 U/L (ref 40–129)
ALP BLD-CCNC: 89 U/L (ref 40–129)
ALPHA-1-GLOBULIN: 0.3 G/DL (ref 0.2–0.4)
ALPHA-2-GLOBULIN: 0.9 G/DL (ref 0.4–1.1)
ALT SERPL-CCNC: 16 U/L (ref 10–40)
ALT SERPL-CCNC: 9 U/L (ref 10–40)
ALT SERPL-CCNC: 9 U/L (ref 10–40)
ANION GAP SERPL CALCULATED.3IONS-SCNC: 11 MMOL/L (ref 3–16)
ANION GAP SERPL CALCULATED.3IONS-SCNC: 11 MMOL/L (ref 3–16)
ANION GAP SERPL CALCULATED.3IONS-SCNC: 12 MMOL/L (ref 3–16)
ANION GAP SERPL CALCULATED.3IONS-SCNC: 12 MMOL/L (ref 3–16)
ANION GAP SERPL CALCULATED.3IONS-SCNC: 13 MMOL/L (ref 3–16)
ANION GAP SERPL CALCULATED.3IONS-SCNC: 13 MMOL/L (ref 3–16)
ANION GAP SERPL CALCULATED.3IONS-SCNC: 14 MMOL/L (ref 3–16)
ANION GAP SERPL CALCULATED.3IONS-SCNC: 15 MMOL/L (ref 3–16)
ANION GAP SERPL CALCULATED.3IONS-SCNC: 15 MMOL/L (ref 3–16)
AST SERPL-CCNC: 16 U/L (ref 15–37)
AST SERPL-CCNC: 17 U/L (ref 15–37)
AST SERPL-CCNC: 24 U/L (ref 15–37)
BASOPHILS ABSOLUTE: 0 K/UL (ref 0–0.2)
BASOPHILS RELATIVE PERCENT: 0.3 %
BASOPHILS RELATIVE PERCENT: 0.4 %
BASOPHILS RELATIVE PERCENT: 0.7 %
BETA GLOBULIN: 1 G/DL (ref 0.9–1.6)
BILIRUB SERPL-MCNC: 0.7 MG/DL (ref 0–1)
BILIRUB SERPL-MCNC: 0.8 MG/DL (ref 0–1)
BILIRUB SERPL-MCNC: 0.8 MG/DL (ref 0–1)
BUN BLDV-MCNC: 23 MG/DL (ref 7–20)
BUN BLDV-MCNC: 25 MG/DL (ref 7–20)
BUN BLDV-MCNC: 25 MG/DL (ref 7–20)
BUN BLDV-MCNC: 28 MG/DL (ref 7–20)
BUN BLDV-MCNC: 28 MG/DL (ref 7–20)
BUN BLDV-MCNC: 29 MG/DL (ref 7–20)
BUN BLDV-MCNC: 29 MG/DL (ref 7–20)
BUN BLDV-MCNC: 30 MG/DL (ref 7–20)
BUN BLDV-MCNC: 30 MG/DL (ref 7–20)
BUN BLDV-MCNC: 31 MG/DL (ref 7–20)
BUN BLDV-MCNC: 31 MG/DL (ref 7–20)
BUN BLDV-MCNC: 32 MG/DL (ref 7–20)
BUN BLDV-MCNC: 35 MG/DL (ref 7–20)
CALCIUM SERPL-MCNC: 8.5 MG/DL (ref 8.3–10.6)
CALCIUM SERPL-MCNC: 8.7 MG/DL (ref 8.3–10.6)
CALCIUM SERPL-MCNC: 8.8 MG/DL (ref 8.3–10.6)
CALCIUM SERPL-MCNC: 8.9 MG/DL (ref 8.3–10.6)
CALCIUM SERPL-MCNC: 9 MG/DL (ref 8.3–10.6)
CALCIUM SERPL-MCNC: 9.1 MG/DL (ref 8.3–10.6)
CALCIUM SERPL-MCNC: 9.1 MG/DL (ref 8.3–10.6)
CHLORIDE BLD-SCNC: 100 MMOL/L (ref 99–110)
CHLORIDE BLD-SCNC: 103 MMOL/L (ref 99–110)
CHLORIDE BLD-SCNC: 104 MMOL/L (ref 99–110)
CHLORIDE BLD-SCNC: 105 MMOL/L (ref 99–110)
CHLORIDE BLD-SCNC: 106 MMOL/L (ref 99–110)
CHLORIDE BLD-SCNC: 107 MMOL/L (ref 99–110)
CHLORIDE BLD-SCNC: 98 MMOL/L (ref 99–110)
CHLORIDE BLD-SCNC: 99 MMOL/L (ref 99–110)
CHOLESTEROL, FASTING: 220 MG/DL (ref 0–199)
CHOLESTEROL, TOTAL: 177 MG/DL (ref 0–199)
CO2: 21 MMOL/L (ref 21–32)
CO2: 21 MMOL/L (ref 21–32)
CO2: 22 MMOL/L (ref 21–32)
CO2: 22 MMOL/L (ref 21–32)
CO2: 23 MMOL/L (ref 21–32)
CO2: 24 MMOL/L (ref 21–32)
CO2: 25 MMOL/L (ref 21–32)
CO2: 27 MMOL/L (ref 21–32)
CREAT SERPL-MCNC: 1.2 MG/DL (ref 0.8–1.3)
CREAT SERPL-MCNC: 1.3 MG/DL (ref 0.8–1.3)
CREAT SERPL-MCNC: 1.4 MG/DL (ref 0.8–1.3)
CREAT SERPL-MCNC: 1.5 MG/DL (ref 0.8–1.3)
EKG ATRIAL RATE: 69 BPM
EKG ATRIAL RATE: 78 BPM
EKG ATRIAL RATE: 79 BPM
EKG ATRIAL RATE: 94 BPM
EKG DIAGNOSIS: NORMAL
EKG P AXIS: 66 DEGREES
EKG P-R INTERVAL: 128 MS
EKG P-R INTERVAL: 96 MS
EKG Q-T INTERVAL: 440 MS
EKG Q-T INTERVAL: 440 MS
EKG Q-T INTERVAL: 472 MS
EKG Q-T INTERVAL: 486 MS
EKG QRS DURATION: 122 MS
EKG QRS DURATION: 132 MS
EKG QRS DURATION: 134 MS
EKG QRS DURATION: 142 MS
EKG QTC CALCULATION (BAZETT): 504 MS
EKG QTC CALCULATION (BAZETT): 524 MS
EKG QTC CALCULATION (BAZETT): 538 MS
EKG QTC CALCULATION (BAZETT): 550 MS
EKG R AXIS: -13 DEGREES
EKG R AXIS: -87 DEGREES
EKG R AXIS: 214 DEGREES
EKG R AXIS: 268 DEGREES
EKG T AXIS: -15 DEGREES
EKG T AXIS: 189 DEGREES
EKG T AXIS: 91 DEGREES
EKG T AXIS: 93 DEGREES
EKG VENTRICULAR RATE: 70 BPM
EKG VENTRICULAR RATE: 78 BPM
EKG VENTRICULAR RATE: 79 BPM
EKG VENTRICULAR RATE: 94 BPM
EOSINOPHILS ABSOLUTE: 0 K/UL (ref 0–0.6)
EOSINOPHILS ABSOLUTE: 0 K/UL (ref 0–0.6)
EOSINOPHILS ABSOLUTE: 0.1 K/UL (ref 0–0.6)
EOSINOPHILS RELATIVE PERCENT: 0.2 %
EOSINOPHILS RELATIVE PERCENT: 0.6 %
EOSINOPHILS RELATIVE PERCENT: 1.2 %
ESTIMATED AVERAGE GLUCOSE: 116.9 MG/DL
GAMMA GLOBULIN: 0.9 G/DL (ref 0.6–1.8)
GFR AFRICAN AMERICAN: 53
GFR AFRICAN AMERICAN: 58
GFR AFRICAN AMERICAN: >60
GFR NON-AFRICAN AMERICAN: 44
GFR NON-AFRICAN AMERICAN: 48
GFR NON-AFRICAN AMERICAN: 52
GFR NON-AFRICAN AMERICAN: 57
GLOBULIN: 2.9 G/DL
GLOBULIN: 3.1 G/DL
GLOBULIN: 3.2 G/DL
GLUCOSE BLD-MCNC: 101 MG/DL (ref 70–99)
GLUCOSE BLD-MCNC: 104 MG/DL (ref 70–99)
GLUCOSE BLD-MCNC: 105 MG/DL (ref 70–99)
GLUCOSE BLD-MCNC: 106 MG/DL (ref 70–99)
GLUCOSE BLD-MCNC: 106 MG/DL (ref 70–99)
GLUCOSE BLD-MCNC: 107 MG/DL (ref 70–99)
GLUCOSE BLD-MCNC: 119 MG/DL (ref 70–99)
GLUCOSE BLD-MCNC: 119 MG/DL (ref 70–99)
GLUCOSE BLD-MCNC: 125 MG/DL (ref 70–99)
GLUCOSE BLD-MCNC: 129 MG/DL (ref 70–99)
GLUCOSE BLD-MCNC: 136 MG/DL (ref 70–99)
GLUCOSE BLD-MCNC: 95 MG/DL (ref 70–99)
GLUCOSE FASTING: 103 MG/DL (ref 70–99)
HBA1C MFR BLD: 5.7 %
HCT VFR BLD CALC: 40.6 % (ref 40.5–52.5)
HCT VFR BLD CALC: 42 % (ref 40.5–52.5)
HCT VFR BLD CALC: 42.2 % (ref 40.5–52.5)
HCT VFR BLD CALC: 42.4 % (ref 40.5–52.5)
HCT VFR BLD CALC: 44.8 % (ref 40.5–52.5)
HDLC SERPL-MCNC: 42 MG/DL (ref 40–60)
HDLC SERPL-MCNC: 48 MG/DL (ref 40–60)
HEMOGLOBIN: 13.6 G/DL (ref 13.5–17.5)
HEMOGLOBIN: 13.9 G/DL (ref 13.5–17.5)
HEMOGLOBIN: 14 G/DL (ref 13.5–17.5)
HEMOGLOBIN: 14.2 G/DL (ref 13.5–17.5)
HEMOGLOBIN: 14.5 G/DL (ref 13.5–17.5)
INR BLD: 1.54 (ref 0.86–1.14)
KAPPA, FREE LIGHT CHAINS, SERUM: 38.95 MG/L (ref 3.3–19.4)
KAPPA/LAMBDA RATIO: 1.81 (ref 0.26–1.65)
KAPPA/LAMBDA TEST COMMENT: ABNORMAL
LAMBDA, FREE LIGHT CHAINS, SERUM: 21.56 MG/L (ref 5.71–26.3)
LDL CHOLESTEROL CALCULATED: 122 MG/DL
LDL CHOLESTEROL CALCULATED: 154 MG/DL
LV EF: 20 %
LV EF: 25 %
LVEF MODALITY: NORMAL
LVEF MODALITY: NORMAL
LYMPHOCYTES ABSOLUTE: 0.5 K/UL (ref 1–5.1)
LYMPHOCYTES ABSOLUTE: 0.7 K/UL (ref 1–5.1)
LYMPHOCYTES ABSOLUTE: 0.9 K/UL (ref 1–5.1)
LYMPHOCYTES RELATIVE PERCENT: 11.7 %
LYMPHOCYTES RELATIVE PERCENT: 12.1 %
LYMPHOCYTES RELATIVE PERCENT: 5.3 %
MAGNESIUM: 2.2 MG/DL (ref 1.8–2.4)
MAGNESIUM: 2.3 MG/DL (ref 1.8–2.4)
MAGNESIUM: 2.4 MG/DL (ref 1.8–2.4)
MAGNESIUM: 2.4 MG/DL (ref 1.8–2.4)
MCH RBC QN AUTO: 31.4 PG (ref 26–34)
MCH RBC QN AUTO: 31.8 PG (ref 26–34)
MCH RBC QN AUTO: 32.1 PG (ref 26–34)
MCH RBC QN AUTO: 32.3 PG (ref 26–34)
MCH RBC QN AUTO: 32.5 PG (ref 26–34)
MCHC RBC AUTO-ENTMCNC: 32.4 G/DL (ref 31–36)
MCHC RBC AUTO-ENTMCNC: 32.9 G/DL (ref 31–36)
MCHC RBC AUTO-ENTMCNC: 33 G/DL (ref 31–36)
MCHC RBC AUTO-ENTMCNC: 33.5 G/DL (ref 31–36)
MCHC RBC AUTO-ENTMCNC: 33.7 G/DL (ref 31–36)
MCV RBC AUTO: 95.2 FL (ref 80–100)
MCV RBC AUTO: 96.3 FL (ref 80–100)
MCV RBC AUTO: 96.4 FL (ref 80–100)
MCV RBC AUTO: 97.5 FL (ref 80–100)
MCV RBC AUTO: 98.3 FL (ref 80–100)
MONOCYTES ABSOLUTE: 0.6 K/UL (ref 0–1.3)
MONOCYTES ABSOLUTE: 0.6 K/UL (ref 0–1.3)
MONOCYTES ABSOLUTE: 0.8 K/UL (ref 0–1.3)
MONOCYTES RELATIVE PERCENT: 11.5 %
MONOCYTES RELATIVE PERCENT: 6.9 %
MONOCYTES RELATIVE PERCENT: 9.7 %
NEUTROPHILS ABSOLUTE: 4.7 K/UL (ref 1.7–7.7)
NEUTROPHILS ABSOLUTE: 5.3 K/UL (ref 1.7–7.7)
NEUTROPHILS ABSOLUTE: 7.5 K/UL (ref 1.7–7.7)
NEUTROPHILS RELATIVE PERCENT: 74.8 %
NEUTROPHILS RELATIVE PERCENT: 77.3 %
NEUTROPHILS RELATIVE PERCENT: 87.3 %
PDW BLD-RTO: 14.6 % (ref 12.4–15.4)
PDW BLD-RTO: 14.7 % (ref 12.4–15.4)
PDW BLD-RTO: 14.8 % (ref 12.4–15.4)
PDW BLD-RTO: 14.9 % (ref 12.4–15.4)
PDW BLD-RTO: 15 % (ref 12.4–15.4)
PHOSPHORUS: 3.6 MG/DL (ref 2.5–4.9)
PHOSPHORUS: 3.6 MG/DL (ref 2.5–4.9)
PLATELET # BLD: 170 K/UL (ref 135–450)
PLATELET # BLD: 174 K/UL (ref 135–450)
PLATELET # BLD: 175 K/UL (ref 135–450)
PLATELET # BLD: 186 K/UL (ref 135–450)
PLATELET # BLD: 194 K/UL (ref 135–450)
PMV BLD AUTO: 7.7 FL (ref 5–10.5)
PMV BLD AUTO: 8.1 FL (ref 5–10.5)
PMV BLD AUTO: 8.2 FL (ref 5–10.5)
PMV BLD AUTO: 8.2 FL (ref 5–10.5)
PMV BLD AUTO: 8.3 FL (ref 5–10.5)
POC ACT LR: 166 SEC
POTASSIUM SERPL-SCNC: 3.6 MMOL/L (ref 3.5–5.1)
POTASSIUM SERPL-SCNC: 3.6 MMOL/L (ref 3.5–5.1)
POTASSIUM SERPL-SCNC: 3.8 MMOL/L (ref 3.5–5.1)
POTASSIUM SERPL-SCNC: 4 MMOL/L (ref 3.5–5.1)
POTASSIUM SERPL-SCNC: 4.1 MMOL/L (ref 3.5–5.1)
POTASSIUM SERPL-SCNC: 4.1 MMOL/L (ref 3.5–5.1)
POTASSIUM SERPL-SCNC: 4.2 MMOL/L (ref 3.5–5.1)
POTASSIUM SERPL-SCNC: 4.3 MMOL/L (ref 3.5–5.1)
POTASSIUM SERPL-SCNC: 4.4 MMOL/L (ref 3.5–5.1)
POTASSIUM SERPL-SCNC: 4.6 MMOL/L (ref 3.5–5.1)
POTASSIUM SERPL-SCNC: 4.6 MMOL/L (ref 3.5–5.1)
POTASSIUM SERPL-SCNC: 4.7 MMOL/L (ref 3.5–5.1)
POTASSIUM SERPL-SCNC: 5.5 MMOL/L (ref 3.5–5.1)
PRO-BNP: 3774 PG/ML (ref 0–449)
PRO-BNP: 4067 PG/ML (ref 0–449)
PRO-BNP: 4419 PG/ML (ref 0–449)
PRO-BNP: 4474 PG/ML (ref 0–449)
PRO-BNP: 6222 PG/ML (ref 0–449)
PRO-BNP: 6289 PG/ML (ref 0–449)
PRO-BNP: 8359 PG/ML (ref 0–449)
PROTEIN PROTEIN: 0.01 G/DL
PROTEIN PROTEIN: 7 MG/DL
PROTHROMBIN TIME: 17.5 SEC (ref 9.8–13)
RBC # BLD: 4.21 M/UL (ref 4.2–5.9)
RBC # BLD: 4.35 M/UL (ref 4.2–5.9)
RBC # BLD: 4.36 M/UL (ref 4.2–5.9)
RBC # BLD: 4.43 M/UL (ref 4.2–5.9)
RBC # BLD: 4.55 M/UL (ref 4.2–5.9)
SODIUM BLD-SCNC: 135 MMOL/L (ref 136–145)
SODIUM BLD-SCNC: 136 MMOL/L (ref 136–145)
SODIUM BLD-SCNC: 137 MMOL/L (ref 136–145)
SODIUM BLD-SCNC: 138 MMOL/L (ref 136–145)
SODIUM BLD-SCNC: 139 MMOL/L (ref 136–145)
SODIUM BLD-SCNC: 140 MMOL/L (ref 136–145)
SODIUM BLD-SCNC: 141 MMOL/L (ref 136–145)
SODIUM BLD-SCNC: 142 MMOL/L (ref 136–145)
SODIUM BLD-SCNC: 142 MMOL/L (ref 136–145)
SODIUM BLD-SCNC: 143 MMOL/L (ref 136–145)
SODIUM BLD-SCNC: 144 MMOL/L (ref 136–145)
SPE/IFE INTERPRETATION: NORMAL
T4 FREE: 1.4 NG/DL (ref 0.9–1.8)
TOTAL PROTEIN: 6.6 G/DL (ref 6.4–8.2)
TOTAL PROTEIN: 6.7 G/DL (ref 6.4–8.2)
TOTAL PROTEIN: 7.2 G/DL (ref 6.4–8.2)
TOTAL PROTEIN: 7.3 G/DL (ref 6.4–8.2)
TRIGL SERPL-MCNC: 67 MG/DL (ref 0–150)
TRIGLYCERIDE, FASTING: 91 MG/DL (ref 0–150)
TROPONIN: 0.02 NG/ML
TROPONIN: <0.01 NG/ML
TSH SERPL DL<=0.05 MIU/L-ACNC: 2.77 UIU/ML (ref 0.27–4.2)
URINE ELECTROPHORESIS INTERP: NORMAL
VLDLC SERPL CALC-MCNC: 13 MG/DL
VLDLC SERPL CALC-MCNC: 18 MG/DL
WBC # BLD: 4.8 K/UL (ref 4–11)
WBC # BLD: 5.4 K/UL (ref 4–11)
WBC # BLD: 6.1 K/UL (ref 4–11)
WBC # BLD: 7.1 K/UL (ref 4–11)
WBC # BLD: 8.6 K/UL (ref 4–11)

## 2019-01-01 PROCEDURE — 80048 BASIC METABOLIC PNL TOTAL CA: CPT

## 2019-01-01 PROCEDURE — 84165 PROTEIN E-PHORESIS SERUM: CPT

## 2019-01-01 PROCEDURE — 6370000000 HC RX 637 (ALT 250 FOR IP): Performed by: INTERNAL MEDICINE

## 2019-01-01 PROCEDURE — 99223 1ST HOSP IP/OBS HIGH 75: CPT | Performed by: INTERNAL MEDICINE

## 2019-01-01 PROCEDURE — 93296 REM INTERROG EVL PM/IDS: CPT | Performed by: INTERNAL MEDICINE

## 2019-01-01 PROCEDURE — 93010 ELECTROCARDIOGRAM REPORT: CPT | Performed by: INTERNAL MEDICINE

## 2019-01-01 PROCEDURE — 93460 R&L HRT ART/VENTRICLE ANGIO: CPT

## 2019-01-01 PROCEDURE — 71260 CT THORAX DX C+: CPT

## 2019-01-01 PROCEDURE — 2580000003 HC RX 258: Performed by: INTERNAL MEDICINE

## 2019-01-01 PROCEDURE — 83880 ASSAY OF NATRIURETIC PEPTIDE: CPT

## 2019-01-01 PROCEDURE — 36415 COLL VENOUS BLD VENIPUNCTURE: CPT

## 2019-01-01 PROCEDURE — 84439 ASSAY OF FREE THYROXINE: CPT

## 2019-01-01 PROCEDURE — 6360000002 HC RX W HCPCS: Performed by: INTERNAL MEDICINE

## 2019-01-01 PROCEDURE — 85025 COMPLETE CBC W/AUTO DIFF WBC: CPT

## 2019-01-01 PROCEDURE — 2580000003 HC RX 258

## 2019-01-01 PROCEDURE — 93005 ELECTROCARDIOGRAM TRACING: CPT | Performed by: INTERNAL MEDICINE

## 2019-01-01 PROCEDURE — 6360000004 HC RX CONTRAST MEDICATION: Performed by: EMERGENCY MEDICINE

## 2019-01-01 PROCEDURE — 6360000004 HC RX CONTRAST MEDICATION: Performed by: INTERNAL MEDICINE

## 2019-01-01 PROCEDURE — 93017 CV STRESS TEST TRACING ONLY: CPT

## 2019-01-01 PROCEDURE — 80053 COMPREHEN METABOLIC PANEL: CPT

## 2019-01-01 PROCEDURE — 99214 OFFICE O/P EST MOD 30 MIN: CPT | Performed by: INTERNAL MEDICINE

## 2019-01-01 PROCEDURE — 93290 INTERROG DEV EVAL ICPMS IP: CPT | Performed by: NURSE PRACTITIONER

## 2019-01-01 PROCEDURE — 80069 RENAL FUNCTION PANEL: CPT

## 2019-01-01 PROCEDURE — 93572 IV DOP VEL&/PRESS C FLO EA: CPT

## 2019-01-01 PROCEDURE — 99153 MOD SED SAME PHYS/QHP EA: CPT

## 2019-01-01 PROCEDURE — G0378 HOSPITAL OBSERVATION PER HR: HCPCS

## 2019-01-01 PROCEDURE — 96376 TX/PRO/DX INJ SAME DRUG ADON: CPT

## 2019-01-01 PROCEDURE — C1760 CLOSURE DEV, VASC: HCPCS

## 2019-01-01 PROCEDURE — 99214 OFFICE O/P EST MOD 30 MIN: CPT | Performed by: NURSE PRACTITIONER

## 2019-01-01 PROCEDURE — 2720000010 HC SURG SUPPLY STERILE

## 2019-01-01 PROCEDURE — 93284 PRGRMG EVAL IMPLANTABLE DFB: CPT | Performed by: INTERNAL MEDICINE

## 2019-01-01 PROCEDURE — 93297 REM INTERROG DEV EVAL ICPMS: CPT | Performed by: INTERNAL MEDICINE

## 2019-01-01 PROCEDURE — 83735 ASSAY OF MAGNESIUM: CPT

## 2019-01-01 PROCEDURE — 83036 HEMOGLOBIN GLYCOSYLATED A1C: CPT

## 2019-01-01 PROCEDURE — 85027 COMPLETE CBC AUTOMATED: CPT

## 2019-01-01 PROCEDURE — 80061 LIPID PANEL: CPT

## 2019-01-01 PROCEDURE — 2700000000 HC OXYGEN THERAPY PER DAY

## 2019-01-01 PROCEDURE — 99232 SBSQ HOSP IP/OBS MODERATE 35: CPT | Performed by: NURSE PRACTITIONER

## 2019-01-01 PROCEDURE — 92960 CARDIOVERSION ELECTRIC EXT: CPT

## 2019-01-01 PROCEDURE — 96374 THER/PROPH/DIAG INJ IV PUSH: CPT

## 2019-01-01 PROCEDURE — 2060000000 HC ICU INTERMEDIATE R&B

## 2019-01-01 PROCEDURE — 93571 IV DOP VEL&/PRESS C FLO 1ST: CPT

## 2019-01-01 PROCEDURE — 6360000002 HC RX W HCPCS

## 2019-01-01 PROCEDURE — 2500000003 HC RX 250 WO HCPCS

## 2019-01-01 PROCEDURE — 85347 COAGULATION TIME ACTIVATED: CPT

## 2019-01-01 PROCEDURE — 93005 ELECTROCARDIOGRAM TRACING: CPT | Performed by: EMERGENCY MEDICINE

## 2019-01-01 PROCEDURE — 6370000000 HC RX 637 (ALT 250 FOR IP): Performed by: NURSE PRACTITIONER

## 2019-01-01 PROCEDURE — B41D1ZZ FLUOROSCOPY OF AORTA AND BILATERAL LOWER EXTREMITY ARTERIES USING LOW OSMOLAR CONTRAST: ICD-10-PCS | Performed by: INTERNAL MEDICINE

## 2019-01-01 PROCEDURE — 1200000000 HC SEMI PRIVATE

## 2019-01-01 PROCEDURE — 93571 IV DOP VEL&/PRESS C FLO 1ST: CPT | Performed by: INTERNAL MEDICINE

## 2019-01-01 PROCEDURE — 84484 ASSAY OF TROPONIN QUANT: CPT

## 2019-01-01 PROCEDURE — 85610 PROTHROMBIN TIME: CPT

## 2019-01-01 PROCEDURE — A9538 TC99M PYROPHOSPHATE: HCPCS | Performed by: NURSE PRACTITIONER

## 2019-01-01 PROCEDURE — 94640 AIRWAY INHALATION TREATMENT: CPT

## 2019-01-01 PROCEDURE — 71046 X-RAY EXAM CHEST 2 VIEWS: CPT

## 2019-01-01 PROCEDURE — 2709999900 HC NON-CHARGEABLE SUPPLY

## 2019-01-01 PROCEDURE — 78452 HT MUSCLE IMAGE SPECT MULT: CPT

## 2019-01-01 PROCEDURE — 71045 X-RAY EXAM CHEST 1 VIEW: CPT

## 2019-01-01 PROCEDURE — 93290 INTERROG DEV EVAL ICPMS IP: CPT | Performed by: INTERNAL MEDICINE

## 2019-01-01 PROCEDURE — 84155 ASSAY OF PROTEIN SERUM: CPT

## 2019-01-01 PROCEDURE — 83883 ASSAY NEPHELOMETRY NOT SPEC: CPT

## 2019-01-01 PROCEDURE — 6360000002 HC RX W HCPCS: Performed by: PHYSICIAN ASSISTANT

## 2019-01-01 PROCEDURE — 3430000000 HC RX DIAGNOSTIC RADIOPHARMACEUTICAL: Performed by: NURSE PRACTITIONER

## 2019-01-01 PROCEDURE — 93000 ELECTROCARDIOGRAM COMPLETE: CPT | Performed by: INTERNAL MEDICINE

## 2019-01-01 PROCEDURE — C1894 INTRO/SHEATH, NON-LASER: HCPCS

## 2019-01-01 PROCEDURE — 93460 R&L HRT ART/VENTRICLE ANGIO: CPT | Performed by: INTERNAL MEDICINE

## 2019-01-01 PROCEDURE — C1769 GUIDE WIRE: HCPCS

## 2019-01-01 PROCEDURE — 4A023N7 MEASUREMENT OF CARDIAC SAMPLING AND PRESSURE, LEFT HEART, PERCUTANEOUS APPROACH: ICD-10-PCS | Performed by: INTERNAL MEDICINE

## 2019-01-01 PROCEDURE — C1887 CATHETER, GUIDING: HCPCS

## 2019-01-01 PROCEDURE — 99285 EMERGENCY DEPT VISIT HI MDM: CPT

## 2019-01-01 PROCEDURE — 99223 1ST HOSP IP/OBS HIGH 75: CPT | Performed by: PHYSICIAN ASSISTANT

## 2019-01-01 PROCEDURE — 92960 CARDIOVERSION ELECTRIC EXT: CPT | Performed by: INTERNAL MEDICINE

## 2019-01-01 PROCEDURE — 93572 IV DOP VEL&/PRESS C FLO EA: CPT | Performed by: INTERNAL MEDICINE

## 2019-01-01 PROCEDURE — C8929 TTE W OR WO FOL WCON,DOPPLER: HCPCS

## 2019-01-01 PROCEDURE — 93295 DEV INTERROG REMOTE 1/2/MLT: CPT | Performed by: INTERNAL MEDICINE

## 2019-01-01 PROCEDURE — 6360000002 HC RX W HCPCS: Performed by: NURSE PRACTITIONER

## 2019-01-01 PROCEDURE — A9502 TC99M TETROFOSMIN: HCPCS | Performed by: INTERNAL MEDICINE

## 2019-01-01 PROCEDURE — B2111ZZ FLUOROSCOPY OF MULTIPLE CORONARY ARTERIES USING LOW OSMOLAR CONTRAST: ICD-10-PCS | Performed by: INTERNAL MEDICINE

## 2019-01-01 PROCEDURE — 78803 RP LOCLZJ TUM SPECT 1 AREA: CPT

## 2019-01-01 PROCEDURE — 3430000000 HC RX DIAGNOSTIC RADIOPHARMACEUTICAL: Performed by: INTERNAL MEDICINE

## 2019-01-01 PROCEDURE — 99232 SBSQ HOSP IP/OBS MODERATE 35: CPT | Performed by: INTERNAL MEDICINE

## 2019-01-01 PROCEDURE — 6360000004 HC RX CONTRAST MEDICATION

## 2019-01-01 PROCEDURE — 84443 ASSAY THYROID STIM HORMONE: CPT

## 2019-01-01 PROCEDURE — 84166 PROTEIN E-PHORESIS/URINE/CSF: CPT

## 2019-01-01 PROCEDURE — 99152 MOD SED SAME PHYS/QHP 5/>YRS: CPT

## 2019-01-01 PROCEDURE — 4A033BC MEASUREMENT OF ARTERIAL PRESSURE, CORONARY, PERCUTANEOUS APPROACH: ICD-10-PCS | Performed by: INTERNAL MEDICINE

## 2019-01-01 PROCEDURE — 99204 OFFICE O/P NEW MOD 45 MIN: CPT | Performed by: INTERNAL MEDICINE

## 2019-01-01 PROCEDURE — 94761 N-INVAS EAR/PLS OXIMETRY MLT: CPT

## 2019-01-01 PROCEDURE — 99233 SBSQ HOSP IP/OBS HIGH 50: CPT | Performed by: INTERNAL MEDICINE

## 2019-01-01 PROCEDURE — 84156 ASSAY OF PROTEIN URINE: CPT

## 2019-01-01 PROCEDURE — 93000 ELECTROCARDIOGRAM COMPLETE: CPT | Performed by: NURSE PRACTITIONER

## 2019-01-01 PROCEDURE — B2151ZZ FLUOROSCOPY OF LEFT HEART USING LOW OSMOLAR CONTRAST: ICD-10-PCS | Performed by: INTERNAL MEDICINE

## 2019-01-01 RX ORDER — M-VIT,TX,IRON,MINS/CALC/FOLIC 27MG-0.4MG
1 TABLET ORAL DAILY
Status: DISCONTINUED | OUTPATIENT
Start: 2019-01-01 | End: 2019-01-01 | Stop reason: HOSPADM

## 2019-01-01 RX ORDER — SODIUM CHLORIDE 0.9 % (FLUSH) 0.9 %
10 SYRINGE (ML) INJECTION EVERY 12 HOURS SCHEDULED
Status: DISCONTINUED | OUTPATIENT
Start: 2019-01-01 | End: 2019-01-01 | Stop reason: HOSPADM

## 2019-01-01 RX ORDER — ACETAMINOPHEN 325 MG/1
650 TABLET ORAL EVERY 4 HOURS PRN
Status: DISCONTINUED | OUTPATIENT
Start: 2019-01-01 | End: 2019-01-01 | Stop reason: HOSPADM

## 2019-01-01 RX ORDER — FUROSEMIDE 10 MG/ML
40 INJECTION INTRAMUSCULAR; INTRAVENOUS DAILY
Status: DISCONTINUED | OUTPATIENT
Start: 2019-01-01 | End: 2019-01-01

## 2019-01-01 RX ORDER — MIDAZOLAM HYDROCHLORIDE 5 MG/ML
INJECTION INTRAMUSCULAR; INTRAVENOUS
Status: COMPLETED | OUTPATIENT
Start: 2019-01-01 | End: 2019-01-01

## 2019-01-01 RX ORDER — FENTANYL CITRATE 50 UG/ML
INJECTION, SOLUTION INTRAMUSCULAR; INTRAVENOUS
Status: COMPLETED | OUTPATIENT
Start: 2019-01-01 | End: 2019-01-01

## 2019-01-01 RX ORDER — FUROSEMIDE 10 MG/ML
20 INJECTION INTRAMUSCULAR; INTRAVENOUS ONCE
Status: COMPLETED | OUTPATIENT
Start: 2019-01-01 | End: 2019-01-01

## 2019-01-01 RX ORDER — FUROSEMIDE 10 MG/ML
40 INJECTION INTRAMUSCULAR; INTRAVENOUS 2 TIMES DAILY
Status: DISCONTINUED | OUTPATIENT
Start: 2019-01-01 | End: 2019-01-01

## 2019-01-01 RX ORDER — FUROSEMIDE 20 MG/1
20 TABLET ORAL DAILY
Status: DISCONTINUED | OUTPATIENT
Start: 2019-01-01 | End: 2019-01-01 | Stop reason: HOSPADM

## 2019-01-01 RX ORDER — FUROSEMIDE 10 MG/ML
40 INJECTION INTRAMUSCULAR; INTRAVENOUS ONCE
Status: COMPLETED | OUTPATIENT
Start: 2019-01-01 | End: 2019-01-01

## 2019-01-01 RX ORDER — AMIODARONE HYDROCHLORIDE 200 MG/1
200 TABLET ORAL DAILY
Status: DISCONTINUED | OUTPATIENT
Start: 2019-01-01 | End: 2019-01-01 | Stop reason: HOSPADM

## 2019-01-01 RX ORDER — ONDANSETRON 2 MG/ML
4 INJECTION INTRAMUSCULAR; INTRAVENOUS EVERY 6 HOURS PRN
Status: DISCONTINUED | OUTPATIENT
Start: 2019-01-01 | End: 2019-01-01 | Stop reason: HOSPADM

## 2019-01-01 RX ORDER — MIDAZOLAM HYDROCHLORIDE 1 MG/ML
0.5 INJECTION INTRAMUSCULAR; INTRAVENOUS ONCE
Status: COMPLETED | OUTPATIENT
Start: 2019-01-01 | End: 2019-01-01

## 2019-01-01 RX ORDER — FUROSEMIDE 40 MG/1
40 TABLET ORAL DAILY
Status: DISCONTINUED | OUTPATIENT
Start: 2019-01-01 | End: 2019-01-01

## 2019-01-01 RX ORDER — ASPIRIN 81 MG/1
81 TABLET ORAL DAILY
Status: DISCONTINUED | OUTPATIENT
Start: 2019-01-01 | End: 2019-01-01 | Stop reason: HOSPADM

## 2019-01-01 RX ORDER — POTASSIUM CHLORIDE 1500 MG/1
TABLET, EXTENDED RELEASE ORAL
Qty: 180 TABLET | Refills: 1 | Status: SHIPPED | OUTPATIENT
Start: 2019-01-01 | End: 2019-01-01 | Stop reason: ALTCHOICE

## 2019-01-01 RX ORDER — AMIODARONE HYDROCHLORIDE 200 MG/1
200 TABLET ORAL 2 TIMES DAILY
Status: DISCONTINUED | OUTPATIENT
Start: 2019-01-01 | End: 2019-01-01 | Stop reason: HOSPADM

## 2019-01-01 RX ORDER — SODIUM CHLORIDE 0.9 % (FLUSH) 0.9 %
10 SYRINGE (ML) INJECTION PRN
Status: DISCONTINUED | OUTPATIENT
Start: 2019-01-01 | End: 2019-01-01 | Stop reason: HOSPADM

## 2019-01-01 RX ORDER — POTASSIUM CHLORIDE 750 MG/1
10 TABLET, EXTENDED RELEASE ORAL ONCE
Status: COMPLETED | OUTPATIENT
Start: 2019-01-01 | End: 2019-01-01

## 2019-01-01 RX ORDER — POTASSIUM CHLORIDE 20 MEQ/1
20 TABLET, EXTENDED RELEASE ORAL 2 TIMES DAILY WITH MEALS
Status: DISCONTINUED | OUTPATIENT
Start: 2019-01-01 | End: 2019-01-01 | Stop reason: HOSPADM

## 2019-01-01 RX ORDER — AMIODARONE HYDROCHLORIDE 200 MG/1
TABLET ORAL
Qty: 90 TABLET | Refills: 1 | Status: SHIPPED | OUTPATIENT
Start: 2019-01-01 | End: 2020-01-01 | Stop reason: ALTCHOICE

## 2019-01-01 RX ORDER — FUROSEMIDE 20 MG/1
20 TABLET ORAL DAILY
Qty: 30 TABLET | Refills: 2 | Status: SHIPPED | OUTPATIENT
Start: 2019-01-01 | End: 2019-01-01

## 2019-01-01 RX ORDER — FUROSEMIDE 40 MG/1
40 TABLET ORAL DAILY
Status: DISCONTINUED | OUTPATIENT
Start: 2019-01-01 | End: 2019-01-01 | Stop reason: HOSPADM

## 2019-01-01 RX ORDER — IPRATROPIUM BROMIDE AND ALBUTEROL SULFATE 2.5; .5 MG/3ML; MG/3ML
1 SOLUTION RESPIRATORY (INHALATION) ONCE
Status: COMPLETED | OUTPATIENT
Start: 2019-01-01 | End: 2019-01-01

## 2019-01-01 RX ORDER — FUROSEMIDE 20 MG/1
20 TABLET ORAL EVERY OTHER DAY
Qty: 30 TABLET | Refills: 2
Start: 2019-01-01 | End: 2019-01-01 | Stop reason: SINTOL

## 2019-01-01 RX ORDER — SODIUM CHLORIDE 9 MG/ML
INJECTION, SOLUTION INTRAVENOUS CONTINUOUS
Status: DISCONTINUED | OUTPATIENT
Start: 2019-01-01 | End: 2019-01-01 | Stop reason: HOSPADM

## 2019-01-01 RX ORDER — FUROSEMIDE 20 MG/1
20 TABLET ORAL EVERY OTHER DAY
Qty: 90 TABLET | Refills: 3 | Status: ON HOLD | OUTPATIENT
Start: 2019-01-01 | End: 2019-01-01 | Stop reason: HOSPADM

## 2019-01-01 RX ORDER — ASPIRIN 81 MG/1
81 TABLET ORAL DAILY
Status: ON HOLD | COMMUNITY
End: 2020-01-01 | Stop reason: HOSPADM

## 2019-01-01 RX ORDER — SPIRONOLACTONE 25 MG/1
25 TABLET ORAL EVERY OTHER DAY
Qty: 45 TABLET | Refills: 1 | Status: SHIPPED | OUTPATIENT
Start: 2019-01-01 | End: 2020-01-01

## 2019-01-01 RX ORDER — AMIODARONE HYDROCHLORIDE 200 MG/1
100 TABLET ORAL EVERY OTHER DAY
Qty: 15 TABLET | Refills: 3
Start: 2019-01-01 | End: 2019-01-01

## 2019-01-01 RX ORDER — FAMOTIDINE 20 MG/1
20 TABLET, FILM COATED ORAL DAILY
Status: DISCONTINUED | OUTPATIENT
Start: 2019-01-01 | End: 2019-01-01 | Stop reason: HOSPADM

## 2019-01-01 RX ORDER — HEPARIN SODIUM 1000 [USP'U]/ML
INJECTION, SOLUTION INTRAVENOUS; SUBCUTANEOUS
Status: COMPLETED | OUTPATIENT
Start: 2019-01-01 | End: 2019-01-01

## 2019-01-01 RX ADMIN — MIDAZOLAM HYDROCHLORIDE 1 MG: 5 INJECTION INTRAMUSCULAR; INTRAVENOUS at 14:15

## 2019-01-01 RX ADMIN — APIXABAN 5 MG: 5 TABLET, FILM COATED ORAL at 15:06

## 2019-01-01 RX ADMIN — SODIUM CHLORIDE, PRESERVATIVE FREE 10 ML: 5 INJECTION INTRAVENOUS at 08:28

## 2019-01-01 RX ADMIN — MIDAZOLAM HYDROCHLORIDE 0.5 MG: 1 INJECTION INTRAMUSCULAR; INTRAVENOUS at 08:16

## 2019-01-01 RX ADMIN — ASPIRIN 81 MG: 81 TABLET, COATED ORAL at 12:34

## 2019-01-01 RX ADMIN — MIDAZOLAM HYDROCHLORIDE 0.5 MG: 5 INJECTION INTRAMUSCULAR; INTRAVENOUS at 13:28

## 2019-01-01 RX ADMIN — AMIODARONE HYDROCHLORIDE 200 MG: 200 TABLET ORAL at 12:32

## 2019-01-01 RX ADMIN — SODIUM CHLORIDE, PRESERVATIVE FREE 10 ML: 5 INJECTION INTRAVENOUS at 21:30

## 2019-01-01 RX ADMIN — FAMOTIDINE 20 MG: 20 TABLET ORAL at 12:34

## 2019-01-01 RX ADMIN — REGADENOSON 0.4 MG: 0.08 INJECTION, SOLUTION INTRAVENOUS at 09:18

## 2019-01-01 RX ADMIN — SODIUM CHLORIDE: 9 INJECTION, SOLUTION INTRAVENOUS at 08:16

## 2019-01-01 RX ADMIN — TETROFOSMIN 11 MILLICURIE: 1.38 INJECTION, POWDER, LYOPHILIZED, FOR SOLUTION INTRAVENOUS at 07:54

## 2019-01-01 RX ADMIN — Medication 1 TABLET: at 12:32

## 2019-01-01 RX ADMIN — FENTANYL CITRATE 25 MCG: 50 INJECTION, SOLUTION INTRAMUSCULAR; INTRAVENOUS at 13:55

## 2019-01-01 RX ADMIN — IPRATROPIUM BROMIDE AND ALBUTEROL SULFATE 1 AMPULE: .5; 3 SOLUTION RESPIRATORY (INHALATION) at 09:05

## 2019-01-01 RX ADMIN — SODIUM CHLORIDE, PRESERVATIVE FREE 10 ML: 5 INJECTION INTRAVENOUS at 21:11

## 2019-01-01 RX ADMIN — FAMOTIDINE 20 MG: 20 TABLET ORAL at 08:28

## 2019-01-01 RX ADMIN — AMIODARONE HYDROCHLORIDE 200 MG: 200 TABLET ORAL at 09:22

## 2019-01-01 RX ADMIN — FUROSEMIDE 40 MG: 40 TABLET ORAL at 10:33

## 2019-01-01 RX ADMIN — SODIUM CHLORIDE, PRESERVATIVE FREE 10 ML: 5 INJECTION INTRAVENOUS at 09:23

## 2019-01-01 RX ADMIN — FENTANYL CITRATE 12.5 MCG: 50 INJECTION, SOLUTION INTRAMUSCULAR; INTRAVENOUS at 13:28

## 2019-01-01 RX ADMIN — FENTANYL CITRATE 25 MCG: 50 INJECTION, SOLUTION INTRAMUSCULAR; INTRAVENOUS at 13:12

## 2019-01-01 RX ADMIN — APIXABAN 5 MG: 5 TABLET, FILM COATED ORAL at 12:33

## 2019-01-01 RX ADMIN — MIDAZOLAM HYDROCHLORIDE 1 MG: 5 INJECTION INTRAMUSCULAR; INTRAVENOUS at 14:33

## 2019-01-01 RX ADMIN — FUROSEMIDE 40 MG: 10 INJECTION, SOLUTION INTRAMUSCULAR; INTRAVENOUS at 10:34

## 2019-01-01 RX ADMIN — FENTANYL CITRATE 25 MCG: 50 INJECTION, SOLUTION INTRAMUSCULAR; INTRAVENOUS at 14:15

## 2019-01-01 RX ADMIN — ASPIRIN 81 MG: 81 TABLET, COATED ORAL at 09:22

## 2019-01-01 RX ADMIN — FUROSEMIDE 20 MG: 10 INJECTION, SOLUTION INTRAMUSCULAR; INTRAVENOUS at 12:44

## 2019-01-01 RX ADMIN — ASPIRIN 81 MG: 81 TABLET, COATED ORAL at 12:43

## 2019-01-01 RX ADMIN — Medication 15 MILLICURIE: at 11:50

## 2019-01-01 RX ADMIN — Medication 10 ML: at 10:34

## 2019-01-01 RX ADMIN — Medication 10 ML: at 12:44

## 2019-01-01 RX ADMIN — AMIODARONE HYDROCHLORIDE 200 MG: 200 TABLET ORAL at 21:15

## 2019-01-01 RX ADMIN — Medication 1 TABLET: at 08:28

## 2019-01-01 RX ADMIN — FENTANYL CITRATE 25 MCG: 50 INJECTION, SOLUTION INTRAMUSCULAR; INTRAVENOUS at 14:33

## 2019-01-01 RX ADMIN — POTASSIUM CHLORIDE 20 MEQ: 20 TABLET, EXTENDED RELEASE ORAL at 09:22

## 2019-01-01 RX ADMIN — AMIODARONE HYDROCHLORIDE 200 MG: 200 TABLET ORAL at 21:10

## 2019-01-01 RX ADMIN — FENTANYL CITRATE 12.5 MCG: 50 INJECTION, SOLUTION INTRAMUSCULAR; INTRAVENOUS at 14:42

## 2019-01-01 RX ADMIN — FUROSEMIDE 20 MG: 10 INJECTION, SOLUTION INTRAMUSCULAR; INTRAVENOUS at 19:12

## 2019-01-01 RX ADMIN — APIXABAN 5 MG: 5 TABLET, FILM COATED ORAL at 21:15

## 2019-01-01 RX ADMIN — PERFLUTREN 2.2 MG: 6.52 INJECTION, SUSPENSION INTRAVENOUS at 08:37

## 2019-01-01 RX ADMIN — FUROSEMIDE 40 MG: 40 TABLET ORAL at 08:28

## 2019-01-01 RX ADMIN — TETROFOSMIN 32.8 MILLICURIE: 1.38 INJECTION, POWDER, LYOPHILIZED, FOR SOLUTION INTRAVENOUS at 09:19

## 2019-01-01 RX ADMIN — MIDAZOLAM HYDROCHLORIDE 0.5 MG: 5 INJECTION INTRAMUSCULAR; INTRAVENOUS at 13:56

## 2019-01-01 RX ADMIN — POTASSIUM CHLORIDE 20 MEQ: 20 TABLET, EXTENDED RELEASE ORAL at 10:33

## 2019-01-01 RX ADMIN — ASPIRIN 81 MG: 81 TABLET, COATED ORAL at 08:28

## 2019-01-01 RX ADMIN — FENTANYL CITRATE 12.5 MCG: 50 INJECTION, SOLUTION INTRAMUSCULAR; INTRAVENOUS at 13:59

## 2019-01-01 RX ADMIN — APIXABAN 2.5 MG: 5 TABLET, FILM COATED ORAL at 10:33

## 2019-01-01 RX ADMIN — HEPARIN SODIUM 4000 UNITS: 1000 INJECTION, SOLUTION INTRAVENOUS; SUBCUTANEOUS at 14:00

## 2019-01-01 RX ADMIN — APIXABAN 2.5 MG: 5 TABLET, FILM COATED ORAL at 20:02

## 2019-01-01 RX ADMIN — IOPAMIDOL 75 ML: 755 INJECTION, SOLUTION INTRAVENOUS at 10:01

## 2019-01-01 RX ADMIN — SODIUM CHLORIDE, PRESERVATIVE FREE 10 ML: 5 INJECTION INTRAVENOUS at 12:34

## 2019-01-01 RX ADMIN — POTASSIUM CHLORIDE 20 MEQ: 20 TABLET, EXTENDED RELEASE ORAL at 16:58

## 2019-01-01 RX ADMIN — AMIODARONE HYDROCHLORIDE 200 MG: 200 TABLET ORAL at 10:32

## 2019-01-01 RX ADMIN — ASPIRIN 81 MG: 81 TABLET, COATED ORAL at 10:33

## 2019-01-01 RX ADMIN — MIDAZOLAM HYDROCHLORIDE 1 MG: 5 INJECTION INTRAMUSCULAR; INTRAVENOUS at 13:12

## 2019-01-01 RX ADMIN — FUROSEMIDE 40 MG: 10 INJECTION, SOLUTION INTRAMUSCULAR; INTRAVENOUS at 12:35

## 2019-01-01 RX ADMIN — FAMOTIDINE 20 MG: 20 TABLET ORAL at 09:22

## 2019-01-01 RX ADMIN — AMIODARONE HYDROCHLORIDE 200 MG: 200 TABLET ORAL at 21:29

## 2019-01-01 RX ADMIN — AMIODARONE HYDROCHLORIDE 200 MG: 200 TABLET ORAL at 12:43

## 2019-01-01 RX ADMIN — AMIODARONE HYDROCHLORIDE 200 MG: 200 TABLET ORAL at 08:28

## 2019-01-01 RX ADMIN — POTASSIUM CHLORIDE 20 MEQ: 20 TABLET, EXTENDED RELEASE ORAL at 12:32

## 2019-01-01 RX ADMIN — APIXABAN 2.5 MG: 5 TABLET, FILM COATED ORAL at 12:43

## 2019-01-01 RX ADMIN — Medication 10 ML: at 20:02

## 2019-01-01 RX ADMIN — POTASSIUM CHLORIDE 10 MEQ: 750 TABLET, EXTENDED RELEASE ORAL at 21:12

## 2019-01-01 RX ADMIN — SODIUM CHLORIDE, PRESERVATIVE FREE 10 ML: 5 INJECTION INTRAVENOUS at 21:15

## 2019-01-01 RX ADMIN — Medication 1 TABLET: at 09:22

## 2019-01-01 RX ADMIN — POTASSIUM CHLORIDE 20 MEQ: 20 TABLET, EXTENDED RELEASE ORAL at 08:28

## 2019-01-01 ASSESSMENT — ENCOUNTER SYMPTOMS
SORE THROAT: 0
SHORTNESS OF BREATH: 1
CHEST TIGHTNESS: 0
SHORTNESS OF BREATH: 1
COUGH: 0
RHINORRHEA: 0
SHORTNESS OF BREATH: 1
ABDOMINAL PAIN: 0
COUGH: 0
VOMITING: 0
WHEEZING: 0
BACK PAIN: 0
ABDOMINAL PAIN: 0
DIARRHEA: 0
BLOOD IN STOOL: 0
SHORTNESS OF BREATH: 1
SHORTNESS OF BREATH: 1
DIARRHEA: 1
SHORTNESS OF BREATH: 1
NAUSEA: 0
DIARRHEA: 0
SHORTNESS OF BREATH: 0
NAUSEA: 0
COUGH: 1
EYE DISCHARGE: 0
SORE THROAT: 0
TROUBLE SWALLOWING: 0
COUGH: 0
VOMITING: 0
COUGH: 1

## 2019-01-01 ASSESSMENT — PAIN SCALES - GENERAL
PAINLEVEL_OUTOF10: 0

## 2019-01-08 ENCOUNTER — NURSE ONLY (OUTPATIENT)
Dept: CARDIOLOGY CLINIC | Age: 84
End: 2019-01-08
Payer: MEDICARE

## 2019-01-08 DIAGNOSIS — I50.22 CHRONIC SYSTOLIC CONGESTIVE HEART FAILURE (HCC): ICD-10-CM

## 2019-01-08 DIAGNOSIS — I42.9 CARDIOMYOPATHY (HCC): ICD-10-CM

## 2019-01-08 DIAGNOSIS — I42.8 NON-ISCHEMIC CARDIOMYOPATHY (HCC): ICD-10-CM

## 2019-01-08 DIAGNOSIS — Z95.810 BIVENTRICULAR ICD (IMPLANTABLE CARDIOVERTER-DEFIBRILLATOR) IN PLACE: Primary | ICD-10-CM

## 2019-01-08 DIAGNOSIS — I25.5 ISCHEMIC CARDIOMYOPATHY: ICD-10-CM

## 2019-01-08 DIAGNOSIS — I47.29 NSVT (NONSUSTAINED VENTRICULAR TACHYCARDIA): ICD-10-CM

## 2019-01-08 PROCEDURE — 93297 REM INTERROG DEV EVAL ICPMS: CPT | Performed by: INTERNAL MEDICINE

## 2019-01-08 PROCEDURE — 93296 REM INTERROG EVL PM/IDS: CPT | Performed by: INTERNAL MEDICINE

## 2019-01-08 PROCEDURE — 93295 DEV INTERROG REMOTE 1/2/MLT: CPT | Performed by: INTERNAL MEDICINE

## 2019-02-04 RX ORDER — FLUDROCORTISONE ACETATE 0.1 MG/1
TABLET ORAL
Qty: 180 TABLET | Refills: 0 | Status: SHIPPED | OUTPATIENT
Start: 2019-02-04 | End: 2019-01-01 | Stop reason: CLARIF

## 2019-02-20 ENCOUNTER — TELEPHONE (OUTPATIENT)
Dept: CARDIOLOGY CLINIC | Age: 84
End: 2019-02-20

## 2019-02-20 RX ORDER — MIDODRINE HYDROCHLORIDE 2.5 MG/1
2.5 TABLET ORAL 3 TIMES DAILY
Qty: 90 TABLET | Refills: 0 | Status: SHIPPED | OUTPATIENT
Start: 2019-02-20 | End: 2019-03-21 | Stop reason: SDUPTHER

## 2019-03-06 ENCOUNTER — OFFICE VISIT (OUTPATIENT)
Dept: CARDIOLOGY CLINIC | Age: 84
End: 2019-03-06
Payer: MEDICARE

## 2019-03-06 ENCOUNTER — HOSPITAL ENCOUNTER (OUTPATIENT)
Age: 84
Discharge: HOME | End: 2019-03-06
Payer: MEDICARE

## 2019-03-06 ENCOUNTER — PROCEDURE VISIT (OUTPATIENT)
Dept: CARDIOLOGY CLINIC | Age: 84
End: 2019-03-06
Payer: MEDICARE

## 2019-03-06 VITALS
HEART RATE: 65 BPM | DIASTOLIC BLOOD PRESSURE: 60 MMHG | BODY MASS INDEX: 25.31 KG/M2 | OXYGEN SATURATION: 94 % | WEIGHT: 191 LBS | HEIGHT: 73 IN | SYSTOLIC BLOOD PRESSURE: 98 MMHG

## 2019-03-06 DIAGNOSIS — Z78.9 STATIN INTOLERANCE: ICD-10-CM

## 2019-03-06 DIAGNOSIS — I48.0 PAROXYSMAL ATRIAL FIBRILLATION (HCC): ICD-10-CM

## 2019-03-06 DIAGNOSIS — Z95.810 BIVENTRICULAR ICD (IMPLANTABLE CARDIOVERTER-DEFIBRILLATOR) IN PLACE: ICD-10-CM

## 2019-03-06 DIAGNOSIS — E78.00 HYPERCHOLESTEROLEMIA: ICD-10-CM

## 2019-03-06 DIAGNOSIS — R42 DIZZINESS: ICD-10-CM

## 2019-03-06 DIAGNOSIS — I50.22 CHRONIC SYSTOLIC CONGESTIVE HEART FAILURE (HCC): ICD-10-CM

## 2019-03-06 DIAGNOSIS — I42.8 NON-ISCHEMIC CARDIOMYOPATHY (HCC): ICD-10-CM

## 2019-03-06 DIAGNOSIS — I25.10 CORONARY ARTERY DISEASE INVOLVING NATIVE CORONARY ARTERY OF NATIVE HEART WITHOUT ANGINA PECTORIS: Primary | ICD-10-CM

## 2019-03-06 LAB
ALBUMIN SERPL-MCNC: 4.5 G/DL (ref 3.4–5)
ANION GAP SERPL CALCULATED.3IONS-SCNC: 15 MMOL/L (ref 3–16)
BUN BLDV-MCNC: 30 MG/DL (ref 7–20)
CALCIUM SERPL-MCNC: 9.1 MG/DL (ref 8.3–10.6)
CHLORIDE BLD-SCNC: 103 MMOL/L (ref 99–110)
CO2: 23 MMOL/L (ref 21–32)
CREAT SERPL-MCNC: 1.3 MG/DL (ref 0.8–1.3)
GFR AFRICAN AMERICAN: >60
GFR NON-AFRICAN AMERICAN: 52
GLUCOSE BLD-MCNC: 112 MG/DL (ref 70–99)
PHOSPHORUS: 3.8 MG/DL (ref 2.5–4.9)
POTASSIUM SERPL-SCNC: 4.4 MMOL/L (ref 3.5–5.1)
SODIUM BLD-SCNC: 141 MMOL/L (ref 136–145)

## 2019-03-06 PROCEDURE — 36415 COLL VENOUS BLD VENIPUNCTURE: CPT

## 2019-03-06 PROCEDURE — 80069 RENAL FUNCTION PANEL: CPT

## 2019-03-06 PROCEDURE — 99215 OFFICE O/P EST HI 40 MIN: CPT | Performed by: INTERNAL MEDICINE

## 2019-03-06 PROCEDURE — 93290 INTERROG DEV EVAL ICPMS IP: CPT | Performed by: INTERNAL MEDICINE

## 2019-03-11 ENCOUNTER — TELEPHONE (OUTPATIENT)
Dept: CARDIOLOGY CLINIC | Age: 84
End: 2019-03-11

## 2019-03-18 RX ORDER — MIDODRINE HYDROCHLORIDE 2.5 MG/1
2.5 TABLET ORAL 3 TIMES DAILY
Qty: 90 TABLET | Refills: 0 | OUTPATIENT
Start: 2019-03-18

## 2019-03-21 RX ORDER — MIDODRINE HYDROCHLORIDE 2.5 MG/1
TABLET ORAL
Qty: 90 TABLET | Refills: 1 | Status: SHIPPED | OUTPATIENT
Start: 2019-03-21 | End: 2019-01-01

## 2019-03-22 RX ORDER — AMIODARONE HYDROCHLORIDE 200 MG/1
TABLET ORAL
Qty: 30 TABLET | Refills: 3 | Status: SHIPPED | OUTPATIENT
Start: 2019-03-22 | End: 2019-01-01 | Stop reason: SDUPTHER

## 2019-04-02 RX ORDER — APIXABAN 5 MG/1
TABLET, FILM COATED ORAL
Qty: 60 TABLET | Refills: 11 | Status: ON HOLD | OUTPATIENT
Start: 2019-04-02 | End: 2020-01-01 | Stop reason: HOSPADM

## 2019-04-09 NOTE — LETTER
[address] [facility] [phone]  Luige Daniel [address] [facility] [phone]    Pacemaker/Defibrillator Clinic          04/10/19        [address] [facility]  [address] [address]        Dear Bo Gloria    This letter is to inform you that we received the transmission from your monitor at home that checks your pacemaker and/or defibrillator, or implanted heart monitor. The next date your monitor will automatically transmit will be 9/10/19. Please do not send additional routine transmissions unless specifically requested. Your device and monitor are wireless and most transmit cellularly, but please periodically check your monitor is still plugged in to the electrical outlet. If you still use the telephone land line to send please ensure the connection to the phone jose alejandro is secure. This will help to ensure successful automatic transmissions in the future. Also, the monitor needs to be close to you while sleeping at night. Please be aware that the remote device transmission sites are periodically monitored only during regular business hours during which simultaneous in-office device clinics are being run. If your transmission requires attention, we will contact you as soon as possible. Thank you. We will check your device in office when you see Dr. Miguel Ivy and Joshua on 6/3/19.       Mariya

## 2019-04-10 NOTE — PROGRESS NOTES
Carelink transmission shows normal sensing and pacing function. See interrogation for more details. Thoracic impedance trend stable. BIVP 94.4%  Hx PAF-on anticoag and amio. No AF recorded.   Episodes Since: 06-Mar-2019  2 Non-sustained VT-EGMs ? V-SVT vs NSVT-both < 5 sec  OV JMB and NPDD 6-3-19

## 2019-05-29 NOTE — TELEPHONE ENCOUNTER
SANDRA Pt last office visit 9/24/2018  Plan:  1. Catheter ablation for recurrent tachycardia, presumed SVT. 2. Follow up after procedure      Seen JESSIE 3/6/2019  Recommendation:  1. Unable to start BB,ACE/ARB due to hypotension (failed BB and CBB in past)  2. Limited echo  3. iIterogation shows normal optivals and no CHF  4. Negative orthostatics, +very symptomatic              - suspect vertigo rather the orthostatic hypotension              - d/w pt to d/w PCP  5. Refer back to Dr. Ernesto Borrego for NSVT, amio and afib. 6. Update renal for K given daily supplemntation  7.  Update limited echo for interval LVEF on BiV tx  - I will have him see one of the NPs in three months and I will see him in six months.        NPDD appt 6/3/2019    Labs  Cmp,tsh, 4/25/19

## 2019-06-03 NOTE — PATIENT INSTRUCTIONS
1. Take one half of a tab of amiodarone every other day (100 mg every other)    2. Stay hydrated by drinking water frequently. Try to drink at least one liter of water per day in addition to your coffee. Try to only drink 2 cups of coffee daily    3.  Follow up in 6 months

## 2019-06-03 NOTE — LETTER
[facility]   Cardiac follow up       HPI:  Kathy Bryant is a 80 y.o. male who presents for follow up of non ischemic cardiomyopathy and arrhythmia. His wife stated he had an irregular rhythm for years. However the EKGs available showed normal rhythm until 08/11/2015 which showed AF. Echo from 08/11/15 showed an EF of 30-35%. Eliquis was started on 08/12/15. He underwent placement of BiV ICD 11/3/15 for primary SCA protection in the presence of NICM and class IIb CHF. On 12/18/15, device interrogation shows normal function, but multiple episodes of tachycardia which appear to be SVT. These occur at about 125 bpm and are associated with LH. 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 The antegrade slow AV puma pathway was ablated at that time. His 24 hr Holter monitor from 3/24/16 showed 41% PVC's. 60 second EKG strip in office shows very frequent PVC's of 3 distinct morphologies. He had an abnormal stress test on 07/08/16. He underwent a cardiac cath on 07/26/16 which resulted in a POBA of mid RCA. A stent could not be navigated to that area. He was started on Plavix  He was in the hospital on 3/28/17 for several days of SOB. Device check at that time showed 9 days of A fib. He underwent a cardioversion on 3/28/17. His device check today shows he has had some episodes of slow VT occurring in the evening. His device check, 3/23/2018, showed that he received an inappropriate shock on 3/9/2018 for AT. It also showed numerous episodes of tachycardia which were characterized as VT. He reported he has been taking his medications as prescribed. He denied chest pain, palpitations, syncope, dizziness, shortness of breath or edema. His device 4/6/18 showed paroxysmal atrial fib and PAT. He did receive a shock on April 1st for atrial fib. He states he has been taking his medication as prescribed. He denied chest pain, palpitations, syncope, dizziness, shortness of breath or edema. On 6/14/18 he presented for follow up and device management. He stated he had been having fatigue in the mornings. He also had leg pain. He stated he had been dizzy and this has slightly improved after his Florinef has been increased. He reported in the past that he checks his blood pressure at home and his SBP is always around 100. He underwent a left heart cath on 4/16/18 with PCI to distal LCX and PL branch. His echocardiogram from 3/28/17 demonstrated a LVEF of 30%. His device check shows his tachycardia starts with a PVC. ATP entrains A. EGM looks conducted. This terminates with with a PAC without resetting the V.  Device check 9/24/18 shows 105 episodes of apparent SVT since August 14th. He stated he would get dizzy at times. On 10/19/18 he underwent a catheter ablation for tachycardia. This demonstrated a slow conducting concealed posteroseptal AP which was successfully ablated. He reports today that he has been having intermittent dizziness that is relieved when he is able to lay down. He states his legs have been hurting recently. He has been taking an over the counter medication to relieve the pain. His wife has been taking his BP at home. She states that systolic is usually 72-58 and diastolic has been as low as 38. His wife reports he has not been drinking any water and usually only drinks coffee. Device check today shows normal function. He has 92% BiV pacing. There have been no recurrences of the SVT and no AF. The PVC frequency has declined from 22 PVCs/hr to 13 PVCs/ hr.  He has been taking his medications as prescribed. Patient currently denies any weight gain, edema, palpitations, chest pain, or shortness of breath. Past Medical History:   has a past medical history of Arthritis, Atrial fibrillation ([facility] [address] Cancer ([facility] [address] Cardiomyopathy ([facility] [address] CHF (congestive heart failure) ([facility] [address] High cholesterol, Hypertension, LBBB (left bundle branch block), and MI (myocardial infarction) ([facility] [address] Surgical History:   has a past surgical history that includes hernia repair; Ankle surgery; eye surgery (2010); Cataract removal with implant (1/27/11); Cataract removal with implant (2/24/11); joint replacement (8-2008); Skin cancer excision; Cardioversion (9/25/2015); Pacemaker insertion (Left, 12/2015); and other surgical history (Bilateral, 12/16/2016). Social History:   reports that he quit smoking about 31 years ago. He has never used smokeless tobacco. He reports that he drinks alcohol. He reports that he does not use drugs. Family History:  family history includes Diabetes in his brother and mother; Heart Failure in his father and mother. Home Medications:  Outpatient Encounter Medications as of 6/3/2019   Medication Sig Dispense Refill    aspirin 81 MG EC tablet Take 81 mg by mouth daily      amiodarone (CORDARONE) 200 MG tablet TAKE 1 TABLET BY MOUTH EVERY DAY 90 tablet 1    ELIQUIS 5 MG TABS tablet TAKE 1 TABLET BY MOUTH TWICE A DAY 60 tablet 11    midodrine (PROAMATINE) 2.5 MG tablet TAKE 1 TABLET BY MOUTH THREE TIMES A DAY 90 tablet 1    KLOR-CON M20 20 MEQ extended release tablet TAKE 1 TABLET BY MOUTH TWICE A DAY 60 tablet 5    amiodarone (CORDARONE) 200 MG tablet Take 0.5 tablets by mouth daily 30 tablet 3    Red Yeast Rice Extract (RED YEAST RICE PO) Take by mouth      furosemide (LASIX) 20 MG tablet Take 1 tablet by mouth daily as needed (for a 2 pound weight gain) 30 tablet 3    Aloe Vera 25 MG CAPS Take by mouth Not sure of dose      CINNAMON PO Take by mouth 2 times daily       multivitamin (THERAGRAN) per tablet Take 1 tablet by mouth daily.  Coenzyme Q10 (COQ-10 PO) Take  by mouth.  vitamin E 400 UNIT capsule Take 1 capsule by mouth daily.  [DISCONTINUED] Evolocumab 140 MG/ML SOAJ Inject 1 Syringe into the skin every 14 days 6 pen 3    [DISCONTINUED] clopidogrel (PLAVIX) 75 MG tablet TAKE 1 TABLET BY MOUTH DAILY 90 tablet 3     No facility-administered encounter medications on file as of 6/3/2019. Allergies:  Flomax [tamsulosin hcl] and Statins support therapy     Review of Systems   Constitutional: Negative. HENT: Negative. Eyes: Negative. Respiratory: Negative. Cardiovascular: Negative. Gastrointestinal: Negative. Genitourinary: Negative. Musculoskeletal: Negative. Skin: Negative. Neurological: Negative. Hematological: Negative. Psychiatric/Behavioral: Negative. BP 90/60   Pulse 66   Ht 6' 1\" (1.854 m)   Wt 188 lb (85.3 kg)   SpO2 98%   BMI 24.80 kg/m²        Objective:  Physical Exam   Constitutional: He is oriented to person, place, and time. He appears well-developed and well-nourished. HENT:   Head: Normocephalic and atraumatic. Eyes: Pupils are equal, round, and reactive to light. Neck: Normal range of motion. JVP <4 cm   Cardiovascular: irregular rate, frequent PVCs and normal heart sounds. Pulmonary/Chest: Effort normal and breath sounds normal.   Abdominal: Soft. No tenderness. Musculoskeletal: Normal range of motion. He exhibits no edema. Neurological: He is alert and oriented to person, place, and time. Skin: Skin is warm and dry. Psychiatric: He has a normal mood and affect. Assessment: 1. Ischemic CM  2. CHF- systolic  3. PSVT- s/p RFCA 10/2018. No recurrence  4. PAF- no recurrence  5. Dizziness/weakness- likely related to arterial hypotension    No vasodilator due to orthostatic hypotension and dizziness     Plan:  1. Reduce amiodarone to 100 mg (one half tab) every other day due to hypotension and dizziness  2.  Increase fluid intake to 1 liter per day

## 2019-06-03 NOTE — PROGRESS NOTES
[facility]   Cardiac Evaluation    Primary Care Doctor:  [address], [place]    Chief Complaint   Patient presents with    3 Month Follow-Up    Dizziness        History of Present Illness:   I had the pleasure of seeing Lm Wallis in follow up for CAD s/p PCI, NICM, sCHF, BiV ICD, AFib, SVT s/p RFA of SVT X 2 most recent on 10/18/18 as well as hx of HTN now with orthostatic hypotension on florinef, HLD with statin induced hepatitis. The florinef was on back order, substituted with midodrine 2.5mg tid. However was not taking this due to hypertension. In March, he continued to have dizziness but orthostatic BP's were negative. The Plavix was to be stopped in April, then to start aspirin instead. He continues on Eliquis and Amio for PAF and NSVT. His dizziness was improved with eliminating caffeine then returned, eliminated peanut butter with brief reprieve then dizziness returned. He is no longer taking the florinef or midodrine as was ineffective in treating the dizziness. He has taken any metoprolol in long time due to hypotension. His wife was told to give the metoprolol if systolic BP > 286. The dizziness is mostly in AM with first rising, improves with lying back down. He reports a \"gargling\" in lower chest overnight, improved with taking a water pill. He has taken a Lasix twice in past 2 weeks. His weight at home stays consistently 180 lbs. 4/9/19:  Carelink transmission shows normal sensing and pacing function. See interrogation for more details. Thoracic impedance trend stable. BIVP 94.4%  Hx PAF-on anticoag and amio. No AF recorded. Episodes Since: 06-Mar-2019  2 Non-sustained VT-EGMs ? V-SVT vs NSVT-both < 5 sec  OV JMB and NPDD 6-3-19    Lm Kadi describes symptoms including fatigue, dizziness but denies chest pain, dyspnea, palpitations, orthopnea, PND, early saiety, syncope.      NYHA:   II  ACC/ AHA Stage:    C    Past Medical History:   has a past medical history of Arthritis, Atrial fibrillation ([facility] [address] Cancer ([facility] [address] Cardiomyopathy ([facility] [address] CHF (congestive heart failure) ([facility] [address] High cholesterol, Hypertension, LBBB (left bundle branch block), and MI (myocardial infarction) ([facility] [address] Surgical History:   has a past surgical history that includes hernia repair; Ankle surgery; eye surgery (2010); Cataract removal with implant (1/27/11); Cataract removal with implant (2/24/11); joint replacement (8-2008); Skin cancer excision; Cardioversion (9/25/2015); Pacemaker insertion (Left, 12/2015); and other surgical history (Bilateral, 12/16/2016). Social History:   reports that he quit smoking about 31 years ago. He has never used smokeless tobacco. He reports that he drinks alcohol. He reports that he does not use drugs. Family History:   Family History   Problem Relation Age of Onset    Diabetes Mother     Heart Failure Mother         CHF    Heart Failure Father         CHF    Diabetes Brother        Home Medications:  Prior to Admission medications    Medication Sig Start Date End Date Taking?  Authorizing Provider   aspirin 81 MG EC tablet Take 81 mg by mouth daily   Yes Historical Provider, MD   amiodarone (CORDARONE) 200 MG tablet TAKE 1 TABLET BY MOUTH EVERY DAY 5/29/19  Yes Pushpa Yi MD   ELIQUIS 5 MG TABS tablet TAKE 1 TABLET BY MOUTH TWICE A DAY 4/2/19  Yes Roseanne Pal MD   midodrine (PROAMATINE) 2.5 MG tablet TAKE 1 TABLET BY MOUTH THREE TIMES A DAY 3/21/19  Yes Roseanne Pal MD   KLOR-CON M20 20 MEQ extended release tablet TAKE 1 TABLET BY MOUTH TWICE A DAY 1/23/19  Yes Harold Epley, APRN - CNP   amiodarone (CORDARONE) 200 MG tablet Take 0.5 tablets by mouth daily 8/14/18  Yes COLIN Velazquez CNP   Red Yeast Rice Extract (RED YEAST RICE PO) Take by mouth   Yes Historical Provider, MD   furosemide (LASIX) 20 MG tablet Take 1 tablet by mouth daily as needed (for a 2 pound weight gain) 1/22/16  Yes MD Zaria Aguilera 25 MG CAPS Take by mouth Not sure of dose   Yes Historical Provider, MD   CINNAMON PO Take by mouth 2 times daily    Yes Historical Provider, MD   multivitamin [facility]) per tablet Take 1 tablet by mouth daily. Yes Historical Provider, MD   Coenzyme Q10 (COQ-10 PO) Take  by mouth. Yes Historical Provider, MD   vitamin E 400 UNIT capsule Take 1 capsule by mouth daily. 10/7/11  Yes Tawnya Hernandez MD        Allergies:  Flomax [tamsulosin hcl] and Statins support therapy     Review of Systems:   Review of Systems   Constitutional: Positive for fatigue. HENT: Positive for hearing loss. Eyes: Positive for visual disturbance. Respiratory: Positive for shortness of breath. Cardiovascular: Positive for palpitations. Negative for chest pain and leg swelling. Neurological: Positive for dizziness. Negative for numbness. Psychiatric/Behavioral: Positive for sleep disturbance. Physical Examination:    Vitals:    06/03/19 1316   BP: 90/60   Pulse: 66   SpO2: 98%   Weight: 188 lb 12.8 oz (85.6 kg)   Height: 6' 1\" (1.854 m)        Constitutional and General Appearance: Warm and dry, no apparent distress, normal coloration  HEENT:  Normocephalic, atraumatic  Respiratory:  · Normal excursion and expansion without use of accessory muscles  · Resp Auscultation: Clear to auscultation   Cardiovascular:  · The apical impulses not displaced  · Heart tones are crisp and normal  · JVP 8 cm H2O  · Irregular rate and rhythm, normal E0M2, soft systolic murmur  · Peripheral pulses are symmetrical and full  · There is no clubbing, cyanosis of the extremities.   · No BLE edema  · Pedal Pulses: 2+ and equal   Abdomen:  · No masses or tenderness  · Liver/Spleen: No Abnormalities Noted  Neurological/Psychiatric:  · Alert and oriented in all spheres  · Moves all extremities well  · Exhibits normal gait balance and coordination  · No abnormalities of mood, affect, memory, mentation, or behavior are noted    Lab Data:  Most recent lab results below reviewed in office    CBC:   Lab Results   Component Value Date    WBC 5.4 10/18/2018    WBC 5.4 10/02/2018    WBC 5.3 07/18/2018    RBC 4.44 10/18/2018    RBC 4.28 10/02/2018    RBC 4.25 07/18/2018    HGB 14.6 10/18/2018    HGB 14.2 10/02/2018    HGB 14.0 07/18/2018    HCT 42.6 10/18/2018    HCT 41.3 10/02/2018    HCT 41.2 07/18/2018    MCV 96.0 10/18/2018    MCV 96.6 10/02/2018    MCV 97.1 07/18/2018    RDW 14.9 10/18/2018    RDW 14.9 10/02/2018    RDW 14.5 07/18/2018     10/18/2018     10/02/2018     07/18/2018     BMP:  Lab Results   Component Value Date     04/25/2019     03/06/2019     12/04/2018    K 4.6 04/25/2019    K 4.4 03/06/2019    K 3.8 12/04/2018    K 3.6 04/17/2018     04/25/2019     03/06/2019     12/04/2018    CO2 24 04/25/2019    CO2 23 03/06/2019    CO2 27 12/04/2018    PHOS 3.8 03/06/2019    PHOS 3.7 07/18/2018    PHOS 3.5 08/12/2015    BUN 28 04/25/2019    BUN 30 03/06/2019    BUN 20 12/04/2018    CREATININE 1.3 04/25/2019    CREATININE 1.3 03/06/2019    CREATININE 1.2 12/04/2018     BNP:   Lab Results   Component Value Date    PROBNP 9,246 03/27/2017    PROBNP 3,784 08/21/2015    PROBNP 5,149 08/11/2015     LIPID:   Lab Results   Component Value Date    TRIG 77 04/16/2018    TRIG 73 07/19/2017    HDL 48 04/25/2019    HDL 54 10/02/2018    LDLCALC 154 04/25/2019    [address] 163 10/02/2018       Cardiac Imaging:  EPS/ RVA 10/18/18  1. Comprehensive electrophysiology study with left atrial pacing and recording. 2.  Electroanatomic three-dimensional intracardiac mapping. 3.  Electrophysiology study, post-drug infusion. 4.  Catheter ablation of SVT. INDICATION FOR PROCEDURE:  Recurrent SVT. FINDINGS ON ELECTROPHYSIOLOGY STUDY:  1. Sinus bradycardia with very poor AV conduction and wide left bundle branch block. 2.  Very frequent multiform PVCs. 3.  Inducible clinical SVT on isoproterenol.   4. The SVT could be induced by pacing from the coronary sinus or from right ventricle. The retrograde activation sequence was eccentric with posteroseptal and atrial septum being activated initially. RESULTS OF RADIOFREQUENCY CATHETER ABLATION PROCEDURE:   1. Successful ablation of posteroseptal concealed decremental accessory pathway. 2.  No evidence for accessory pathway conduction or arrhythmia post ablation. INDICATION:  1. Ventricular fibrillation. 2.  Unstable angina. 3.  Coronary artery disease. Apr 2018: CARDIAC CATH   PROCEDURE PERFORMED:  1.  Bilateral coronary angiography. 2.  Left heart catheterization. 3.  FFR of mid RCA. 4.  PCI of distal circumflex extending into left PL branch with Synergy Drug-eluting stent. 5.  Moderate sedation. 6.  Ultrasound-guided right common femoral arterial access. FINDINGS:  1. Hemodynamics:  AO is 145/91, mean of 110, , LVEDP was 20 mmHg. There was no gradient across the aortic valve. 2.  LV gram was not done due to preserve the dye as the patient has severe left ventricular systolic function. Previous EF was 50% in 03/2017. CORONARY ANGIOGRAPHY:  1. Left main was a large-caliber vessel that trifurcated. Mid to distal left main had 30% diffuse stenosis. 2.  Left circumflex was a large-caliber vessel. There was no ____ noted to come off the left circumflex. In the posterior AV groove, it gave off a large PL branch that had 80% proximal stenosis, which was reduced to 0% post stent placing. The circumflex had diffuse stenosis of 40% in the mid segment. 3.  Ramus was a large-caliber vessel that bifurcated. Proximal ramus had 20% diffuse stenosis. 4. LAD was a large caliber vessel that reached the apex. Proximal LAD had 50% calcific stenosis. The LAD gave off three diagonal branches. The first was small. The second was a medium-caliber that had 90% ostial stenosis. The third was very small.   5.  Right coronary artery was a large and dominant and had a diffuse stenosis of 50% proximal and mid RCA. However, FFR across the segment was insignificant. The RCA gave off medium-caliber right posterior descending artery, which was free of significant disease. IMPRESSION:  1. Successful PCI of high-grade proximal left PL branch with Synergy drug-eluting stent. 2.  Moderate disease of proximal to mid RCA with insignificant FFR. 3.  Moderate disease of proximal LAD. 4.  Normal left ventricular filling pressure. RECOMMENDATION:  The patient was brought to the cardiac cath lab as he had typical Vfib arrest, for which he received appropriate ICD shock. He was also reporting worse dizziness and some chest discomfort. Therefore, he was brought to the cardiac cath. He underwent angioplasty of mid RCA lesion back in 07/2016. At that time, they had trouble intervene a 2.5 mm noncompliant balloon and the result was also optimal.  However, on today's cardiac cath the lesion had improved to more than moderate lesion and the FFR was significant. Therefore, we did not intervene the lesion. For the lesion that was stented in the PL branch, he will stay on Plavix at least for six months. He will continue on aspirin for 30 days. He is on Eliquis for atrial fibrillation. ECHO:  3/27/18  The left ventricular systolic function is severely reduced with an ejection fraction of 15 % by Diana's. This has decreased from echo done 3/24/2016 when LVEF was 30%. Elevated left ventricular diastolic filling pressure. Left ventricular size is moderately dilated . Normal left ventricular wall thickness. Inferolateral wall and inferior wall akinesia. Inferoseptal wall dyskinesia.      Stress test: 4/12/17   - Abnormal high risk myocardial perfusion study due to severe left  ventricular dysfunction.  - There is a medium size moderate intensity perfusion defect involving the  apex, apical inferior, mid inferoseptum and apical lateral walls during stress that does not improve at rest consistent with prior infarct. - There is a small size mild intensity perfusion defect involving the apical  anterior wall during stress that reverses at rest suggestive of ischemia. - Apical segments are akinetic. Septal motion consistent with paced rhythm. - Left ventricular systolic function is severely reduced with ejection  fraction of 24 %. ECHO: 3/24/16  Left ventricular systolic function is severely reduced with an ejection  fraction of 30 %. Severe global hypokinesis. Diastolic filling parameters suggests grade I diastolic dysfunction. Mild aortic regurgitation. Systolic pulmonary artery pressure (SPAP) is normal and estimated at 25 mmHg  (RA pressure 3 mmHg). Pacer / ICD wire is visualized in the right ventricle. Frequent ectopy throughout the study with runs of bigeminy. Last echo on 11/4/2015 showed EF of 30%. HOLTER MONITOR: 4/2016  PVC burden 41.3%  7 runs of NSVT     Lexiscan Myoview: 07/08/2016  Abnormal moderate risk myocardial perfusion study    There is an area of ischemia noted within the anterior-septal wall. There  are fixed defects within the apex, inferior-basal, and inferior-septal walls  consistent with prior infarction. The estimated left ventricular function is 25%. The left ventricular size is severely dilated and globally hypokinetic. LEFT HEART CATH: 07/26/2016  LM: mid/distal 20%  LAD: prox 30-40%, luminals otherwise  Ramus: mild diffuse, 15% prox  LCX: mild diffuse 10-15% all throughout   RCA: dominant, mid long section of disease, tortuous 85%   LVEDP: 14  LVEF: 20%, inferior akinesis and severe hypokinesis otherwise   PCI of mid RCA  Runthru, whisper,  150  Guideline  NC trek 2 x 20mm to predilation  Unable to pass 2.5mm balloon, all equipment pulled back,. Not able to stent  Plan to proceed with POBA only: post residual 20%   PLAN  1. Severe CAD of mid RCA, Type C lesions. Very difficult to intervene. Dr. Miguel Ivy and device check today  5. Follow up with me in 3 months       I appreciate the opportunity of cooperating in the care of this individual.    Monse Galarza, COLIN - CNP, 6/3/2019, 1:23 PM       QUALITY MEASURES  1. Tobacco Cessation Counseling: NA  2. Retake of BP if >140/90:   NA  3. Documentation to PCP/referring for new patient:  Sent to PCP at close of office visit  4. CAD patient on anti-platelet: Yes  5. CAD patient on STATIN therapy:  No (statin induced hepatitis, on red yeast rice)  6.  Patient with CHF and aFib on anticoagulation:  Yes

## 2019-06-03 NOTE — PATIENT INSTRUCTIONS
1. Continue to use the furosemide (Lasix) 20 mg as needed for weight gain, swelling or \"gurgling\"  2. Continue the 2 potassium pills per day  3. Repeat blood work within the next month to check potassium  4. Follow up with Dr. Neelam Gonzalez and device check today  5. Follow up with me in 3 months        Ref.  Range 10/2/2018 10:18 4/25/2019 10:33   Cholesterol, Fasting Latest Ref Range: 0 - 199 mg/dL 233 (H) 220 (H)   HDL Cholesterol Latest Ref Range: 40 - 60 mg/dL 54 48   LDL Calculated Latest Ref Range: <70 mg/dL 163 (H) 154 (H)   Triglyceride, Fasting Latest Ref Range: 0 - 150 mg/dL 78 91

## 2019-06-03 NOTE — PROGRESS NOTES
[address]   Cardiac follow up       HPI:  Yesika Couch is a 80 y.o. male who presents for follow up of non ischemic cardiomyopathy and arrhythmia. His wife stated he had an irregular rhythm for years. However the EKGs available showed normal rhythm until 08/11/2015 which showed AF. Echo from 08/11/15 showed an EF of 30-35%. Eliquis was started on 08/12/15. He underwent placement of BiV ICD 11/3/15 for primary SCA protection in the presence of NICM and class IIb CHF. On 12/18/15, device interrogation shows normal function, but multiple episodes of tachycardia which appear to be SVT. These occur at about 125 bpm and are associated with LH. 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 The antegrade slow AV puma pathway was ablated at that time. His 24 hr Holter monitor from 3/24/16 showed 41% PVC's. 60 second EKG strip in office shows very frequent PVC's of 3 distinct morphologies. He had an abnormal stress test on 07/08/16. He underwent a cardiac cath on 07/26/16 which resulted in a POBA of mid RCA. A stent could not be navigated to that area. He was started on Plavix  He was in the hospital on 3/28/17 for several days of SOB. Device check at that time showed 9 days of A fib. He underwent a cardioversion on 3/28/17. His device check today shows he has had some episodes of slow VT occurring in the evening. His device check, 3/23/2018, showed that he received an inappropriate shock on 3/9/2018 for AT. It also showed numerous episodes of tachycardia which were characterized as VT. He reported he has been taking his medications as prescribed. He denied chest pain, palpitations, syncope, dizziness, shortness of breath or edema. His device 4/6/18 showed paroxysmal atrial fib and PAT.   He did receive a shock on April 1st for atrial fib. He states he has been taking his medication as prescribed. He denied chest pain, palpitations, syncope, dizziness, shortness of breath or edema. On 6/14/18 he presented for follow up and device management. He stated he had been having fatigue in the mornings. He also had leg pain. He stated he had been dizzy and this has slightly improved after his Florinef has been increased. He reported in the past that he checks his blood pressure at home and his SBP is always around 100. He underwent a left heart cath on 4/16/18 with PCI to distal LCX and PL branch. His echocardiogram from 3/28/17 demonstrated a LVEF of 30%. His device check shows his tachycardia starts with a PVC. ATP entrains A. EGM looks conducted. This terminates with with a PAC without resetting the V.  Device check 9/24/18 shows 105 episodes of apparent SVT since August 14th. He stated he would get dizzy at times. On 10/19/18 he underwent a catheter ablation for tachycardia. This demonstrated a slow conducting concealed posteroseptal AP which was successfully ablated. He reports today that he has been having intermittent dizziness that is relieved when he is able to lay down. He states his legs have been hurting recently. He has been taking an over the counter medication to relieve the pain. His wife has been taking his BP at home. She states that systolic is usually 21-20 and diastolic has been as low as 38. His wife reports he has not been drinking any water and usually only drinks coffee. Device check today shows normal function. He has 92% BiV pacing. There have been no recurrences of the SVT and no AF. The PVC frequency has declined from 22 PVCs/hr to 13 PVCs/ hr.  He has been taking his medications as prescribed. Patient currently denies any weight gain, edema, palpitations, chest pain, or shortness of breath.       Past Medical History:   has a past medical history of Arthritis, Atrial fibrillation ([address] SOAJ Inject 1 Syringe into the skin every 14 days 6 pen 3    [DISCONTINUED] clopidogrel (PLAVIX) 75 MG tablet TAKE 1 TABLET BY MOUTH DAILY 90 tablet 3     No facility-administered encounter medications on file as of 6/3/2019. Allergies:  Flomax [tamsulosin hcl] and Statins support therapy     Review of Systems   Constitutional: Negative. HENT: Negative. Eyes: Negative. Respiratory: Negative. Cardiovascular: Negative. Gastrointestinal: Negative. Genitourinary: Negative. Musculoskeletal: Negative. Skin: Negative. Neurological: Negative. Hematological: Negative. Psychiatric/Behavioral: Negative. BP 90/60   Pulse 66   Ht 6' 1\" (1.854 m)   Wt 188 lb (85.3 kg)   SpO2 98%   BMI 24.80 kg/m²       Objective:  Physical Exam   Constitutional: He is oriented to person, place, and time. He appears well-developed and well-nourished. HENT:   Head: Normocephalic and atraumatic. Eyes: Pupils are equal, round, and reactive to light. Neck: Normal range of motion. JVP <4 cm   Cardiovascular: irregular rate, frequent PVCs and normal heart sounds. Pulmonary/Chest: Effort normal and breath sounds normal.   Abdominal: Soft. No tenderness. Musculoskeletal: Normal range of motion. He exhibits no edema. Neurological: He is alert and oriented to person, place, and time. Skin: Skin is warm and dry. Psychiatric: He has a normal mood and affect. Assessment: 1. Ischemic CM  2. CHF- systolic  3. PSVT- s/p RFCA 10/2018. No recurrence  4. PAF- no recurrence  5. Dizziness/weakness- likely related to arterial hypotension    No vasodilator due to orthostatic hypotension and dizziness     Plan:  1. Reduce amiodarone to 100 mg (one half tab) every other day due to hypotension and dizziness  2. Increase fluid intake to 1 liter per day  3. Remote device check q 3 months- monitor Optivol  4. Follow up in 6 months  5. Check BP daily    QUALITY MEASURES  1.  Tobacco Cessation Counseling: NA  2. Retake of BP if >140/90:   NA  3. Documentation to PCP/referring for new patient:  Sent to PCP at close of office visit  4. CAD patient on anti-platelet: NA   5. CAD patient on STATIN therapy:  NA  6. Patient with CHF and aFib on anticoagulation:  Yes     Scribe's attestation: This note was scribed in the presence of Dr. Emily Fairchild by Rubio Mora, RN    I, Dr. Emily Fairchild, personally performed the services described in this documentation as scribed by Rubio Mora RN in my presence, and it is both accurate and complete.       Emily Fairchild M.D.

## 2019-06-03 NOTE — PROGRESS NOTES
Interrogation and programming evaluation of the device shows normal function, with stable sensing and pacing thresholds. See interrogation for details. The pt will see Dr Dewayne Tian today. Recheck remotely 3 mos.

## 2019-08-12 PROBLEM — R06.02 SOB (SHORTNESS OF BREATH): Status: ACTIVE | Noted: 2019-01-01

## 2019-08-21 NOTE — TELEPHONE ENCOUNTER
Spoke to patients wife, he saw his PCP and was given lasix and to f/u with them. If she needs anything else she will let us know.

## 2019-09-03 PROBLEM — I48.19 ATRIAL FIBRILLATION, PERSISTENT (HCC): Status: ACTIVE | Noted: 2019-01-01

## 2019-09-03 NOTE — LETTER
RBC 4.44 10/18/2018    RBC 4.28 10/02/2018    HGB 13.9 08/12/2019    HGB 14.6 10/18/2018    HGB 14.2 10/02/2018    HCT 41.5 08/12/2019    HCT 42.6 10/18/2018    HCT 41.3 10/02/2018    MCV 99.1 08/12/2019    MCV 96.0 10/18/2018    MCV 96.6 10/02/2018    RDW 14.7 08/12/2019    RDW 14.9 10/18/2018    RDW 14.9 10/02/2018     08/12/2019     10/18/2018     10/02/2018     BMP:  Lab Results   Component Value Date     08/29/2019     08/12/2019     04/25/2019    K 4.7 08/29/2019    K 4.3 08/12/2019    K 4.6 04/25/2019    K 3.6 04/17/2018     08/29/2019     08/12/2019     04/25/2019    CO2 22 08/29/2019    CO2 24 08/12/2019    CO2 24 04/25/2019    PHOS 3.8 03/06/2019    PHOS 3.7 07/18/2018    PHOS 3.5 08/12/2015    BUN 29 08/29/2019    BUN 29 08/12/2019    BUN 28 04/25/2019    CREATININE 1.3 08/29/2019    CREATININE 1.4 08/12/2019    CREATININE 1.3 04/25/2019     BNP:   Lab Results   Component Value Date    PROBNP 3,086 08/12/2019    PROBNP 9,246 03/27/2017    PROBNP 3,784 08/21/2015     LIPID:   Lab Results   Component Value Date    TRIG 77 04/16/2018    TRIG 73 07/19/2017    HDL 48 04/25/2019    HDL 54 10/02/2018    LDLCALC 154 04/25/2019    [address] 163 10/02/2018       Cardiac Imaging:  EPS/ RVA 10/18/18  1. Comprehensive electrophysiology study with left atrial pacing and recording. 2.  Electroanatomic three-dimensional intracardiac mapping. 3.  Electrophysiology study, post-drug infusion. 4.  Catheter ablation of SVT. INDICATION FOR PROCEDURE:  Recurrent SVT. FINDINGS ON ELECTROPHYSIOLOGY STUDY:  1. Sinus bradycardia with very poor AV conduction and wide left bundle branch block. 2.  Very frequent multiform PVCs. 3.  Inducible clinical SVT on isoproterenol. 4.  The SVT could be induced by pacing from the coronary sinus or from right ventricle.   The retrograde activation sequence was eccentric with

## 2019-09-03 NOTE — PATIENT INSTRUCTIONS
Plan:  1. Increase amiodarone to 200 mg twice a day for 2 weeks, then decrease to 200 mg daily   2. Do not miss any doses of Eliquis   3. Will plan for cardioversion after 4 weeks of not missing any doses of Eliquis   3. Remote device check q 3 months- monitor Optivol  4.  Follow up with me after the procedure

## 2019-09-03 NOTE — PROGRESS NOTES
(8-2008); Skin cancer excision; Cardioversion (9/25/2015); Pacemaker insertion (Left, 12/2015); and other surgical history (Bilateral, 12/16/2016). Social History:   reports that he quit smoking about 31 years ago. He has never used smokeless tobacco. He reports that he drinks alcohol. He reports that he does not use drugs. Family History:   Family History   Problem Relation Age of Onset    Diabetes Mother     Heart Failure Mother         CHF    Heart Failure Father         CHF    Diabetes Brother        Home Medications:  Prior to Admission medications    Medication Sig Start Date End Date Taking? Authorizing Provider   furosemide (LASIX) 20 MG tablet Take 1 tablet by mouth daily as needed (for a 2 pound weight gain) 8/12/19  Yes Richard Gutierrez MD   KLOR-CON M20 20 MEQ extended release tablet TAKE 1 TABLET BY MOUTH TWICE A DAY 8/5/19  Yes COLIN Veliz - CNP   amiodarone (CORDARONE) 200 MG tablet Take 0.5 tablets by mouth every other day 6/4/19  Yes Howard Salazar MD   aspirin 81 MG EC tablet Take 81 mg by mouth daily   Yes Historical Provider, MD   ELIQUIS 5 MG TABS tablet TAKE 1 TABLET BY MOUTH TWICE A DAY 4/2/19  Yes Sophie Seaman MD   Red Yeast Rice Extract (RED YEAST RICE PO) Take by mouth   Yes Historical Provider, MD   Aloe Vera 25 MG CAPS Take by mouth Not sure of dose   Yes Historical Provider, MD   CINNAMON PO Take by mouth 2 times daily    Yes Historical Provider, MD   multivitamin [facility]) per tablet Take 1 tablet by mouth daily. Yes Historical Provider, MD   Coenzyme Q10 (COQ-10 PO) Take  by mouth. Yes Historical Provider, MD   vitamin E 400 UNIT capsule Take 1 capsule by mouth daily.  10/7/11  Yes Judit Cevallos MD   amiodarone (CORDARONE) 200 MG tablet TAKE 1 TABLET BY MOUTH EVERY DAY  Patient not taking: Reported on 9/3/2019 5/29/19   Howard Salazar MD   midodrine (PROAMATINE) 2.5 MG tablet TAKE 1 TABLET BY MOUTH THREE TIMES A DAY 3/21/19   Teodoro Kocher Oliver Castro MD        Allergies:  Flomax [tamsulosin hcl] and Statins support therapy     Review of Systems:   Review of Systems   Constitutional: Positive for fatigue. Negative for activity change and appetite change. HENT: Positive for hearing loss. Respiratory: Positive for cough and shortness of breath. Cardiovascular: Positive for leg swelling. Negative for chest pain and palpitations. Genitourinary: Positive for frequency. Musculoskeletal: Positive for gait problem. Neurological: Positive for dizziness. Negative for syncope. Psychiatric/Behavioral: Positive for sleep disturbance. Physical Examination:    Vitals:    09/03/19 1304   BP: 120/62   Pulse: 60   SpO2: 95%   Weight: 189 lb 9.6 oz (86 kg)   Height: 6' 1\" (1.854 m)        Constitutional and General Appearance: Warm and dry, no apparent distress, normal coloration  HEENT:  Normocephalic, atraumatic  Respiratory:  · Normal excursion and expansion without use of accessory muscles  · Resp Auscultation: Clear to auscultation   Cardiovascular:  · The apical impulses not displaced  · Heart tones are crisp and normal  · JVP 8 cm H2O  · Irregular rate and rhythm, normal S1S2, no m/g/r  · Peripheral pulses are symmetrical and full  · There is no clubbing, cyanosis of the extremities.   · 1+ non-pitting BLE edema  · Pedal Pulses: 2+ and equal   Abdomen:  · No masses or tenderness  · Liver/Spleen: No Abnormalities Noted  Neurological/Psychiatric:  · Alert and oriented in all spheres  · Moves all extremities well  · Exhibits normal gait balance and coordination  · No abnormalities of mood, affect, memory, mentation, or behavior are noted    Lab Data:  Most recent lab results below reviewed in office    CBC:   Lab Results   Component Value Date    WBC 4.8 08/12/2019    WBC 5.4 10/18/2018    WBC 5.4 10/02/2018    RBC 4.19 08/12/2019    RBC 4.44 10/18/2018    RBC 4.28 10/02/2018    HGB 13.9 08/12/2019    HGB 14.6 10/18/2018    HGB 14.2 10/02/2018 systolic function is severely reduced with ejection  fraction of 24 %. ECHO: 3/24/16  Left ventricular systolic function is severely reduced with an ejection  fraction of 30 %. Severe global hypokinesis. Diastolic filling parameters suggests grade I diastolic dysfunction. Mild aortic regurgitation. Systolic pulmonary artery pressure (SPAP) is normal and estimated at 25 mmHg  (RA pressure 3 mmHg). Pacer / ICD wire is visualized in the right ventricle. Frequent ectopy throughout the study with runs of bigeminy. Last echo on 11/4/2015 showed EF of 30%. HOLTER MONITOR: 4/2016  PVC burden 41.3%  7 runs of NSVT     Lexiscan Myoview: 07/08/2016  Abnormal moderate risk myocardial perfusion study    There is an area of ischemia noted within the anterior-septal wall. There  are fixed defects within the apex, inferior-basal, and inferior-septal walls  consistent with prior infarction. The estimated left ventricular function is 25%. The left ventricular size is severely dilated and globally hypokinetic. LEFT HEART CATH: 07/26/2016  LM: mid/distal 20%  LAD: prox 30-40%, luminals otherwise  Ramus: mild diffuse, 15% prox  LCX: mild diffuse 10-15% all throughout   RCA: dominant, mid long section of disease, tortuous 85%     LVEDP: 14  LVEF: 20%, inferior akinesis and severe hypokinesis otherwise     PCI of mid RCA  Runthru, whisper,  150  Guideline  NC trek 2 x 20mm to predilation  Unable to pass 2.5mm balloon, all equipment pulled back,. Not able to stent  Plan to proceed with POBA only: post residual 20%     PLAN  1. Severe CAD of mid RCA, Type C lesions. Very difficult to intervene. Guideline used. Dilated to 2.00mm balloon (inflated to 16atm- hyperatm), tried to dilate with 2.5 mm balloon but would not pass even with guideliner, felt low chance of stent delivery. Decided to abort given ok result of 20% residual stenosis. Will cont medical tx.  Dr Brian Concepcion consulted following case as he is primary cardiologist   2. Cont ASA 81mg qday for now and effient 10mg po qday  Echo 8/11/15:   Ejection fraction is visually estimated to be 30-35 %. Moderate to severe global hypokinesis is present. Left ventricle size is normal.  Normal left ventricular wall thickness. Doppler study suggests diastolic dysfunction. The left atrium is mildly dilated by volume measurement. The right atrium is mildly dilated. The aortic root is mildly dilated. Aortic valve appears tricuspid and mildly sclerotic but opens adequately. There is trivial to mild tricuspid regurgitation. Systolic pulmonary artery pressure (SPAP) is normal and estimated at 38 mmHg (RA pressure 15 mmHg). Mild-to-moderate pulmonic regurgitation is present. Assessment:    1. Non-ischemic cardiomyopathy ([address]    2. Chronic systolic congestive heart failure ([address]    3. Biventricular ICD (implantable cardioverter-defibrillator) in place    4. Paroxysmal atrial fibrillation (HCC)    5. SVT (supraventricular tachycardia) ([address]    6. Coronary artery disease involving native coronary artery of native heart without angina pectoris    7. Statin intolerance    8. Mixed hyperlipidemia    9. Essential hypertension    10. NSVT (nonsustained ventricular tachycardia) ([address]          Plan:   1. Decrease the furosemide (Lasix) to 20 mg every other day  2. Stay on 2 potassium daily  3. Keep fluid intake at least 2 liters per day  4. Follow up with Dr. Namita Parker today  5. Follow up with me in 3 months      I appreciate the opportunity of cooperating in the care of this individual.    Junior Taveras, COLIN - CNP, 9/3/2019, 1:16 PM       QUALITY MEASURES  1. Tobacco Cessation Counseling: NA  2. Retake of BP if >140/90:   NA  3. Documentation to PCP/referring for new patient:  Sent to PCP at close of office visit  4. CAD patient on anti-platelet: Yes  5. CAD patient on STATIN therapy:  No (statin induced hepatitis)  6.  Patient with CHF and aFib on anticoagulation:  Yes

## 2019-09-05 NOTE — TELEPHONE ENCOUNTER
Call received from patient's wife. She states she has tried to give patient his lasix every other day and he starts to feel very SOB by mid morning on the day he does not take it. Patient was seen by you on 9/3/19 so he has only been doing this for a few days. He is also been SOB at night the last few days. Feels better after he takes a Lasix.

## 2019-09-09 NOTE — TELEPHONE ENCOUNTER
Pt was instructed to NOT miss a dose of Eliquis so he could have cardioversion. Pt missed his Saturday evening dose. Do they now have to adjust appt for CV. Wife is concerned that pt is getting worse. Pt took his AM furosemide (LASIX) 20 MG tablet. But this is not helping. Can Brady Leonardo give him another dose. Please advise.

## 2019-09-09 NOTE — TELEPHONE ENCOUNTER
Spoke to wife. Pt has SOB. No weight increase. His BP is 72/42 with a pulse of 79. He wakes up every night between 2AM and 4 AM with a wet cough and increased SOB.

## 2019-09-11 PROBLEM — I50.9 CHF (NYHA CLASS III, ACC/AHA STAGE C) (HCC): Status: ACTIVE | Noted: 2019-01-01

## 2019-09-11 NOTE — CONSULTS
09/11/2019    K 4.3 09/11/2019    K 3.6 04/17/2018     09/11/2019    CO2 22 09/11/2019    BUN 28 09/11/2019    CREATININE 1.3 09/11/2019    GFRAA >60 09/11/2019    GFRAA >60 09/28/2012    AGRATIO 1.3 09/11/2019    LABGLOM 52 09/11/2019    GLUCOSE 129 09/11/2019    PROT 6.7 09/11/2019    PROT 7.3 09/28/2012    CALCIUM 9.1 09/11/2019    BILITOT 0.7 09/11/2019    ALKPHOS 69 09/11/2019    AST 24 09/11/2019    ALT 16 09/11/2019     PT/INR:  No results found for: PTINR  Lab Results   Component Value Date    TROPONINI <0.01 09/11/2019       EKG:  I have reviewed EKG with the following interpretation:  Impression:  See HPI    Denver Ables 4/12/17   Summary    - Abnormal high risk myocardial perfusion study due to severe left    ventricular dysfunction.    - There is a medium size moderate intensity perfusion defect involving the    apex, apical inferior, mid inferoseptum and apical lateral walls during    stress that does not improve at rest consistent with prior infarct.    - There is a small size mild intensity perfusion defect involving the apical    anterior wall during stress that reverses at rest suggestive of ischemia.    - Apical segments are akinetic. Septal motion consistent with paced rhythm.    - Left ventricular systolic function is severely reduced with ejection    fraction of 24 %. Assessment:  To Ye is a 80 y.o. patient who presented to [facility] 9/11/19 with c/o acute SOB. Follows with Patrick Momin, and ILAN Curiel. Former Dr. Abdias Shoemaker patient. He has significant PMH of CAD s/p PCI to distal L. CCx and PLV in March 2018, non-ischemic CM, chronic systolic CHF, s/p BiV-ICD 11/15, hx SVT s/p RFA x 2 (most recent 10/18), hx afib dx 2015 (initially PAF but now persistent since July 2019), and orthostatic hypotension without response to midodrine. Most recent lexiscan myoview 4/17 showed mainly infarct with small size ischemia apical anterior wall; EF=24%.  Note no cath recommended at the artery disease involving native coronary artery of native heart without angina pectoris    Non-ischemic cardiomyopathy ([address]    Chronic systolic congestive heart failure (HCC)    Coronary artery disease involving native coronary artery without angina pectoris    Essential hypertension    Biventricular ICD (implantable cardioverter-defibrillator) in place    SVT (supraventricular tachycardia) (HCC)    Abnormal stress test    Neoplasm of uncertain behavior of skin    Persistent atrial fibrillation (HCC)    Atrial tachycardia (HCC)    Paroxysmal atrial fibrillation (HCC)    Abnormal glucose    Essential hypertension    PVC (premature ventricular contraction)    Unstable angina (HCC)    NSVT (nonsustained ventricular tachycardia) (HCC)    Abnormal glucose    Dizziness    SOB (shortness of breath)    Atrial fibrillation, persistent ([address]        Thank you for allowing to us to participate in the care or Genet Stewart. Further evaluation will be based upon the patient's clinical course and testing results.

## 2019-09-11 NOTE — PROGRESS NOTES
Patient admitted to room 205 from ED. Patient oriented to room, call light, bed rails, phone, lights and bathroom. Patient instructed about the schedule of the day including: vital sign frequency, lab draws, possible tests, frequency of MD and staff rounds, including RN/MD rounding together at bedside, daily weights, and I &O's. Patient instructed about prescribed diet, how to use 8MENU, and television. Bed alarm in place, patient aware of placement and reason. Telemetry box in place, patient aware of placement and reason. Bed locked, in lowest position, side rails up 2/4, call light within reach. Will continue to monitor.

## 2019-09-11 NOTE — PROGRESS NOTES
4 Eyes Skin Assessment     The patient is being assess for  Admission    I agree that 2 RN's have performed a thorough Head to Toe Skin Assessment on the patient. ALL assessment sites listed below have been assessed. Areas assessed by both nurses:   [x]   Head, Face, and Ears   [x]   Shoulders, Back, and Chest  [x]   Arms, Elbows, and Hands   [x]   Coccyx, Sacrum, and IschIum  [x]   Legs, Feet, and Heels        Does the Patient have Skin Breakdown?   No         Kash Prevention initiated:  No   Wound Care Orders initiated:  No      [facility] nurse consulted for Pressure Injury (Stage 3,4, Unstageable, DTI, NWPT, and Complex wounds), New and Established Ostomies:  No      Nurse 1 eSignature: Electronically signed by Bogdan Lopez RN on 9/11/19 at 6:34 PM    **SHARE this note so that the co-signing nurse is able to place an eSignature**    Nurse 2 eSignature: {Esignature:[id number]}

## 2019-09-11 NOTE — ED PROVIDER NOTES
undetermined rhythm precludes rhythm comparison, needs review       I spoke with Dr. Kali Bryant. We thoroughly discussed the history, physical exam, laboratory and imaging studies, as well as, emergency department course. Based upon that discussion, we've decided to admit Tomy Jones for further observation and evaluation of Nahun Gomez's dyspnea. As I have deemed necessary from their history, physical, and studies, I have considered and evaluated Tomy Jones for the following diagnoses:      FINAL IMPRESSION  1. Acute on chronic congestive heart failure, unspecified heart failure type ([address]    2. Hypoxia    3. Shortness of breath        Vitals:  Blood pressure 96/79, pulse 83, temperature 97.1 °F (36.2 °C), temperature source Oral, resp. rate 16, height 6' 1\" (1.854 m), weight 189 lb (85.7 kg), SpO2 97 %.     DISPOSITION  Patient was admitted in stable condition          Tyler Oppenheim, COLIN - CNP  09/11/19 4275

## 2019-09-11 NOTE — H&P
Hospital Medicine History & Physical      PCP: Bran Penny MD    Date of Admission: 9/11/2019    Date of Service: Pt seen/examined on 9/11/2019 and Admitted to Inpt    Chief Complaint:  Progressive shortness of breath      History Of Present Illness: The patient is a 80 y.o. male who presents to [facility] with progressive shortness of breath. Patient states that he woke up abruptly at 1:00 in the morning extremely short of breath. His wife who accompanies him states that he was unable to lay down comfortably. At first he thought this was more consistent with an upper respiratory infection or cough, but his symptoms did not improve throughout the night. At 4:00 in the morning his wife gave him an oral tablet of 20 mg of Lasix as she was concerned this was most consistent with a congestive heart failure episode. He took the medicine, but had no urinary output. She was concerned that the medicine was ineffective and that he was going into heart failure and brought him into the emergency department for further medical care and evaluation. Denies: CP or his anginal equivalents previously experienced, light headedness, palpitations, headache, visual changes. No cough, increased secretions, rhinorrhea, or fever/chills    Patient was placed to the Hospitalist service for further medical care and evaluation. Past Medical History:        Diagnosis Date    Arthritis     [facility]     Cancer [facility]) about [id number]    Both [facility] had s/p Mohs 1 year apart,  in [facility]), and a different Dr.? name.     Cardiomy[facility]     CHF (congestive heart failure) (HCC)     High cholesterol     Hypertension     LBBB (left bundle branch block)     MI (myocardial infarction) [facility])        Past Surgical History:        Procedure Laterality Date    ANKLE SURGERY      CARDIOVERSION  9/25/2015    Atrial Fib- SB    CATARACT REMOVAL WITH IMPLANT Essential hypertension [I10] 03/05/2018    Acute on chronic systolic CHF (congestive heart failure) ([facility]) [I50.23]     Coronary artery disease involving native coronary artery of native heart with angina pectoris ([facility] [I25.119] 08/11/2015    Myocardiopathy ([facility] [I42.9] 08/11/2015    Hypercholesterolemia [E78.00]        ASSESSMENT/PLAN:  Acute on chronic systolic congestive heart failure with exacerbation in the setting of well-known cardiomyopathy: Medical exam appears well compensated. Patient was last seen in the office on September 3, 2019 and instructed to take 10 days of Lasix 20 milligrams orally. -Medications: Lasix IV 40 mg daily, aspirin,   -Patient has not been on beta-blockers or ACE inhibitors due to episodes of hypotension in the past.  -Chest x-ray: No evidence of increased pulmonary edema  -CTPA chest: Noted bilateral small pleural effusions  -Echo: 3/28/2017. Left ventricular systolic function was severely reduced with an ejection fraction of 15%. This is decreased from echo done 3/24/2016 with an EF of 30%. - Labs: BNP monitoring  -Strict I's and O's  -Daily weights. Patient baseline weight 180 pounds  -Consultation congestive heart failure nurse  -Cardiology consultation: Cardiology recommending echo, stress testing to further evaluate.     Essential hypertension and hyperlipidemia with known coronary artery disease status post PCI  -Medications: Aspirin,  -Statin intolerance  -Follow blood pressure curves and optimize outpatient  -Cholesterol testing    Persistent atrial fibrillation with permanent BiV pacer  -Medications: Amiodarone  -Device check completed in office on 9/3/2019  -Anticoagulation: Eliquis        DVT Prophylaxis: Eliquis  Diet: Diet NPO, After Midnight  DIET CARDIAC;  Code Status: Full Code  PT/OT Eval Status: No need identified    Dispo -expect 2 to 3 days pending clinical response to medical therapy       Norbert Rodriguez MD    Thank you Jeanie Su MD for

## 2019-09-11 NOTE — ED NOTES
Ambulated patient around entire zone 1 loop without assistance of a walker or oxygen. Patient baseline spo2 was 96% and pulse 97 bpm. During ambulation trial patient spo2 dropped to 86% but stayed consistent between 88-91% pulse was no greater than 112 bpm. Patient denies shortness of breath but states he feels \"a little woozy, and not myself\". Patient states that after getting back in bed his symptoms resolved. CNP and RN informed.       [facility]  09/11/19 6157

## 2019-09-12 NOTE — PROGRESS NOTES
K 4.3 3.6    106   CO2 22 24   BUN 28* 25*   CREATININE 1.3 1.2     Recent Labs     09/11/19  0831   AST 24   ALT 16   BILITOT 0.7   ALKPHOS 69     No results for input(s): INR in the last 72 hours. Recent Labs     09/11/19  0831   TROPONINI <0.01       Consults:     IP CONSULT TO HOSPITALIST  IP CONSULT TO HEART FAILURE NURSE/COORDINATOR  IP CONSULT TO DIETITIAN  IP CONSULT TO CARDIOLOGY    ASSESSMENT AND PLAN      Active Hospital Problems    Diagnosis Date Noted    CHF (NYHA class III, ACC/AHA stage C) [facility] [I50.9] 09/11/2019    Atrial fibrillation, persistent [facility] [I48.1] 09/03/2019    Shortness of breath [R06.02] 08/12/2019    Essential hypertension [I10] 03/05/2018    Acute on chronic systolic CHF (congestive heart failure) (HCC) [I50.23]     Coronary artery disease involving native coronary artery of native heart with angina pectoris ([facility] [I25.119] 08/11/2015    Myocardiopathy ([facility] [I42.9] 08/11/2015    Hypercholesterolemia [E78.00]      ASSESSMENT/PLAN:  Acute on chronic systolic congestive heart failure with exacerbation in the setting of well-known cardiomyopathy: Medical exam appears well compensated. Patient was last seen in the office on September 3, 2019 and instructed to take 10 days of Lasix 20 milligrams orally. -Medications: Lasix IV 40 mg daily, aspirin,   -Patient has not been on beta-blockers or ACE inhibitors due to episodes of hypotension in the past.  -Chest x-ray: No evidence of increased pulmonary edema  -CTPA chest: Noted bilateral small pleural effusions  -Echo: 9/12/2019 left ventricular ejection function severely reduced with EF 25%. Hypokinesis of apex, apical lateral, apical septum anterior septum and inferior basal walls. Left ventricular diastolic filling pressure is elevated, mild biatrial enlargement. PA pressure estimated 32 mmHg  - Labs: BNP monitoring  -Cholesterol testing: LDL greater than 122 suboptimal.  Patient had been statin intolerant previously.

## 2019-09-12 NOTE — PROGRESS NOTES
A Janice stress test was completed on this patient as ordered. The patient tolerated the procedure well. Awaiting stress imaging at this time.

## 2019-09-12 NOTE — PROGRESS NOTES
Most recent ECHO 3/27/17 showed EF=15%; elevated diastolic filling pressure; inferior wall abnls (prior EF=30% on March 2016 study). Now presents with acute SOB awakening him from sleep last night around 11AM. [place] acutely SOB and took cough drops OTC. His SOB continued and concerned him prompting him to come to ER. He denies chest pain. Received 40mg IV lasix with brisk UOP. Note RA O2 sat 96% but with ambulation dropped to 86%. Note CXR with small bilateral pleural effusions and CTPA negative for PE but pleural effusions and RML consolidation seen. Note Amee < 0.01, DKO=2910 (3086 on 8/12/19). Admit EKG shows demand PM, PVC's, probable afib (no change from 9/3/19). Note Dr. Orion Spivey saw him 9/3/19 and started amiodarone 200mg BID x 2 weeks followed by once daily dosing with goal of CV in 4 weeks on proper AC. Diagnosis of acute on chronic systolic CHF, severe primarily non-ischemic CM, and persistent afib in elderly male with extensive cardiac history. Given acuteness and severity of SOB I am concerned about ischemia possibly given PCI in 2018. ? PNA given RML consolidation on CTPA but no fever, cough, or elevated WBC.     Recs:  1. Continue IV lasix 40mg daily. Net negative 2L and feeling better. 2. I personally reviewed ECHO from 9/12/19 showing EF=25% with wall abnls LAD/CCx territory; elevated diastolic filling pressure; mild MR/AI; mild FAYE; SPAP 32mmHg (Note EF=15% in 2017 and 30% in 2016). 3. I personally reviewed Sonal Anant from 9/12/19 showing mixed ischemia and scar in LAD and CCx territory with apical AK and EF=20%  4. Continue amiodarone 200mg BID, baby aspirin qd.  5. Note is NOT on BB or ACE-I due to issues with symptomatic orthostatic hypotension in the past or statins due to myalgias  6. Given higher risk abnormal lexiscan nuclear study with flash pulm edema and acute on chronic systolic CHF I am concerned ischemia contributed to clinical presentation.  Based on these findings I recommend left and right heart cath for definitive evaluation of coronary arteries, pulm artery, and right heart pressures. Need to d/w patient and proceed if agreeable.       Patient Active Problem List   Diagnosis    HTN (hypertension)    CHF (congestive heart failure)    Cataract    DJD (degenerative joint disease)    Hypercholesterolemia    Skin lesion of face    Atrial fibrillation with RVR (HCC)    LBBB (left bundle branch block)    Coronary artery disease involving native coronary artery of native heart with angina pectoris ([address]    [facility]    Acute on chronic systolic CHF (congestive heart failure) [facility]    Coronary artery disease involving native coronary artery without angina pectoris    Essential hypertension    Biventricular ICD (implantable cardioverter-defibrillator) in place    SVT (supraventricular tachycardia) (HCC)    Abnormal stress test    Neoplasm of uncertain behavior of skin    Persistent atrial fibrillation (HCC)    Atrial tachycardia (HCC)    Paroxysmal atrial fibrillation (HCC)    Abnormal glucose    Essential hypertension    PVC (premature ventricular contraction)    Unstable angina (HCC)    NSVT (nonsustained ventricular tachycardia) (HCC)    Abnormal glucose    Dizziness    Shortness of breath    Atrial fibrillation, persistent (HCC)    CHF (NYHA class III, ACC/AHA stage C) ([facility]

## 2019-09-13 NOTE — CONSULTS
Attempted to see pt who is off the floor. Will see if time permits next week when CHF nurse returns.

## 2019-09-13 NOTE — PROGRESS NOTES
Hospitalist Progress Note      PCP: Arthur Beck MD    Date of Admission: 9/11/2019    Chief Complaint on Admission: Progressive shortness of breath       History Of Present Illness:       The patient is a 80 y.o. male who presents to [facility] with progressive shortness of breath. Patient states that he woke up abruptly at 1:00 in the morning extremely short of breath. His wife who accompanies him states that he was unable to lay down comfortably. At first he thought this was more consistent with an upper respiratory infection or cough, but his symptoms did not improve throughout the night. At 4:00 in the morning his wife gave him an oral tablet of 20 mg of Lasix as she was concerned this was most consistent with a congestive heart failure episode. He took the medicine, but had no urinary output. She was concerned that the medicine was ineffective and that he was going into heart failure and brought him into the emergency department for further medical care and evaluation.     Denies: CP or his anginal equivalents previously experienced, light headedness, palpitations, headache, visual changes. No cough, increased secretions, rhinorrhea, or fever/chills    Pt Seen/Examined and Chart Reviewed. Admitting dx:  Acute CHF exacerbation    SUBJECTIVE:   Weight 179 lbs Since admission reported 189 lbs  I/O Not recorded by nursing 24 hr  Since admission -1985    Breathing improved per patient. States no further difficulty with lying flat, orthopnea, PND. Reports improved sleep last night.     Plan: 9/13 Cardiac cath for cardiac evaluation of abnormal stress    No new medical complaints  Bedside rounding with nursing completed    OBJECTIVE:     Allergies  Flomax [tamsulosin hcl] and Statins support therapy    Medications      Scheduled Meds:   amiodarone  200 mg Oral BID    aspirin  81 mg Oral Daily    potassium chloride  20 mEq Oral BID     therapeutic multivitamin-minerals  1 tablet Oral Daily BILITOT 0.7   ALKPHOS 69     No results for input(s): INR in the last 72 hours. Recent Labs     09/11/19  0831   TROPONINI <0.01       Consults:     IP CONSULT TO HOSPITALIST  IP CONSULT TO HEART FAILURE NURSE/COORDINATOR  IP CONSULT TO DIETITIAN  IP CONSULT TO CARDIOLOGY    ASSESSMENT AND PLAN      Active Hospital Problems    Diagnosis Date Noted    Abnormal nuclear cardiac imaging test [R93.1]     CHF (NYHA class III, ACC/AHA stage C) ([facility]) [I50.9] 09/11/2019    Atrial fibrillation, persistent ([facility]) [I48.1] 09/03/2019    Shortness of breath [R06.02] 08/12/2019    Essential hypertension [I10] 03/05/2018    Acute on chronic systolic CHF (congestive heart failure) ([facility]) [I50.23]     Coronary artery disease involving native coronary artery of native heart with angina pectoris ([facility] [I25.119] 08/11/2015    Myocardiopathy ([facility] [I42.9] 08/11/2015    Hypercholesterolemia [E78.00]      ASSESSMENT/PLAN:  Acute on chronic systolic congestive heart failure with exacerbation in the setting of well-known cardiomyopathy: Medical exam appears well compensated. Patient was last seen in the office on September 3, 2019 and instructed to take 10 days of Lasix 20 milligrams orally. -Medications: Lasix IV 40 mg daily ( to be changed to oral on 9/14), aspirin,   -Patient has not been on beta-blockers or ACE inhibitors due to episodes of hypotension in the past.  -Chest x-ray: No evidence of increased pulmonary edema  -CTPA chest: Noted bilateral small pleural effusions  -Echo: 9/12/2019 left ventricular ejection function severely reduced with EF 25%. Hypokinesis of apex, apical lateral, apical septum anterior septum and inferior basal walls. Left ventricular diastolic filling pressure is elevated, mild biatrial enlargement. PA pressure estimated 32 mmHg  - Labs: BNP monitoring  -Cholesterol testing: LDL greater than 122 suboptimal.  Patient had been statin intolerant previously.   Been using over-the-counter co-Q10, red yeast rice and fish oil. Discussed with cardiology the resumption of statins.  -Strict I's and O's  -Daily weights. Patient baseline weight 180 pounds  -Consultation congestive heart failure nurse  -Nuclear stress test: 9/12/2019. Small to moderate size apical, apical and mid basal lateral wall, partial reversibility defect consistent with ischemia and infarction to the territory of the mid and distal LAD or left circumflex.  -Cardiology consultation: Cardiology recommending proceed to left heart cath 9/13. Essential hypertension and hyperlipidemia with known coronary artery disease status post PCI  -Medications: Aspirin,  -Statin intolerance  -Follow blood pressure curves and optimize outpatient  -Cholesterol testing     Persistent atrial fibrillation with permanent BiV pacer  -Medications: Amiodarone  -Device check completed in office on 9/3/2019  -Anticoagulation: Eliquis       9/13/2019 3:19 PM.  Addendum    Spoke with cardiologist Dr. Feliz Bryant who completed right and left heart cath on patient. Fluid volume status balanced at current state. Coronaries do not show any new ischemic or obstructive lesions. Status post FFR to left circumflex/LAD for 50% lesion. Stents remain patent. Overall diagnosis nonischemic cardiomyopathy. Clinical history of intolerance to both beta-blockers and ACE inhibitors provide medical suspicion for amyloid disease.   Cardiologist would like to have further discussions with family regarding further cardiac testing and treatment potential.     DVT Prophylaxis: Eliquis  Diet: Diet NPO, After Midnight  DIET CARDIAC;  Code Status: Full Code  PT/OT Eval Status: No need identified     Dispo -expect in AM 9/14 after cardiologist discussion and pending clinical response to medical therapy       Maricel Perez MD

## 2019-09-13 NOTE — PLAN OF CARE
Patient's EF (Ejection Fraction) is less than 40%    Patient's weights and intake/output reviewed:    Patient's Last Weight: 180 lbs obtained by standing scale. Difference of 0 lbs    than last documented weight. Intake/Output Summary (Last 24 hours) at 9/12/2019 2300  Last data filed at 9/12/2019 1741  Gross per 24 hour   Intake 600 ml   Output 550 ml   Net 50 ml         Patient stated Daily Functional Goal: To go home soon  Ongoing Functional Capacity Assessment:  Patient  able to ambulate distance of 15 feet in Minutes/Seconds with no difficulty. Patient noted to be on RA supplemental O2 with SpO2 of 95%. Pt resting in bed at this time on room air. Pt denies shortness of breath. Pt without lower extremity edema. Patient and/or Family's stated Goal of Care this Admission: increase activity tolerance and be more comfortable prior to discharge      Comorbidities Reviewed Yes  Patient has a past medical history of Arthritis, Atrial fibrillation ([address] Cancer ([address] Cardiomyopathy ([address] CHF (congestive heart failure) ([address] High cholesterol, Hypertension, LBBB (left bundle branch block), and MI (myocardial infarction) ([address]          >>For CHF and Comorbidity documentation on Education Time and Topics, please see Education Tab      Problem: HEMODYNAMIC STATUS  Goal: Patient has stable vital signs and fluid balance  Outcome: Met This Shift  Intervention: ASSESS PERIPHERAL, SACRAL, PERIORBITAL AND ABDOMINAL EDEMA  Note:   Assessed BLE for edema

## 2019-09-13 NOTE — PROCEDURES
present on the chart. Prior History of Anesthesia Complications:   none    Modified Mallampati:  II (soft palate, uvula, fauces visible)    ASA Classification:  Class 3 - A patient with severe systemic disease that limits activity but is not incapacitating      Bright Scale: Activity:  2 - Able to move 4 extremities voluntarily on command  Respiration:  2 - Able to breathe deeply and cough freely  Circulation:  2 - BP+/- 20mmHg of normal  Consciousness:  2 - Fully awake  Oxygen Saturation (color):  2 - Able to maintain oxygen saturation >92% on room air    Sedation/Anesthesia Plan:  Guard the patient's safety and welfare. Minimize physical discomfort and pain. Minimize negative psychological responses to treatment by providing sedation and analgesia and maximize the potential amnesia. Patient to meet pre-procedure discharge plan.     Medication Planned:  midazolam intravenously and fentanyl intravenously    Patient is an appropriate candidate for plan of sedation: yes      Electronically signed by Jarrod Sarabia MD on 9/13/2019 at 7:45 AM

## 2019-09-13 NOTE — PROGRESS NOTES
ischemia contributed to clinical presentation. Based on these findings I recommend left and right heart cath for definitive evaluation of coronary arteries, pulm artery, and right heart pressures. This morning I discussed results and risks/benefits fo cath and he agrees to proceed. Note I spoke to wife and informed her of procedure and answered questions as well.        Patient Active Problem List   Diagnosis    HTN (hypertension)    CHF (congestive heart failure)    Cataract    DJD (degenerative joint disease)    Hypercholesterolemia    Skin lesion of face    Atrial fibrillation with RVR (HCC)    LBBB (left bundle branch block)    Coronary artery disease involving native coronary artery of native heart with angina pectoris ([address]    Myocardiopathy ([address]    Acute on chronic systolic CHF (congestive heart failure) ([address]    Coronary artery disease involving native coronary artery without angina pectoris    Essential hypertension    Biventricular ICD (implantable cardioverter-defibrillator) in place    SVT (supraventricular tachycardia) (HCC)    Abnormal stress test    Neoplasm of uncertain behavior of skin    Persistent atrial fibrillation (HCC)    Atrial tachycardia (HCC)    Paroxysmal atrial fibrillation (HCC)    Abnormal glucose    Essential hypertension    PVC (premature ventricular contraction)    Unstable angina (HCC)    NSVT (nonsustained ventricular tachycardia) (HCC)    Abnormal glucose    Dizziness    Shortness of breath    Atrial fibrillation, persistent (HCC)    CHF (NYHA class III, ACC/AHA stage C) (HCC)    Abnormal nuclear cardiac imaging test

## 2019-09-14 NOTE — PROGRESS NOTES
chloride flush 0.9 % injection 10 mL  10 mL Intravenous 2 times per day Mark Vanegas MD   10 mL at 09/14/19 0828    sodium chloride flush 0.9 % injection 10 mL  10 mL Intravenous PRN Mark Vanegas MD        magnesium hydroxide (MILK OF MAGNESIA) 400 MG/5ML suspension 30 mL  30 mL Oral Daily PRN Mark Vanegas MD        ondansetron [facility]) injection 4 mg  4 mg Intravenous Q6H PRN Mark Vanegas MD        famotidine (PEPCID) tablet 20 mg  20 mg Oral Daily Mark Vanegas MD   20 mg at 09/14/19 2253    acetaminophen (TYLENOL) tablet 650 mg  650 mg Oral Q4H PRN Mark Vanegas MD           Review of Systems   Constitutional: Positive for fatigue. Negative for activity change. Respiratory: Negative for cough and shortness of breath. Cardiovascular: Negative for chest pain, palpitations and leg swelling. Neurological: Negative for dizziness, weakness and light-headedness. Psychiatric/Behavioral: Positive for sleep disturbance.        Objective:     Telemetry monitor: Paced with PVC's    Physical Exam:  Constitutional:  Comfortable and alert, NAD, appears stated age  Eyes: PERRL, sclera nonicteric  Neck:  Supple, no masses, no thyroidmegaly, no JVD  Skin:  Warm and dry; no rash or lesions  Heart:  irregular, normal apex, S1 and S2 normal, no M/G/R  Lungs:  Normal respiratory effort; clear; no wheezing/rhonchi/rales  Abdomen: soft, non tender, + bowel sounds  Extremities:  No edema or cyanosis; no clubbing  Neuro: alert and oriented, moves legs and arms equally, normal mood and affect  Right femoral site soft,dressing D/I, no hematoma, 2+ R femoral pulse, 2+ R DP/PT pulse    Data Reviewed:  EKG 9/11/2019:  Atrial fibrillation with intermittent pacingAbnormal ECGWhen compared with ECG of 18-OCT-2018 19:46,Atrial fibrillation is newConfirmed by Kim Mccoy MD, Chele Fernandes ([id number]) on 9/11/2019 6:37:38 PM    Coronary angiogram 9/13/2019:  Brief Postoperative

## 2019-09-14 NOTE — PLAN OF CARE
Problem: Safety:  Goal: Free from accidental physical injury  Description  Free from accidental physical injury  Outcome: Ongoing  Note:   Pt will remain free from falls throughout hospital stay. Fall precautions in place, bed alarm on, bed in lowest position with wheels locked and side rails 2/4 up. Room door open and hourly rounding completed. Will continue to monitor throughout shift.

## 2019-09-15 NOTE — DISCHARGE SUMMARY
of hypotension in the past.  -Echo: 9/12/2019 left ventricular ejection function severely reduced with EF 25%. Noted he did go for cardiac catheterization this admission. EF on cath was 10%. Continue medical therapy and will be on Lasix 20 mg daily. He will follow his daily weights. He is to follow with cardiology within the next 1 to 2 weeks    -Cholesterol testing: LDL greater than 122 suboptimal.  Patient had been statin intolerant previously. Been using over-the-counter co-Q10, red yeast rice and fish oil. His volume status stabilized his symptoms improved and he was stable for discharge home with continued outpatient follow-up.     Essential hypertension and hyperlipidemia with known coronary artery disease status post PCI  -Medications: Aspirin,  -Statin intolerance       Persistent atrial fibrillation with permanent BiV pacer  -Medications: Amiodarone  -Device check completed in office on 9/3/2019  -Anticoagulation: Eliquis  Will need to reschedule his cardioversion because he stopped his Eliquis for the cardiac cath. He will follow-up with cardiology within the next 1 to 2 weeks    CAD: Remains stable, he will continue on aspirin, continue Eliquis. Physical Exam Performed:     /68   Pulse 90   Temp 97.8 °F (36.6 °C) (Oral)   Resp 19   Ht 6' 1\" (1.854 m)   Wt 179 lb 12.8 oz (81.6 kg)   SpO2 95%   BMI 23.72 kg/m²       General appearance:  No apparent distress, appears stated age and cooperative. HEENT:  Normal cephalic, atraumatic without obvious deformity. Pupils equal, round, and reactive to light. Extra ocular muscles intact. Conjunctivae/corneas clear. Neck: Supple, with full range of motion. No jugular venous distention. Trachea midline. Respiratory:  Normal respiratory effort. Clear to auscultation, bilaterally without Rales/Wheezes/Rhonchi.   Cardiovascular:  Regular rate and rhythm with normal S1/S2  Abdomen: Soft, non-tender, non-distended with normal bowel

## 2019-09-16 NOTE — TELEPHONE ENCOUNTER
[facility] CHF nurse spoke to the patient's wife today and she wanted clarification of his Amiodarone dose.  Below is the instructions on patient's AVS.    Per his AVS he is to take amiodarone 200 MG tablet   TAKE 1 TABLET BY MOUTH EVERY DAY   Start Tomorrow 9/15    amiodarone 200 MG tablet   Take 0.5 tablets by mouth every other day   Start EVERY OTHER DAY   Monday 9/16

## 2019-09-16 NOTE — TELEPHONE ENCOUNTER
the doctor post discharge if they experiences shortness of breath, chest pain, swelling, cough, or weight gain or loss of 2-3 pounds in a day/5 pounds in a week. Also notified to call the doctor if he feels dizzy, increased fatigue, decreased or difficulty urinating. Reviewed the red, yellow and green zones of HF management. Educated wife on Amiodarone and last orders of 100 mg every other day starting today 9/16. Will seek clarification from [facility] cardiology providers as wife questions this dose. Pt and wife verbalized understanding. Notes indicate Home care was not started although pt would have benefited from this service. Encouraged to discuss at next PCP appointment and it can be ordered. He stated they will call the doctor with any questions or signs of symptom worsening. No additional questions at this time. HF resource number made available for non-urgent questions.     LIFESTYLE GOAL DISCUSSED: getting a new PCP nearer to their home    RECOMMENDATIONS:  Amiodarone dose clarified with pt and wife

## 2019-09-26 NOTE — LETTER
· Constitutional: there has been no unanticipated weight loss. · Eyes: wears corrective lens  · ENT: No Headaches, no nasal congestion. No mouth sores or sore throat. + [place]  · Cardiovascular: Reviewed in HPI  · Respiratory: No cough or wheezing, no sputum production. · Gastrointestinal: No abdominal pain, no constipation or diarrhea; + abdominal discomfort of fluid in abdomen  · Genitourinary: No dysuria, trouble voiding, or hematuria. · Musculoskeletal:  No weakness or joint complaints. · Integumentary: No rash or pruritis. · Neurological: No numbness or tingling. No weakness. No tremor. · Psychiatric: No anxiety, no depression. · Endocrine:  No excessive thirst or urination. · Hematologic/Lymphatic: No abnormal bruising or bleeding, blood clots or swollen lymph nodes. Physical Examination:    Vitals:    09/26/19 1503   BP: 90/60   Pulse: 78   SpO2: 96%   Weight: 181 lb (82.1 kg)   Height: 6' 1\" (1.854 m)        Constitutional and General Appearance: Warm and dry, no apparent distress, normal coloration  HEENT:  Normocephalic, atraumatic  Respiratory:  · Normal excursion and expansion without use of accessory muscles  · Resp Auscultation: Normal breath sounds without dullness  Cardiovascular:  · The apical impulses not displaced  · Heart tones are crisp and normal  · JVP 8 cm H2O  · Irregular rate and rhythm, normal S1S2, no m/g/r  · Peripheral pulses are symmetrical and full  · There is no clubbing, cyanosis of the extremities.   · No BLE edema  · Pedal Pulses: 2+ and equal     Abdomen:  · No masses or tenderness  · Liver/Spleen: No Abnormalities Noted  Neurological/Psychiatric:  · Alert and oriented in all spheres  · Moves all extremities well  · Exhibits normal gait balance and coordination  · No abnormalities of mood, affect, memory, mentation, or behavior are noted    Lab Data:  CBC:   Lab Results   Component Value Date    WBC 6.1 09/11/2019    WBC 4.8 08/12/2019    WBC 5.4 10/18/2018 ablation. INDICATION:  1. Ventricular fibrillation. 2.  Unstable angina. 3.  Coronary artery disease. Apr 2018: CARDIAC CATH   PROCEDURE PERFORMED:  1.  Bilateral coronary angiography. 2.  Left heart catheterization. 3.  FFR of mid RCA. 4.  PCI of distal circumflex extending into left PL branch with Synergy Drug-eluting stent. 5.  Moderate sedation. 6.  Ultrasound-guided right common femoral arterial access. FINDINGS:  1. Hemodynamics:  AO is 145/91, mean of 110, , LVEDP was 20 mmHg. There was no gradient across the aortic valve. 2.  LV gram was not done due to preserve the dye as the patient has severe left ventricular systolic function. Previous EF was 50% in 03/2017. CORONARY ANGIOGRAPHY:  1. Left main was a large-caliber vessel that trifurcated. Mid to distal left main had 30% diffuse stenosis. 2.  Left circumflex was a large-caliber vessel. There was no ____ noted to come off the left circumflex. In the posterior AV groove, it gave off a large PL branch that had 80% proximal stenosis, which was reduced to 0% post stent placing. The circumflex had diffuse stenosis of 40% in the mid segment. 3.  Ramus was a large-caliber vessel that bifurcated. Proximal ramus had 20% diffuse stenosis. 4. LAD was a large caliber vessel that reached the apex. Proximal LAD had 50% calcific stenosis. The LAD gave off three diagonal branches. The first was small. The second was a medium-caliber that had 90% ostial stenosis. The third was very small. 5.  Right coronary artery was a large and dominant and had a diffuse stenosis of 50% proximal and mid RCA. However, FFR across the segment was insignificant. The RCA gave off medium-caliber right posterior descending artery, which was free of significant disease. IMPRESSION:  1. Successful PCI of high-grade proximal left PL branch with Synergy drug-eluting stent. 2.  Moderate disease of proximal to mid RCA with insignificant FFR.

## 2019-09-26 NOTE — PROGRESS NOTES
hernia repair; Ankle surgery; eye surgery (2010); Cataract removal with implant (1/27/11); Cataract removal with implant (2/24/11); joint replacement (8-2008); Skin cancer excision; Cardioversion (9/25/2015); Pacemaker insertion (Left, 12/2015); and other surgical history (Bilateral, 12/16/2016). Social History:   reports that he quit smoking about 31 years ago. He has never used smokeless tobacco. He reports that he drinks alcohol. He reports that he does not use drugs. Family History:   Family History   Problem Relation Age of Onset    Diabetes Mother     Heart Failure Mother         CHF    Heart Failure Father         CHF    Diabetes Brother        Home Medications:  Prior to Admission medications    Medication Sig Start Date End Date Taking? Authorizing Provider   furosemide (LASIX) 20 MG tablet Take 1 tablet by mouth daily 9/15/19  Yes Alonzo Mohamud MD   KLOR-CON M20 20 MEQ extended release tablet TAKE 1 TABLET BY MOUTH TWICE A DAY 8/5/19  Yes Alfa Olivarez, APRN - CNP   aspirin 81 MG EC tablet Take 81 mg by mouth daily   Yes Historical Provider, MD   ELIQUIS 5 MG TABS tablet TAKE 1 TABLET BY MOUTH TWICE A DAY 4/2/19  Yes Bucky Bashir MD   Red Yeast Rice Extract (RED YEAST RICE PO) Take by mouth   Yes Historical Provider, MD   Aloe Vera 25 MG CAPS Take by mouth Not sure of dose   Yes Historical Provider, MD   CINNAMON PO Take by mouth 2 times daily    Yes Historical Provider, MD   Coenzyme Q10 (COQ-10 PO) Take  by mouth. Yes Historical Provider, MD   vitamin E 400 UNIT capsule Take 1 capsule by mouth daily. 10/7/11  Yes Jeanne Álvarez MD   amiodarone (CORDARONE) 200 MG tablet TAKE 1 TABLET BY MOUTH EVERY DAY  Patient taking differently: Take 200 mg by mouth daily  5/29/19   Colletta Fabian, MD        Allergies:  Flomax [tamsulosin hcl] and Statins support therapy     Review of Systems:   · Constitutional: there has been no unanticipated weight loss.      · Eyes: wears corrective and findings today , stronger suspecion for Amyloid. Suggest SPEP/UPEP, light chains and PYP nuclear study. - cardiac MRI                - STATIN allergy--> stain induced hepatitis                - ACE/ARB/BB--> pt has been intolerant in past due to severe orthostatic hypotension  2. Normal filling pressures with low cardiac reserves/outpt                - hold diuresis and keep I/o even for now                - would check BNP and weight given known hemodynamic to re-establish baseline. Case d/w Dr. Boyd Odonnell Test 9/12/2019:   Summary  Small-moderate sized apical, apical and mid/basal lateral wall partial  reversibility defects consistent with ischemia and infarction in the  territory of the mid and distal LAD and/or LCx. LV function is severely  reduced with apical akinesis and ejection fraction of 20%. LV cavity is  mildly dilated. High risk abnormal study. EPS/ RVA 10/18/18  1. Comprehensive electrophysiology study with left atrial pacing and recording. 2.  Electroanatomic three-dimensional intracardiac mapping. 3.  Electrophysiology study, post-drug infusion. 4.  Catheter ablation of SVT. INDICATION FOR PROCEDURE:  Recurrent SVT. FINDINGS ON ELECTROPHYSIOLOGY STUDY:  1. Sinus bradycardia with very poor AV conduction and wide left bundle branch block. 2.  Very frequent multiform PVCs. 3.  Inducible clinical SVT on isoproterenol. 4.  The SVT could be induced by pacing from the coronary sinus or from right ventricle. The retrograde activation sequence was eccentric with posteroseptal and atrial septum being activated initially. RESULTS OF RADIOFREQUENCY CATHETER ABLATION PROCEDURE:   1. Successful ablation of posteroseptal concealed decremental accessory pathway. 2.  No evidence for accessory pathway conduction or arrhythmia post ablation. INDICATION:  1. Ventricular fibrillation. 2.  Unstable angina. 3.  Coronary artery disease.     Apr 2018: CARDIAC CATH PROCEDURE PERFORMED:  1.  Bilateral coronary angiography. 2.  Left heart catheterization. 3.  FFR of mid RCA. 4.  PCI of distal circumflex extending into left PL branch with Synergy Drug-eluting stent. 5.  Moderate sedation. 6.  Ultrasound-guided right common femoral arterial access. FINDINGS:  1. Hemodynamics:  AO is 145/91, mean of 110, , LVEDP was 20 mmHg. There was no gradient across the aortic valve. 2.  LV gram was not done due to preserve the dye as the patient has severe left ventricular systolic function. Previous EF was 50% in 03/2017. CORONARY ANGIOGRAPHY:  1. Left main was a large-caliber vessel that trifurcated. Mid to distal left main had 30% diffuse stenosis. 2.  Left circumflex was a large-caliber vessel. There was no ____ noted to come off the left circumflex. In the posterior AV groove, it gave off a large PL branch that had 80% proximal stenosis, which was reduced to 0% post stent placing. The circumflex had diffuse stenosis of 40% in the mid segment. 3.  Ramus was a large-caliber vessel that bifurcated. Proximal ramus had 20% diffuse stenosis. 4. LAD was a large caliber vessel that reached the apex. Proximal LAD had 50% calcific stenosis. The LAD gave off three diagonal branches. The first was small. The second was a medium-caliber that had 90% ostial stenosis. The third was very small. 5.  Right coronary artery was a large and dominant and had a diffuse stenosis of 50% proximal and mid RCA. However, FFR across the segment was insignificant. The RCA gave off medium-caliber right posterior descending artery, which was free of significant disease. IMPRESSION:  1. Successful PCI of high-grade proximal left PL branch with Synergy drug-eluting stent. 2.  Moderate disease of proximal to mid RCA with insignificant FFR. 3.  Moderate disease of proximal LAD. 4.  Normal left ventricular filling pressure.    RECOMMENDATION:  The patient was brought to the normal.  Normal left ventricular wall thickness. Doppler study suggests diastolic dysfunction. The left atrium is mildly dilated by volume measurement. The right atrium is mildly dilated. The aortic root is mildly dilated. Aortic valve appears tricuspid and mildly sclerotic but opens adequately. There is trivial to mild tricuspid regurgitation. Systolic pulmonary artery pressure (SPAP) is normal and estimated at 38 mmHg (RA pressure 15 mmHg). Mild-to-moderate pulmonic regurgitation is present. Assessment:    1. Non-ischemic cardiomyopathy ([address]    2. Chronic systolic congestive heart failure (HCC)    3. Persistent atrial fibrillation    4. Biventricular ICD (implantable cardioverter-defibrillator) in place    5. Coronary artery disease involving native coronary artery of native heart without angina pectoris    6. Statin intolerance    7. Mixed hyperlipidemia          Plan:   1. Continue current heart medicines (Amiodarone, Eliquis, Aspirin, Lasix and potassium)  2. Proceed with cardioversion with Dr. Deanna Qureshi as scheduled on 10/17/19  3. Schedule cardiac MRI as recommended per Dr. Lay Proctor  4. Follow up with Dr. Lay Proctor in 1 month   5. Will check with device tech if your pacemaker is MRI compatible. I appreciate the opportunity of cooperating in the care of this individual.    Gregoria Cifuentes CNP, 9/26/2019, 3:32 PM    QUALITY MEASURES  1. Tobacco Cessation Counseling: NA  2. Retake of BP if >140/90:   NA  3. Documentation to PCP/referring for new patient:  Sent to PCP at close of office visit  4. CAD patient on anti-platelet: Yes  5. CAD patient on STATIN therapy:  No (statin induced hepatitis)  6.  Patient with CHF and aFib on anticoagulation:  Yes

## 2019-10-19 PROBLEM — I50.20 SYSTOLIC CHF (HCC): Status: ACTIVE | Noted: 2019-01-01

## 2019-10-31 PROBLEM — I10 ESSENTIAL HYPERTENSION: Status: RESOLVED | Noted: 2018-03-05 | Resolved: 2019-01-01

## 2019-10-31 PROBLEM — R06.02 SHORTNESS OF BREATH: Status: RESOLVED | Noted: 2019-01-01 | Resolved: 2019-01-01

## 2019-10-31 PROBLEM — R42 DIZZINESS: Status: RESOLVED | Noted: 2019-03-06 | Resolved: 2019-01-01

## 2019-10-31 PROBLEM — I48.0 PAROXYSMAL ATRIAL FIBRILLATION (HCC): Status: RESOLVED | Noted: 2017-04-19 | Resolved: 2019-01-01

## 2019-10-31 PROBLEM — R73.09 ABNORMAL GLUCOSE: Status: RESOLVED | Noted: 2018-01-15 | Resolved: 2019-01-01

## 2019-10-31 PROBLEM — I48.19 PERSISTENT ATRIAL FIBRILLATION (HCC): Status: RESOLVED | Noted: 2017-03-29 | Resolved: 2019-01-01

## 2019-10-31 PROBLEM — I49.3 PVC (PREMATURE VENTRICULAR CONTRACTION): Status: RESOLVED | Noted: 2018-04-12 | Resolved: 2019-01-01

## 2020-01-01 ENCOUNTER — HOSPITAL ENCOUNTER (INPATIENT)
Age: 85
LOS: 3 days | Discharge: HOSPICE | DRG: 064 | End: 2020-03-29
Attending: INTERNAL MEDICINE | Admitting: INTERNAL MEDICINE
Payer: MEDICARE

## 2020-01-01 ENCOUNTER — NURSE ONLY (OUTPATIENT)
Dept: CARDIOLOGY CLINIC | Age: 85
End: 2020-01-01
Payer: MEDICARE

## 2020-01-01 ENCOUNTER — OFFICE VISIT (OUTPATIENT)
Dept: FAMILY MEDICINE CLINIC | Age: 85
End: 2020-01-01
Payer: MEDICARE

## 2020-01-01 ENCOUNTER — APPOINTMENT (OUTPATIENT)
Dept: CT IMAGING | Age: 85
DRG: 064 | End: 2020-01-01
Attending: INTERNAL MEDICINE
Payer: MEDICARE

## 2020-01-01 ENCOUNTER — HOSPITAL ENCOUNTER (EMERGENCY)
Age: 85
Discharge: ANOTHER HOSPITAL | End: 2020-03-26
Attending: EMERGENCY MEDICINE
Payer: MEDICARE

## 2020-01-01 ENCOUNTER — FOLLOWUP TELEPHONE ENCOUNTER (OUTPATIENT)
Dept: TELEMETRY | Age: 85
End: 2020-01-01

## 2020-01-01 ENCOUNTER — TELEPHONE (OUTPATIENT)
Dept: CARDIOLOGY CLINIC | Age: 85
End: 2020-01-01

## 2020-01-01 ENCOUNTER — APPOINTMENT (OUTPATIENT)
Dept: CT IMAGING | Age: 85
End: 2020-01-01
Payer: MEDICARE

## 2020-01-01 ENCOUNTER — OFFICE VISIT (OUTPATIENT)
Dept: CARDIOLOGY CLINIC | Age: 85
End: 2020-01-01
Payer: MEDICARE

## 2020-01-01 ENCOUNTER — OFFICE VISIT (OUTPATIENT)
Dept: ENT CLINIC | Age: 85
End: 2020-01-01
Payer: MEDICARE

## 2020-01-01 ENCOUNTER — APPOINTMENT (OUTPATIENT)
Dept: GENERAL RADIOLOGY | Age: 85
DRG: 064 | End: 2020-01-01
Attending: INTERNAL MEDICINE
Payer: MEDICARE

## 2020-01-01 ENCOUNTER — PROCEDURE VISIT (OUTPATIENT)
Dept: AUDIOLOGY | Age: 85
End: 2020-01-01
Payer: MEDICARE

## 2020-01-01 ENCOUNTER — APPOINTMENT (OUTPATIENT)
Dept: GENERAL RADIOLOGY | Age: 85
End: 2020-01-01
Payer: MEDICARE

## 2020-01-01 VITALS
BODY MASS INDEX: 24.39 KG/M2 | WEIGHT: 184 LBS | OXYGEN SATURATION: 95 % | HEART RATE: 44 BPM | DIASTOLIC BLOOD PRESSURE: 50 MMHG | SYSTOLIC BLOOD PRESSURE: 110 MMHG | HEIGHT: 73 IN

## 2020-01-01 VITALS
HEART RATE: 70 BPM | OXYGEN SATURATION: 99 % | DIASTOLIC BLOOD PRESSURE: 72 MMHG | SYSTOLIC BLOOD PRESSURE: 108 MMHG | BODY MASS INDEX: 24.18 KG/M2 | WEIGHT: 183.25 LBS

## 2020-01-01 VITALS
SYSTOLIC BLOOD PRESSURE: 72 MMHG | HEIGHT: 72 IN | HEART RATE: 41 BPM | BODY MASS INDEX: 24.38 KG/M2 | OXYGEN SATURATION: 91 % | DIASTOLIC BLOOD PRESSURE: 48 MMHG | WEIGHT: 180 LBS

## 2020-01-01 VITALS
HEIGHT: 73 IN | OXYGEN SATURATION: 98 % | DIASTOLIC BLOOD PRESSURE: 74 MMHG | BODY MASS INDEX: 24.52 KG/M2 | HEART RATE: 69 BPM | SYSTOLIC BLOOD PRESSURE: 110 MMHG | WEIGHT: 185 LBS

## 2020-01-01 VITALS
WEIGHT: 185 LBS | OXYGEN SATURATION: 85 % | RESPIRATION RATE: 20 BRPM | HEIGHT: 73 IN | HEART RATE: 83 BPM | TEMPERATURE: 97.4 F | DIASTOLIC BLOOD PRESSURE: 96 MMHG | SYSTOLIC BLOOD PRESSURE: 137 MMHG | BODY MASS INDEX: 24.52 KG/M2

## 2020-01-01 VITALS
WEIGHT: 187.8 LBS | BODY MASS INDEX: 24.89 KG/M2 | HEART RATE: 69 BPM | SYSTOLIC BLOOD PRESSURE: 120 MMHG | DIASTOLIC BLOOD PRESSURE: 71 MMHG | HEIGHT: 73 IN

## 2020-01-01 VITALS
HEART RATE: 114 BPM | DIASTOLIC BLOOD PRESSURE: 79 MMHG | OXYGEN SATURATION: 97 % | RESPIRATION RATE: 22 BRPM | SYSTOLIC BLOOD PRESSURE: 153 MMHG | WEIGHT: 185 LBS | BODY MASS INDEX: 24.52 KG/M2 | TEMPERATURE: 98.4 F | HEIGHT: 73 IN

## 2020-01-01 LAB
ANION GAP SERPL CALCULATED.3IONS-SCNC: 16 MMOL/L (ref 3–16)
APTT: 32 SEC (ref 24.2–36.2)
BACTERIA: ABNORMAL /HPF
BANDED NEUTROPHILS RELATIVE PERCENT: 3 % (ref 0–7)
BASE EXCESS VENOUS: -3.2 MMOL/L (ref -3–3)
BASOPHILS ABSOLUTE: 0 K/UL (ref 0–0.2)
BASOPHILS RELATIVE PERCENT: 0 %
BASOPHILS RELATIVE PERCENT: 0.2 %
BASOPHILS RELATIVE PERCENT: 0.5 %
BILIRUBIN URINE: ABNORMAL
BILIRUBIN URINE: NEGATIVE
BLOOD, URINE: ABNORMAL
BLOOD, URINE: ABNORMAL
BUN BLDV-MCNC: 26 MG/DL (ref 7–20)
BUN BLDV-MCNC: 29 MG/DL (ref 7–20)
BUN BLDV-MCNC: 34 MG/DL (ref 7–20)
CALCIUM SERPL-MCNC: 8.7 MG/DL (ref 8.3–10.6)
CALCIUM SERPL-MCNC: 8.9 MG/DL (ref 8.3–10.6)
CALCIUM SERPL-MCNC: 9.5 MG/DL (ref 8.3–10.6)
CARBOXYHEMOGLOBIN: 2 % (ref 0–1.5)
CHLORIDE BLD-SCNC: 103 MMOL/L (ref 99–110)
CHLORIDE BLD-SCNC: 107 MMOL/L (ref 99–110)
CHLORIDE BLD-SCNC: 110 MMOL/L (ref 99–110)
CHOLESTEROL, TOTAL: 204 MG/DL (ref 0–199)
CLARITY: CLEAR
CLARITY: CLEAR
CO2: 18 MMOL/L (ref 21–32)
CO2: 18 MMOL/L (ref 21–32)
CO2: 21 MMOL/L (ref 21–32)
COLOR: YELLOW
COLOR: YELLOW
CREAT SERPL-MCNC: 0.7 MG/DL (ref 0.8–1.3)
CREAT SERPL-MCNC: 0.8 MG/DL (ref 0.8–1.3)
CREAT SERPL-MCNC: 1.2 MG/DL (ref 0.8–1.3)
EKG ATRIAL RATE: 92 BPM
EKG DIAGNOSIS: NORMAL
EKG P AXIS: 69 DEGREES
EKG P-R INTERVAL: 174 MS
EKG Q-T INTERVAL: 416 MS
EKG QRS DURATION: 108 MS
EKG QTC CALCULATION (BAZETT): 514 MS
EKG R AXIS: 19 DEGREES
EKG T AXIS: 258 DEGREES
EKG VENTRICULAR RATE: 92 BPM
EOSINOPHILS ABSOLUTE: 0 K/UL (ref 0–0.6)
EOSINOPHILS ABSOLUTE: 0 K/UL (ref 0–0.6)
EOSINOPHILS ABSOLUTE: 0.1 K/UL (ref 0–0.6)
EOSINOPHILS RELATIVE PERCENT: 0 %
EOSINOPHILS RELATIVE PERCENT: 0.1 %
EOSINOPHILS RELATIVE PERCENT: 0.7 %
EPITHELIAL CELLS, UA: ABNORMAL /HPF (ref 0–5)
EPITHELIAL CELLS, UA: ABNORMAL /HPF (ref 0–5)
GFR AFRICAN AMERICAN: >60
GFR NON-AFRICAN AMERICAN: 57
GFR NON-AFRICAN AMERICAN: >60
GFR NON-AFRICAN AMERICAN: >60
GLUCOSE BLD-MCNC: 119 MG/DL (ref 70–99)
GLUCOSE BLD-MCNC: 122 MG/DL (ref 70–99)
GLUCOSE BLD-MCNC: 167 MG/DL
GLUCOSE BLD-MCNC: 167 MG/DL (ref 70–99)
GLUCOSE BLD-MCNC: 170 MG/DL (ref 70–99)
GLUCOSE URINE: NEGATIVE MG/DL
GLUCOSE URINE: NEGATIVE MG/DL
HCO3 VENOUS: 22.3 MMOL/L (ref 23–29)
HCT VFR BLD CALC: 43.7 % (ref 40.5–52.5)
HCT VFR BLD CALC: 46.4 % (ref 40.5–52.5)
HCT VFR BLD CALC: 47 % (ref 40.5–52.5)
HCT VFR BLD CALC: 47.5 % (ref 40.5–52.5)
HDLC SERPL-MCNC: 45 MG/DL (ref 40–60)
HEMOGLOBIN: 15.1 G/DL (ref 13.5–17.5)
HEMOGLOBIN: 15.3 G/DL (ref 13.5–17.5)
HEMOGLOBIN: 15.4 G/DL (ref 13.5–17.5)
HEMOGLOBIN: 15.7 G/DL (ref 13.5–17.5)
INR BLD: 1.41 (ref 0.86–1.14)
KETONES, URINE: 15 MG/DL
KETONES, URINE: ABNORMAL MG/DL
LACTIC ACID: 2.2 MMOL/L (ref 0.4–2)
LDL CHOLESTEROL CALCULATED: 146 MG/DL
LEUKOCYTE ESTERASE, URINE: NEGATIVE
LEUKOCYTE ESTERASE, URINE: NEGATIVE
LV EF: 20 %
LVEF MODALITY: NORMAL
LYMPHOCYTES ABSOLUTE: 0.7 K/UL (ref 1–5.1)
LYMPHOCYTES ABSOLUTE: 0.9 K/UL (ref 1–5.1)
LYMPHOCYTES ABSOLUTE: 1.3 K/UL (ref 1–5.1)
LYMPHOCYTES RELATIVE PERCENT: 10 %
LYMPHOCYTES RELATIVE PERCENT: 11.1 %
LYMPHOCYTES RELATIVE PERCENT: 6.5 %
MCH RBC QN AUTO: 31.5 PG (ref 26–34)
MCH RBC QN AUTO: 31.5 PG (ref 26–34)
MCH RBC QN AUTO: 31.7 PG (ref 26–34)
MCH RBC QN AUTO: 32.2 PG (ref 26–34)
MCHC RBC AUTO-ENTMCNC: 32.4 G/DL (ref 31–36)
MCHC RBC AUTO-ENTMCNC: 33.1 G/DL (ref 31–36)
MCHC RBC AUTO-ENTMCNC: 33.2 G/DL (ref 31–36)
MCHC RBC AUTO-ENTMCNC: 34.6 G/DL (ref 31–36)
MCV RBC AUTO: 93 FL (ref 80–100)
MCV RBC AUTO: 95.1 FL (ref 80–100)
MCV RBC AUTO: 95.4 FL (ref 80–100)
MCV RBC AUTO: 97 FL (ref 80–100)
METHEMOGLOBIN VENOUS: 0.3 %
MICROSCOPIC EXAMINATION: YES
MICROSCOPIC EXAMINATION: YES
MONOCYTES ABSOLUTE: 0.5 K/UL (ref 0–1.3)
MONOCYTES ABSOLUTE: 1 K/UL (ref 0–1.3)
MONOCYTES ABSOLUTE: 1.1 K/UL (ref 0–1.3)
MONOCYTES RELATIVE PERCENT: 10.5 %
MONOCYTES RELATIVE PERCENT: 6.2 %
MONOCYTES RELATIVE PERCENT: 8 %
NEUTROPHILS ABSOLUTE: 10.3 K/UL (ref 1.7–7.7)
NEUTROPHILS ABSOLUTE: 6.6 K/UL (ref 1.7–7.7)
NEUTROPHILS ABSOLUTE: 8.3 K/UL (ref 1.7–7.7)
NEUTROPHILS RELATIVE PERCENT: 79 %
NEUTROPHILS RELATIVE PERCENT: 81.5 %
NEUTROPHILS RELATIVE PERCENT: 82.7 %
NITRITE, URINE: NEGATIVE
NITRITE, URINE: NEGATIVE
O2 CONTENT, VEN: 18 VOL %
O2 SAT, VEN: 75 %
O2 THERAPY: ABNORMAL
PCO2, VEN: 41.7 MMHG (ref 40–50)
PDW BLD-RTO: 15 % (ref 12.4–15.4)
PDW BLD-RTO: 15 % (ref 12.4–15.4)
PDW BLD-RTO: 15.1 % (ref 12.4–15.4)
PDW BLD-RTO: 15.2 % (ref 12.4–15.4)
PERFORMED ON: ABNORMAL
PH UA: 5 (ref 5–8)
PH UA: 7 (ref 5–8)
PH VENOUS: 7.35 (ref 7.35–7.45)
PLATELET # BLD: 162 K/UL (ref 135–450)
PLATELET # BLD: 176 K/UL (ref 135–450)
PLATELET # BLD: 193 K/UL (ref 135–450)
PLATELET # BLD: 204 K/UL (ref 135–450)
PLATELET SLIDE REVIEW: ADEQUATE
PMV BLD AUTO: 8 FL (ref 5–10.5)
PMV BLD AUTO: 8.3 FL (ref 5–10.5)
PMV BLD AUTO: 8.5 FL (ref 5–10.5)
PMV BLD AUTO: 8.9 FL (ref 5–10.5)
PO2, VEN: 41.8 MMHG (ref 25–40)
POTASSIUM REFLEX MAGNESIUM: 4.4 MMOL/L (ref 3.5–5.1)
POTASSIUM SERPL-SCNC: 3.8 MMOL/L (ref 3.5–5.1)
POTASSIUM SERPL-SCNC: 4.8 MMOL/L (ref 3.5–5.1)
PROTEIN UA: 30 MG/DL
PROTEIN UA: ABNORMAL MG/DL
PROTHROMBIN TIME: 16.2 SEC (ref 10–13.2)
RBC # BLD: 4.7 M/UL (ref 4.2–5.9)
RBC # BLD: 4.85 M/UL (ref 4.2–5.9)
RBC # BLD: 4.86 M/UL (ref 4.2–5.9)
RBC # BLD: 4.99 M/UL (ref 4.2–5.9)
RBC # BLD: NORMAL 10*6/UL
RBC UA: ABNORMAL /HPF (ref 0–4)
RBC UA: ABNORMAL /HPF (ref 0–4)
SLIDE REVIEW: ABNORMAL
SODIUM BLD-SCNC: 140 MMOL/L (ref 136–145)
SODIUM BLD-SCNC: 141 MMOL/L (ref 136–145)
SODIUM BLD-SCNC: 144 MMOL/L (ref 136–145)
SPECIFIC GRAVITY UA: 1.02 (ref 1–1.03)
SPECIFIC GRAVITY UA: >=1.03 (ref 1–1.03)
TCO2 CALC VENOUS: 24 MMOL/L
TRIGL SERPL-MCNC: 65 MG/DL (ref 0–150)
TROPONIN: 0.07 NG/ML
URINE CULTURE, ROUTINE: NORMAL
URINE REFLEX TO CULTURE: ABNORMAL
URINE TYPE: ABNORMAL
URINE TYPE: ABNORMAL
UROBILINOGEN, URINE: 0.2 E.U./DL
UROBILINOGEN, URINE: 0.2 E.U./DL
VLDLC SERPL CALC-MCNC: 13 MG/DL
WBC # BLD: 10.1 K/UL (ref 4–11)
WBC # BLD: 11.4 K/UL (ref 4–11)
WBC # BLD: 12.6 K/UL (ref 4–11)
WBC # BLD: 8.1 K/UL (ref 4–11)
WBC UA: ABNORMAL /HPF (ref 0–5)
WBC UA: ABNORMAL /HPF (ref 0–5)

## 2020-01-01 PROCEDURE — 2580000003 HC RX 258: Performed by: NURSE PRACTITIONER

## 2020-01-01 PROCEDURE — 93290 INTERROG DEV EVAL ICPMS IP: CPT | Performed by: INTERNAL MEDICINE

## 2020-01-01 PROCEDURE — 2580000003 HC RX 258: Performed by: INTERNAL MEDICINE

## 2020-01-01 PROCEDURE — 51702 INSERT TEMP BLADDER CATH: CPT

## 2020-01-01 PROCEDURE — 6360000004 HC RX CONTRAST MEDICATION: Performed by: EMERGENCY MEDICINE

## 2020-01-01 PROCEDURE — C8929 TTE W OR WO FOL WCON,DOPPLER: HCPCS

## 2020-01-01 PROCEDURE — 97535 SELF CARE MNGMENT TRAINING: CPT

## 2020-01-01 PROCEDURE — 97110 THERAPEUTIC EXERCISES: CPT

## 2020-01-01 PROCEDURE — 93005 ELECTROCARDIOGRAM TRACING: CPT | Performed by: EMERGENCY MEDICINE

## 2020-01-01 PROCEDURE — 70498 CT ANGIOGRAPHY NECK: CPT

## 2020-01-01 PROCEDURE — 99223 1ST HOSP IP/OBS HIGH 75: CPT | Performed by: PSYCHIATRY & NEUROLOGY

## 2020-01-01 PROCEDURE — 71045 X-RAY EXAM CHEST 1 VIEW: CPT

## 2020-01-01 PROCEDURE — 99232 SBSQ HOSP IP/OBS MODERATE 35: CPT | Performed by: PSYCHIATRY & NEUROLOGY

## 2020-01-01 PROCEDURE — 6370000000 HC RX 637 (ALT 250 FOR IP)

## 2020-01-01 PROCEDURE — 2580000003 HC RX 258: Performed by: EMERGENCY MEDICINE

## 2020-01-01 PROCEDURE — 80048 BASIC METABOLIC PNL TOTAL CA: CPT

## 2020-01-01 PROCEDURE — 94761 N-INVAS EAR/PLS OXIMETRY MLT: CPT

## 2020-01-01 PROCEDURE — 70450 CT HEAD/BRAIN W/O DYE: CPT

## 2020-01-01 PROCEDURE — 92557 COMPREHENSIVE HEARING TEST: CPT | Performed by: AUDIOLOGIST

## 2020-01-01 PROCEDURE — 99233 SBSQ HOSP IP/OBS HIGH 50: CPT | Performed by: PSYCHIATRY & NEUROLOGY

## 2020-01-01 PROCEDURE — 99213 OFFICE O/P EST LOW 20 MIN: CPT | Performed by: NURSE PRACTITIONER

## 2020-01-01 PROCEDURE — 99214 OFFICE O/P EST MOD 30 MIN: CPT | Performed by: INTERNAL MEDICINE

## 2020-01-01 PROCEDURE — 93284 PRGRMG EVAL IMPLANTABLE DFB: CPT | Performed by: INTERNAL MEDICINE

## 2020-01-01 PROCEDURE — 93000 ELECTROCARDIOGRAM COMPLETE: CPT | Performed by: INTERNAL MEDICINE

## 2020-01-01 PROCEDURE — 87086 URINE CULTURE/COLONY COUNT: CPT

## 2020-01-01 PROCEDURE — 1200000000 HC SEMI PRIVATE

## 2020-01-01 PROCEDURE — 92567 TYMPANOMETRY: CPT | Performed by: AUDIOLOGIST

## 2020-01-01 PROCEDURE — 36415 COLL VENOUS BLD VENIPUNCTURE: CPT

## 2020-01-01 PROCEDURE — 97530 THERAPEUTIC ACTIVITIES: CPT

## 2020-01-01 PROCEDURE — 2700000000 HC OXYGEN THERAPY PER DAY

## 2020-01-01 PROCEDURE — 99285 EMERGENCY DEPT VISIT HI MDM: CPT

## 2020-01-01 PROCEDURE — 83605 ASSAY OF LACTIC ACID: CPT

## 2020-01-01 PROCEDURE — 69210 REMOVE IMPACTED EAR WAX UNI: CPT | Performed by: OTOLARYNGOLOGY

## 2020-01-01 PROCEDURE — 81001 URINALYSIS AUTO W/SCOPE: CPT

## 2020-01-01 PROCEDURE — 85730 THROMBOPLASTIN TIME PARTIAL: CPT

## 2020-01-01 PROCEDURE — 85027 COMPLETE CBC AUTOMATED: CPT

## 2020-01-01 PROCEDURE — 84484 ASSAY OF TROPONIN QUANT: CPT

## 2020-01-01 PROCEDURE — 97166 OT EVAL MOD COMPLEX 45 MIN: CPT

## 2020-01-01 PROCEDURE — 6360000002 HC RX W HCPCS: Performed by: EMERGENCY MEDICINE

## 2020-01-01 PROCEDURE — 85025 COMPLETE CBC W/AUTO DIFF WBC: CPT

## 2020-01-01 PROCEDURE — 99203 OFFICE O/P NEW LOW 30 MIN: CPT | Performed by: OTOLARYNGOLOGY

## 2020-01-01 PROCEDURE — 97112 NEUROMUSCULAR REEDUCATION: CPT

## 2020-01-01 PROCEDURE — 6360000004 HC RX CONTRAST MEDICATION: Performed by: INTERNAL MEDICINE

## 2020-01-01 PROCEDURE — 6360000002 HC RX W HCPCS: Performed by: INTERNAL MEDICINE

## 2020-01-01 PROCEDURE — 97163 PT EVAL HIGH COMPLEX 45 MIN: CPT

## 2020-01-01 PROCEDURE — 96365 THER/PROPH/DIAG IV INF INIT: CPT

## 2020-01-01 PROCEDURE — 93010 ELECTROCARDIOGRAM REPORT: CPT | Performed by: INTERNAL MEDICINE

## 2020-01-01 PROCEDURE — 85610 PROTHROMBIN TIME: CPT

## 2020-01-01 PROCEDURE — 82803 BLOOD GASES ANY COMBINATION: CPT

## 2020-01-01 PROCEDURE — 80061 LIPID PANEL: CPT

## 2020-01-01 RX ORDER — POLYETHYLENE GLYCOL 3350 17 G/17G
17 POWDER, FOR SOLUTION ORAL DAILY PRN
Status: DISCONTINUED | OUTPATIENT
Start: 2020-01-01 | End: 2020-01-01 | Stop reason: HOSPADM

## 2020-01-01 RX ORDER — ONDANSETRON 2 MG/ML
4 INJECTION INTRAMUSCULAR; INTRAVENOUS EVERY 6 HOURS PRN
Status: DISCONTINUED | OUTPATIENT
Start: 2020-01-01 | End: 2020-01-01 | Stop reason: HOSPADM

## 2020-01-01 RX ORDER — SODIUM CHLORIDE 9 MG/ML
INJECTION, SOLUTION INTRAVENOUS CONTINUOUS
Status: DISCONTINUED | OUTPATIENT
Start: 2020-01-01 | End: 2020-01-01 | Stop reason: HOSPADM

## 2020-01-01 RX ORDER — PROMETHAZINE HYDROCHLORIDE 25 MG/1
12.5 TABLET ORAL EVERY 6 HOURS PRN
Status: DISCONTINUED | OUTPATIENT
Start: 2020-01-01 | End: 2020-01-01 | Stop reason: HOSPADM

## 2020-01-01 RX ORDER — POTASSIUM CHLORIDE 1500 MG/1
TABLET, EXTENDED RELEASE ORAL
Qty: 180 TABLET | Refills: 1 | OUTPATIENT
Start: 2020-01-01

## 2020-01-01 RX ORDER — SPIRONOLACTONE 25 MG/1
25 TABLET ORAL PRN
Qty: 45 TABLET | Refills: 1
Start: 2020-01-01 | End: 2020-01-01 | Stop reason: ALTCHOICE

## 2020-01-01 RX ORDER — ASPIRIN 81 MG/1
81 TABLET ORAL DAILY
Status: DISCONTINUED | OUTPATIENT
Start: 2020-01-01 | End: 2020-01-01 | Stop reason: HOSPADM

## 2020-01-01 RX ORDER — SODIUM CHLORIDE 0.9 % (FLUSH) 0.9 %
10 SYRINGE (ML) INJECTION EVERY 12 HOURS SCHEDULED
Status: DISCONTINUED | OUTPATIENT
Start: 2020-01-01 | End: 2020-01-01 | Stop reason: HOSPADM

## 2020-01-01 RX ORDER — ATORVASTATIN CALCIUM 40 MG/1
40 TABLET, FILM COATED ORAL NIGHTLY
Status: DISCONTINUED | OUTPATIENT
Start: 2020-01-01 | End: 2020-01-01 | Stop reason: HOSPADM

## 2020-01-01 RX ORDER — DEXTROSE AND SODIUM CHLORIDE 5; .45 G/100ML; G/100ML
INJECTION, SOLUTION INTRAVENOUS CONTINUOUS
Status: DISCONTINUED | OUTPATIENT
Start: 2020-01-01 | End: 2020-01-01

## 2020-01-01 RX ORDER — FUROSEMIDE 10 MG/ML
20 INJECTION INTRAMUSCULAR; INTRAVENOUS ONCE
Status: COMPLETED | OUTPATIENT
Start: 2020-01-01 | End: 2020-01-01

## 2020-01-01 RX ORDER — SODIUM CHLORIDE 0.9 % (FLUSH) 0.9 %
10 SYRINGE (ML) INJECTION PRN
Status: DISCONTINUED | OUTPATIENT
Start: 2020-01-01 | End: 2020-01-01 | Stop reason: HOSPADM

## 2020-01-01 RX ADMIN — AMPICILLIN SODIUM AND SULBACTAM SODIUM 3 G: 2; 1 INJECTION, POWDER, FOR SOLUTION INTRAMUSCULAR; INTRAVENOUS at 09:23

## 2020-01-01 RX ADMIN — IOPAMIDOL 75 ML: 755 INJECTION, SOLUTION INTRAVENOUS at 06:40

## 2020-01-01 RX ADMIN — Medication 10 ML: at 21:15

## 2020-01-01 RX ADMIN — SODIUM CHLORIDE: 9 INJECTION, SOLUTION INTRAVENOUS at 15:58

## 2020-01-01 RX ADMIN — DEXTROSE AND SODIUM CHLORIDE: 5; 450 INJECTION, SOLUTION INTRAVENOUS at 16:24

## 2020-01-01 RX ADMIN — FUROSEMIDE 20 MG: 10 INJECTION, SOLUTION INTRAMUSCULAR; INTRAVENOUS at 13:38

## 2020-01-01 RX ADMIN — SODIUM CHLORIDE: 9 INJECTION, SOLUTION INTRAVENOUS at 21:49

## 2020-01-01 RX ADMIN — SODIUM CHLORIDE: 9 INJECTION, SOLUTION INTRAVENOUS at 04:06

## 2020-01-01 RX ADMIN — Medication 10 ML: at 09:44

## 2020-01-01 RX ADMIN — SODIUM CHLORIDE: 9 INJECTION, SOLUTION INTRAVENOUS at 02:29

## 2020-01-01 RX ADMIN — PERFLUTREN 1.65 MG: 6.52 INJECTION, SUSPENSION INTRAVENOUS at 14:55

## 2020-01-01 RX ADMIN — SODIUM CHLORIDE: 9 INJECTION, SOLUTION INTRAVENOUS at 13:12

## 2020-01-01 RX ADMIN — Medication: at 10:39

## 2020-01-01 ASSESSMENT — ENCOUNTER SYMPTOMS
COUGH: 1
SHORTNESS OF BREATH: 1
SHORTNESS OF BREATH: 1
DIARRHEA: 1

## 2020-01-17 NOTE — PATIENT INSTRUCTIONS
Plan:  1. Stop spirolactone due to dizziness and low blood pressure   2. Hold amiodarone for now and see if dizziness improves   3. Chest blood pressure and heart rate at home daily sitting and standing. keep a log with readings- date, symptoms, and times. Try both arms to see if there is a difference in readings per arm   4. Follow up with me in one month.  Bring your readings to office visit

## 2020-01-17 NOTE — PROGRESS NOTES
Normal range of motion. He exhibits no edema. Neurological: He is alert and oriented to person, place, and time. Skin: Skin is warm and dry. Psychiatric: He has a normal mood and affect. Assessment:  1. Ischemic cardiomyopathy  2. Persistent diarrhea- improving  3. SVT- S/P RFCA for concealed posteroseptal AP  4. NSVT  5. Hypotension   6. Dizziness - may be related to hypotension. No vasodilator due to orthostatic hypotension and dizziness     Plan:  1. Stop spirolactone due to dizziness and low blood pressure   2. Hold amiodarone for now and see if dizziness improves   3. Check blood pressure and heart rate at home daily sitting and standing. keep a log with readings- date, symptoms, and times. Try both arms to see if there is a difference in readings per arm   4. Follow up with me in one month. Bring your readings to office visit     QUALITY MEASURES  1. Tobacco Cessation Counseling: NA  2. Retake of BP if >140/90:   NA  3. Documentation to PCP/referring for new patient:  Sent to PCP at close of office visit  4. CAD patient on anti-platelet: NA   5. CAD patient on STATIN therapy:  NA  6. Patient with CHF and aFib on anticoagulation:  Yes       Scribe's attestation: This note was scribed in the presence of Dr. Kunal Banerjee M.D. By Cesario Addison, Dr. Kunal Banerjee, personally performed the services described in this documentation as scribed by Everardo Quinones RN   in my presence, and it is both accurate and complete.           Kunal aBnerjee M.D.

## 2020-01-17 NOTE — LETTER
[facility]   Cardiac follow up   No chief complaint on file. HPI:  Sadiq Kaba is a 80 y.o. male who presents for follow up of non ischemic cardiomyopathy and arrhythmia. His wife stated he had an irregular rhythm for years. However the EKGs available showed normal rhythm until 08/11/2015 which showed AF. Echo from 08/11/15 showed an EF of 30-35%. Eliquis was started on 08/12/15. He underwent placement of BiV ICD 11/3/15 for primary SCA protection in the presence of NICM and class IIb CHF. 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 The antegrade slow AV puma pathway was ablated at that time. His 24 hr Holter monitor from 3/24/16 showed 41% PVC's. 60 second EKG strip in office shows very frequent PVC's of 3 distinct morphologies. He had an abnormal stress test on 07/08/16. He underwent a cardiac cath on 07/26/16 which resulted in a POBA of mid RCA. A stent could not be navigated to that area. He was started on Plavix  He was in the hospital on 3/28/17 for several days of SOB. Device check at that time showed 9 days of A fib. He underwent a cardioversion on 3/28/17. His device check today shows he has had some episodes of slow VT occurring in the evening. His device check, 3/23/2018, showed that he received an inappropriate shock on 3/9/2018 for AT. It also showed numerous episodes of tachycardia which were characterized as VT. He reported he has been taking his medications as prescribed. He denied chest pain, palpitations, syncope, dizziness, shortness of breath or edema. He underwent a left heart cath on 4/16/18 with PCI to distal LCX and PL branch. His echocardiogram from 3/28/17 demonstrated a LVEF of 30%. His device check shows his tachycardia starts with a PVC. ATP entrains A.  EGM looks conducted. This terminates with with a PAC without resetting the V.  Device check 9/24/18 shows 105 episodes of apparent SVT since August 14th. He stated he would get dizzy at times. On 10/19/18 he underwent a catheter ablation for tachycardia. This demonstrated a slow conducting concealed posteroseptal AP which was successfully ablated. His device check on 12/3/19 showed episodes of tachycardia longest  episode being 4 minutes. Amiodarone was increased to 200 mg BID on 12/4/19. Lasix was stopped due to low blood pressure. Device check today shows normal functions. Tachycardiac CL at 440-480, no ATP. He states he feels weak and has dizziness. His device check today shows normal functions. He is A paced 97% and V paced 99.55. he had some short tachy episodes, starts within V, 1:1 VA conduction. Today he states his diarrhea has slightly decreased. He stopped his amiodarone for 2 weeks as recommended at his last visit. He does not think this helped his diarrhea. He has been having dizziness often. He states he has been dizziness all day for the last 2 days, worse with position changes. His blood pressure has been low at home. Past Medical History:   has a past medical history of Arthritis, Atrial fibrillation ([address] Cancer ([address] Cardiomyopathy ([address] CHF (congestive heart failure) ([address] High cholesterol, Hypertension, LBBB (left bundle branch block), and MI (myocardial infarction) ([address] Surgical History:   has a past surgical history that includes hernia repair; Ankle surgery; eye surgery (2010); Cataract removal with implant (1/27/11); Cataract removal with implant (2/24/11); joint replacement (8-2008); Skin cancer excision; Cardioversion (9/25/2015); Pacemaker insertion (Left, 12/2015); other surgical history (Bilateral, 12/16/2016); and Cardiac catheterization (Bilateral, 09/13/2019). Social History:   reports that he quit smoking about 32 years ago.  His smoking use included Musculoskeletal: Normal range of motion. He exhibits no edema. Neurological: He is alert and oriented to person, place, and time. Skin: Skin is warm and dry. Psychiatric: He has a normal mood and affect. Assessment:  1. Ischemic cardiomyopathy  2. Persistent diarrhea- improving  3. SVT- S/P RFCA for concealed posteroseptal AP  4. NSVT  5. Hypotension   6. Dizziness - may be related to hypotension. No vasodilator due to orthostatic hypotension and dizziness     Plan:  1. Stop spirolactone due to dizziness and low blood pressure   2. Hold amiodarone for now and see if dizziness improves   3. Check blood pressure and heart rate at home daily sitting and standing. keep a log with readings- date, symptoms, and times. Try both arms to see if there is a difference in readings per arm   4. Follow up with me in one month. Bring your readings to office visit     QUALITY MEASURES  1. Tobacco Cessation Counseling: NA  2. Retake of BP if >140/90:   NA  3. Documentation to PCP/referring for new patient:  Sent to PCP at close of office visit  4. CAD patient on anti-platelet: NA   5. CAD patient on STATIN therapy:  NA  6. Patient with CHF and aFib on anticoagulation:  Yes       Scribe's attestation: This note was scribed in the presence of Dr. Isaak Sommers M.D. By Dr. Isaak Cantu, personally performed the services described in this documentation as scribed by Lisa Menendez RN   in my presence, and it is both accurate and complete.           Isaak Sommers M.D.

## 2020-01-21 NOTE — TELEPHONE ENCOUNTER
Carelink transmission shows normal sensing and pacing function. See interrogation for more details. Last check  1/17. Presenting rhythm shows AP/BP and PVC's-trigeminal.  91.2. optivol at baseline. 1 NSVT. See PACEART report under Cardiology tab.

## 2020-01-21 NOTE — TELEPHONE ENCOUNTER
Wife returned call and states she cannot get a remote transmission to go through. Gave her [employer]'s phone number to help them troubleshoot whatever may be going on with the home monitor. I offered the wife to get the patient scheduled in office for a device check if they would like. Wife is very frustrated and states this is \"doing her in.\" She said she'll talk it over with the patient and call back to make an appointment if he wishes.

## 2020-01-21 NOTE — TELEPHONE ENCOUNTER
Patient's wife called for assistance with sending a manual transmission. Walked patient's wife on how to send transmission. Mouse on the in home monitor may not be appropriately charged - patient is having issues with an error populating. Asked wife to wait 20-3- minutes and then try again. If she receives any error messages, please call back.

## 2020-01-21 NOTE — TELEPHONE ENCOUNTER
Returned patient's phone call. Patient will send in a remote transmission for Dr. Jun Velazquez' review.

## 2020-01-28 PROBLEM — E78.2 MIXED HYPERLIPIDEMIA: Status: ACTIVE | Noted: 2020-01-01

## 2020-01-28 PROBLEM — I95.1 ORTHOSTATIC HYPOTENSION: Status: ACTIVE | Noted: 2020-01-01

## 2020-01-28 NOTE — PROGRESS NOTES
(2010); Cataract removal with implant (1/27/11); Cataract removal with implant (2/24/11); joint replacement (8-2008); Skin cancer excision; Cardioversion (9/25/2015); Pacemaker insertion (Left, 12/2015); other surgical history (Bilateral, 12/16/2016); and Cardiac catheterization (Bilateral, 09/13/2019). Social History:   reports that he quit smoking about 32 years ago. His smoking use included cigarettes. He has a 132.00 pack-year smoking history. He has never used smokeless tobacco. He reports previous alcohol use. He reports that he does not use drugs. Family History:   Family History   Problem Relation Age of Onset    Diabetes Mother     Heart Failure Mother         CHF    Heart Failure Father         CHF    Diabetes Brother        Home Medications:  Prior to Admission medications    Medication Sig Start Date End Date Taking? Authorizing Provider   aspirin 81 MG EC tablet Take 81 mg by mouth daily   Yes Historical Provider, MD   ELIQUIS 5 MG TABS tablet TAKE 1 TABLET BY MOUTH TWICE A DAY 4/2/19  Yes Ke Malik MD   Red Yeast Rice Extract (RED YEAST RICE PO) Take 3 capsules by mouth daily    Yes Historical Provider, MD   CINNAMON PO Take 3 capsules by mouth daily    Yes Historical Provider, MD   Coenzyme Q10 (COQ-10 PO) Take 1 tablet by mouth daily    Yes Historical Provider, MD   vitamin E 400 UNIT capsule Take 1 capsule by mouth daily. 10/7/11  Yes Andreia Gongora MD   spironolactone (ALDACTONE) 25 MG tablet Take 1 tablet by mouth every other day  Patient not taking: Reported on 1/28/2020 11/22/19   COLIN Sharma Arm - CNP   amiodarone (CORDARONE) 200 MG tablet TAKE 1 TABLET BY MOUTH EVERY DAY  Patient not taking: Reported on 1/28/2020 5/29/19   Hugo Mckinney MD        Allergies:  Flomax [tamsulosin hcl] and Statins support therapy     Review of Systems:   Review of Systems   Constitutional: Positive for activity change and fatigue. Negative for appetite change.    HENT: Positive for RDW 14.8 10/20/2019    RDW 14.9 10/19/2019     11/14/2019     10/20/2019     10/19/2019     BMP:  Lab Results   Component Value Date     12/27/2019     12/06/2019     11/14/2019    K 4.6 12/27/2019    K 5.5 12/06/2019    K 4.1 11/14/2019    K 3.6 04/17/2018    CL 98 12/27/2019     12/06/2019     11/14/2019    CO2 23 12/27/2019    CO2 21 12/06/2019    CO2 24 11/14/2019    PHOS 3.6 11/14/2019    PHOS 3.6 10/20/2019    PHOS 3.8 03/06/2019    BUN 35 12/27/2019    BUN 32 12/06/2019    BUN 29 11/14/2019    CREATININE 1.5 12/27/2019    CREATININE 1.4 12/06/2019    CREATININE 1.4 11/14/2019     BNP:   Lab Results   Component Value Date    PROBNP 4,474 12/27/2019    PROBNP 6,222 11/14/2019    PROBNP 6,289 10/31/2019     LIPID:   Lab Results   Component Value Date    TRIG 67 09/12/2019    TRIG 77 04/16/2018    HDL 42 09/12/2019    HDL 48 04/25/2019    LDLCALC 122 09/12/2019    LDLCALC 154 04/25/2019       Cardiac Imaging:  NM CARDIAC AMYLOID 10/24/19:    Findings:       Image quality: Good. Visual scan interpretation: Mild diffuse uptake of the heart less than that of the ribs. Semiquantitative  interpretation in relation to rib uptake: Grade 1 (uptake less than rib uptake). Quantitative findings heart to contralateral lung ratio: 1.35       Ancillary findings: None. Direct current cardioversion 10/17/19:   Anesthesia: versed 0.5 mg, brevital 40 mg  Complications: none apparent  Findings: successful conversion to sinus rhythm with 150 J synchronized CV shock. Uneventful recovery.         LEFT HEART CATH 9/13/19:   LM: luminals  LAD: calcified, mid eccentric 50%,, patent LAD  LCX: mild diffuse disease, mid 50%  RCA: dominant,  Long concentric 30% in mid   LVEDP:10   LVEF: 10%, severe global hypokinesis, dilated   RIGHT HEART CATH  RA: 1  RV:  28/1  PA: 29/12      and mean of 14  PCWP:  12   Sats: on RA  Ao: 85%  RA: 52%  PA: 52%   CO/CI 4.36/2.12  SVR/PVR 1743/73

## 2020-01-30 NOTE — PROGRESS NOTES
Patient: Saul Banuelos is a 80 y.o. male who presents today with the following Chief Complaint(s):  Chief Complaint   Patient presents with   [address]:  1. Stage 3 chronic kidney disease (HCC)  Stable  Reviewed BMP from 1 month ago. BUN 35, creatinine 1.5, GFR 44. Declines nephrology. Declines medication. He has stopped nearly all of his medication because of his dizziness. Follow-up in 3 months    2. Chronic systolic congestive heart failure ([facility]  Managed by cardiology    3. Paroxysmal atrial fibrillation ([facility]  Managed by cardiology    4. Dizziness  I spent 25 minutes face to face of which greater than 50% was spent on counseling or coordination of care. Discussed with patient and his wife dizziness is likely caused by several factors including age, chronic kidney disease, heart failure, atrial fib, orthostatic hypotension and conditioning. Discussed treatment options such as strength and balance training, increasing water intake 48 ounces daily, sitting up slowly and resuming medications for his chronic conditions. He has no interest in resuming medications. He does not want to go to physical therapy but will do some exercises at home. He is to use his walker or cane. He is thankful for candid conversation in regards to his chronic conditions. KIRK Lewis is in the office for routine checkup. He has a lot of chronic health conditions mainly stage III kidney disease, chronic heart failure, atrial fib. His heart conditions are managed by cardiology. He does not see an endocrinologist for his kidneys. He has been to see his cardiologist.  Isaac Lewis thought that some of his medications were causing his symptoms. His he really did not want to take the medications anyway. His medication has been discontinued. He was on Spironolactone, amiodarone and Lasix. Dizziness has not improved. He reports when he stands up he gets very dizzy. He states he has passed out 3 times.

## 2020-02-04 NOTE — TELEPHONE ENCOUNTER
1/28/2020 NPDD     Plan:   1. Stay off the amiodarone  2. Okay to use the spironolactone only as needed for shortness of breath   3. Bring your BP monitor and readings to next visit  4.  Keep appointment with Dr. Moe Montero on 2/19/20      I do not see potassium on med list from 1/28/2020

## 2020-02-18 PROBLEM — H90.3 SENSORINEURAL HEARING LOSS, BILATERAL: Status: ACTIVE | Noted: 2020-01-01

## 2020-02-18 NOTE — PROGRESS NOTES
normal limits, no purulent drainage  EARS: Normal external appearance; on portable otomicroscopy:  -Right ear: External auditory canal without stenosis, tympanic membrane clear, no middle ear effusions or retractions  -Left ear: External auditory canal occluded with cerumen  FACE: 1/6 House-Brackmann Scale, symmetric, sensation equal bilaterally  ORAL CAVITY: No masses or lesions palpated, uvula is midline, moist mucous membranes, 0+ tonsils, edentulous  NECK: Normal range of motion, no thyromegaly, trachea is midline, no lymphadenopathy, no neck masses, no crepitus  CHEST: Normal respiratory effort, no retractions, breathing comfortably  SKIN: No rashes, normal appearing skin, no evidence of skin lesions/tumors  NEURO: CN 2, 3, 4, 5, 6, 7, 11, 12 intact bilaterally   -Юлия-Hallpike testing (posterior semicircular canal testing): no nystagmus   -Supine head roll (lateral semicircular canal testing): no nystagmus    -Head impulse testing (VOR function): not tested  -Nystagmus testing (primary, central, left gaze): physiological, no sustained nystagmus   -Skew deviation/vertical dysconjugate gaze: negative - no skew deviation  -Finger-nose testing: no past pointing  -Romberg testing: states dysequilibrium, no falls - no vertigo  -Fukuda step test: not tested    Procedure     Cerumen Debridement    Pre op: Cerumen impaction of the left ears  Post op: Normal otomicroscopic examination   Procedure : Binocular otomicroscopy with debridement of cerumen  Surgeon: SRINIVASAN Padron  Estimated Blood Loss: None    Procedure:   Binocular otomicroscopy with debridement of cerumen impaction    After obtaining consent, the patient was placed in the examination chair in the reclined position.      -Right ear: External auditory canal with no stenosis, canal clear.  Right tympanic membrane visible without without significant retractions or cholesteatoma identified, no middle ear effusions.   -Left ear: External auditory canal with no stenosis, canal skin with occluding cerumen, removed with #7 Muñoz suction. Left tympanic membrane visible without without significant retractions or cholesteatoma identified, no middle ear effusions. * Patient tolerated the procedure well with no complications    Assessment and Plan     1. Dizziness  Patient referred for dizziness/disequilibrium- awakens without vertigo/room spinning when he gets out of bed, however wife states that following breakfast, he experiences significant lightheadedness and fatigue, has poor mobility (walks with a cane/walker). In addition, he appears to have had several syncopal episodes. At last cardiology visit, it was noted that he does not drink significant water throughout the day, but does drink several cups of coffee and has diarrhea. There is concern that his symptoms may be due to dehydration - his last BUN: Creatinine ratio is 35: 1.5     On our examination, we are not able to elicit nystagmus with Юлия-Hallpike or lateral roll testing. He has a negative test of skew deviation, and no past-pointing or difficulty with rapid finger movements on cerebellar testing. Of interest, he states significant disequilibrium on Romberg testing (especially when he closes his eyes), and may have weakness with proprioception (although he does not fall). We do not believe that an inner ear etiology is responsible for his symptoms. We have offered the patient physical therapy (balance testing) which he does not wish to pursue at this time. Of note, his wife says that over the past 4-5 days he has not had any episodes of lightheadedness or fatigue, which she attributes to him stopping his amiodarone. 2. Tinnitus  Patient with unilateral tinnitus left ear, states in the past has had bilateral tinnitus however the right ear has resolved.   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

## 2020-02-18 NOTE — Clinical Note
Dr. Archer Bias you for your referral for audiometric testing on this patient. Please see the scanned audiogram (under \"Media\" tab) and encounter note for details. If you have any questions, or if there is anything else you need, please let me know. Anusha Cisnerosiologist---[facility] - Audiology

## 2020-02-18 NOTE — PATIENT INSTRUCTIONS
directly into a persons ear      Some additional items that may be helpful:   - Remain patient - this is important for both parties   - Write down items that still cannot be heard/understood. You may write with pen/paper or consider typing/texting on a cell phone or smart device. - If background noise is unavoidable, try to keep yourself in a good position in the room. By sitting at a [place] on the side of the restaurant (preferably a corner), it will be easier to communicate than if you were sitting at a table in the middle with background noise surrounding you. Keep yourself positioned away from music speakers or heavy foot traffic. Tinnitus: Overview and Management Strategies          Many people have some ringing sounds in their ears once in a while. You may hear a roar, a hiss, a tinkle, or a buzz. The sound usually lasts only a few minutes. If it goes on all the time, you may have tinnitus. Tinnitus is usually caused by long-term exposure to loud noise. This damages the nerves in the inner ear. It can occur with all types of hearing loss. It may be a symptom of almost any ear problem. Tinnitus may be caused by a buildup of earwax. Or, it may be caused by ear infections or certain medicines (especially antibiotics or large amounts of aspirin). You can also hear noises in your ears because of an injury to the ears, drinking too much alcohol or caffeine, or a medical condition. Other conditions may also contribute to tinnitus, including: head and neck trauma, temporomandibular joint disorder (TMJ), sinus pressure and barometric trauma, traumatic brain injury, metabolic disorders, autoimmune disorders, stress, and high blood pressure. You may need tests to evaluate your hearing and to find causes of long-lasting tinnitus. Your doctor may suggest one or more treatments to help you cope with the tinnitus. You can also do things at home to help reduce symptoms.     Follow-up care is a key Examples include listening to loud music, riding motorcycles, or being around other loud machines. Hearing loss can affect your work and home life. It can make you feel lonely or depressed. You may feel that you have lost your independence. But hearing aids and other devices can help you hear better and feel connected to others. Follow-up care is a key part of your treatment and safety. Be sure to make and go to all appointments, and call your doctor if you are having problems. It's also a good idea to know your test results and keep a list of the medicines you take. How can you care for yourself at home? · Avoid loud noises whenever possible. This helps keep your hearing from getting worse. Always wear hearing protection around loud noises. · If appropriate, wear hearing aid(s) as directed. It is recommended that hearing aids are worn during all waking hours to keep your brain active and give it access to the sounds it is missing. · If you are beginning your process with hearing aid(s), schedule a \"Hearing Aid Evaluation\" with an audiologist to discuss your lifestyle, features of hearing aid technology, and styles of hearing aids available. It is recommended that you contact your insurance company to determine if you have a hearing aid benefit, as this may dictate who you can see for these services. · Have hearing tests as your doctor suggests. They can show whether your hearing has changed. Your hearing aid may need to be adjusted. · Use other assistive devices as needed. These may include:  ? Telephone amplifiers and hearing aids that can connect to a television, stereo, radio, or microphone. ? Devices that use lights or vibrations. These alert you to the doorbell, a ringing telephone, or a baby monitor. ? Television closed-captioning. This shows the words at the bottom of the screen. Most new TVs can do this. ? TTY (text telephone).  This lets you type messages back and forth on the

## 2020-02-18 NOTE — PROGRESS NOTES
[facility]   Cardiac follow up   Atrial Fibrillation and Dizziness        HPI:  Jing Carlton is a 80 y.o. male who presents for follow up of non ischemic cardiomyopathy and arrhythmia. His wife stated he had an irregular rhythm for years. However the EKGs available showed normal rhythm until 08/11/2015 which showed AF. Echo from 08/11/15 showed an EF of 30-35%. Eliquis was started on 08/12/15. He underwent placement of BiV ICD 11/3/15 for primary SCA protection in the presence of NICM and class IIb CHF. 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 The antegrade slow AV puma pathway was ablated at that time. His 24 hr Holter monitor from 3/24/16 showed 41% PVC's. 60 second EKG strip in office shows very frequent PVC's of 3 distinct morphologies. He had an abnormal stress test on 07/08/16. He underwent a cardiac cath on 07/26/16 which resulted in a POBA of mid RCA. A stent could not be navigated to that area. He was started on Plavix  He was in the hospital on 3/28/17 for several days of SOB. Device check at that time showed 9 days of A fib. He underwent a cardioversion on 3/28/17. His device check today shows he has had some episodes of slow VT occurring in the evening. His device check, 3/23/2018, showed that he received an inappropriate shock on 3/9/2018 for AT. It also showed numerous episodes of tachycardia which were characterized as VT. He reported he has been taking his medications as prescribed. He denied chest pain, palpitations, syncope, dizziness, shortness of breath or edema. He underwent a left heart cath on 4/16/18 with PCI to distal LCX and PL branch. His echocardiogram from 3/28/17 demonstrated a LVEF of 30%. His device check shows his tachycardia starts with a PVC. ATP entrains A.  EGM with implant (2/24/11); joint replacement (8-2008); Skin cancer excision; Cardioversion (9/25/2015); Pacemaker insertion (Left, 12/2015); other surgical history (Bilateral, 12/16/2016); and Cardiac catheterization (Bilateral, 09/13/2019). Social History:   reports that he quit smoking about 32 years ago. His smoking use included cigarettes. He has a 132.00 pack-year smoking history. He has never used smokeless tobacco. He reports previous alcohol use. He reports that he does not use drugs. Family History:  family history includes Diabetes in his brother and mother; Heart Failure in his father and mother. Home Medications:  Outpatient Encounter Medications as of 2/19/2020   Medication Sig Dispense Refill    aspirin 81 MG EC tablet Take 81 mg by mouth daily      ELIQUIS 5 MG TABS tablet TAKE 1 TABLET BY MOUTH TWICE A DAY 60 tablet 11    Red Yeast Rice Extract (RED YEAST RICE PO) Take 3 capsules by mouth daily       CINNAMON PO Take 3 capsules by mouth daily       Coenzyme Q10 (COQ-10 PO) Take 1 tablet by mouth daily       vitamin E 400 UNIT capsule Take 1 capsule by mouth daily. No facility-administered encounter medications on file as of 2/19/2020. Allergies:  Flomax [tamsulosin hcl] and Statins support therapy     Review of Systems   Constitutional: Negative. HENT: Negative. Eyes: Negative. Respiratory: Negative. Cardiovascular: Negative. Gastrointestinal: Negative. Genitourinary: Negative. Musculoskeletal: Negative. Skin: Negative. Neurological: Negative. Hematological: Negative. Psychiatric/Behavioral: Negative. /74   Pulse 69   Ht 6' 1\" (1.854 m)   Wt 185 lb (83.9 kg)   SpO2 98%   BMI 24.41 kg/m²       Objective:  Physical Exam   Constitutional: He is oriented to person, place, and time. He appears well-developed and well-nourished. HENT:   Head: Normocephalic and atraumatic. Eyes: Pupils are equal, round, and reactive to light. Neck: Normal range of motion. JVP <4 cm   Cardiovascular: irregular rate, frequent PVCs and normal heart sounds. Pulmonary/Chest: Effort normal and breath sounds normal.   Abdominal: Soft. No tenderness. Musculoskeletal: Normal range of motion. He exhibits no edema. Neurological: He is alert and oriented to person, place, and time. Skin: Skin is warm and dry. Psychiatric: He has a normal mood and affect. Assessment:  1. Ischemic cardiomyopathy- his clinical status is clearly improved. 2. Persistent diarrhea- improving  3. SVT- S/P RFCA for concealed posteroseptal AP. He has a few episodes of tachycardia which were effectively pace terminated. 4. Dizziness - may be related to hypotension. No vasodilator due to orthostatic hypotension and dizziness     Plan:  1. Continue off the amiodarone  2. Check blood pressure and heart rate at home daily sitting and standing. keep a log with readings- date, symptoms, and times. Try both arms to see if there is a difference in readings per arm   3. Follow up with me in 3 month. 4. Bring your readings to office visit     QUALITY MEASURES  1. Tobacco Cessation Counseling: NA  2. Retake of BP if >140/90:   NA  3. Documentation to PCP/referring for new patient:  Sent to PCP at close of office visit  4. CAD patient on anti-platelet: NA   5. CAD patient on STATIN therapy:  NA  6. Patient with CHF and aFib on anticoagulation:  Yes       This note was scribed in the presence of Arianna Jacob MD by Anand Garrison RN.     I, Dr. Arianna Jacob, personally performed the services described in this documentation as scribed by Anand Garrison RN  in my presence, and it is both accurate and complete.               Arianna Jacob M.D.

## 2020-02-19 NOTE — PATIENT INSTRUCTIONS
Plan:  1. Continue off the amiodarone  2. Check blood pressure and heart rate at home daily sitting and standing. keep a log with readings- date, symptoms, and times. Try both arms to see if there is a difference in readings per arm   3. Follow up with me in 3 month.    4. Bring your readings to office visit

## 2020-02-19 NOTE — PROGRESS NOTES
Patient comes in for programming evaluation for his defibrillator. All sensing and pacing parameters are within normal range. Optivol is increased. 5 VT events recorded and treated with ATP. The last was recorded on 2/11/20 lasting 31 seconds. The longest recorded event was on 1/24/20 which was treated 7 times lasting 1 min. 14 seconds. No changes need to be made at this time. Patient will see Dr. Christopher Kumar today in office. Please see interrogation for more detail. Patient will follow up in 3 months in office or remotely. See [facility] report under the Cardiology tab.

## 2020-03-26 PROBLEM — I63.9 ACUTE CEREBROVASCULAR ACCIDENT (CVA) (HCC): Status: ACTIVE | Noted: 2020-01-01

## 2020-03-26 NOTE — ED NOTES
Patient to CT. MD spoke with patient wife.  Patient was last seen well at 2000 last night per MD.      Rosemary El RN  03/26/20 [id number]

## 2020-03-26 NOTE — ED TRIAGE NOTES
Patient brought in per [facility]. Patient looking at staff but nonverbal at this time. Flaccid left side.  Patient unable to follow commands

## 2020-03-26 NOTE — ED NOTES
Bedside report given to [facility]. MONICA. SL x2. ATB completed. Patient being transferred to [facility] at this time.       Pina Barnes RN  03/26/20 1947

## 2020-03-26 NOTE — PROGRESS NOTES
Occupational Therapy   Occupational Therapy Initial Assessment and Treatment Note  Date: 3/26/2020   Patient Name: Dinora Hernandez  MRN: [id number]     : 1/10/1932    Date of Service: 3/26/2020    Discharge Recommendations:  Continue to assess pending progress, IP Rehab     Assessment   Performance deficits / Impairments: Decreased functional mobility ; Decreased ADL status; Decreased ROM; Decreased strength;Decreased safe awareness;Decreased cognition;Decreased balance;Decreased endurance;Decreased posture;Decreased fine motor control;Decreased coordination;Decreased vision/visual deficit  Assessment: OT eval completed. Pt admitted for acute CVA with L side weakness. He was non-verbal during eval. Prior to admission, pt lived at home with wife. During OT eval, pt was able to respond with head gestures and followed basic directions to assess UE and participate in bed mobility. Pt would benefit from skilled OT services at d/c (IP rehab). Cont OT. Prognosis: Good  Decision Making: Medium Complexity  OT Education: OT Role;Plan of Care  Barriers to Learning: speech deficits, visual deficits  REQUIRES OT FOLLOW UP: Yes  Activity Tolerance  Activity Tolerance: Patient limited by fatigue  Safety Devices  Safety Devices in place: Yes  Type of devices: Nurse notified;Call light within reach; Bed alarm in place; Left in bed         Patient Diagnosis(es): There were no encounter diagnoses. has a past medical history of Arthritis, Atrial fibrillation ([address] Cancer ([address] Cardiomyopathy ([address] CHF (congestive heart failure) ([address] Dizziness, Hearing loss, High cholesterol, Hypertension, LBBB (left bundle branch block), MI (myocardial infarction) ([address] Rash, and Tinnitus. has a past surgical history that includes hernia repair; Ankle surgery; eye surgery (); Cataract removal with implant (11); Cataract removal with implant (11); joint replacement ();  Skin cancer excision; Cardioversion (2015);

## 2020-03-26 NOTE — CONSULTS
In patient Neurology consult        [facility]      MD Loco Randle Kams  1/10/1932    Date of Service: 3/26/2020    Referring Physician: Steffanie Sanarbia MD    Most of the history was obtained from detailed chart reviewing. The patient is currently confused and unable to provide me with accurate history. Reason for the consult and CC: Acute encephalopathy and left-sided weakness. Possible new stroke. HPI:   The patient is a 80y.o.  years old male with multiple medical problems breathing A. fib on Eliquis, CAD, hypertension and dementia who was admitted to the hospital from outside ED yesterday with acute encephalopathy and possible new stroke. Last seen normal was the night before admission. The patient was found unresponsive on the day of admission by his wife. Unclear duration. His wife was unable to arouse him during sleep when she noticed some difficulties breathing. He was able to follow direction from the right side but not the left. Degree was severe. No other triggers or other associated symptoms. No witnessed seizure or complaining of headache or chest pain. Initial work-up with a CT head in the ED showed no acute findings. CTA showed no large vessel occlusion and right PCA stenosis. He was admitted to [facility]. On 8  The patient is currently by himself. He opens his eyes to voice and goes back to sleep. Unable to give me any more history. Other review of system was limited. Family History   Problem Relation Age of Onset    Diabetes Mother     Heart Failure Mother         CHF    Heart Failure Father         CHF    Diabetes Brother          Past Medical History:   Diagnosis Date    Arthritis     Atrial fibrillation ([address]     Cancer ([address] about [id number]    Both [facility] had s/p Mohs 1 year apart,  in [facility]), and a different Dr.? name.     Cardiomyopathy ([address]     CHF (congestive heart failure) ([address]

## 2020-03-26 NOTE — ED NOTES
Stroke team paged at [phone], returned call at this time.           Chip Strange RN  03/26/20 [id number]

## 2020-03-26 NOTE — ED PROVIDER NOTES
arrival.  Was de-escalated to 2 L and then was taken off supplemental oxygen altogether and he maintain oxygen saturation of 94% on room air. Chest x-ray shows right midlung airspace disease concerning for pneumonia. Recommend chest radiograph in 8 weeks to confirm resolution. However as patient represent on room air, with no leukocytosis will defer antibiotic treatment to admitting team. As patient incontinent, ventura inserted  -Consulted hospitalist at [facility], spoke with Dr. Aminta Myers, regarding patient's presentation and findings. Came to a shared decision making to start patient on Unasyn here due to possible concerns of aspiration pneumonia. Will start Unasyn 3 g.    -Patient stable, hemodynamically stable upon transfer to [facility]. Labs Reviewed   CBC WITH AUTO DIFFERENTIAL - Abnormal; Notable for the following components:       Result Value    Lymphocytes Absolute 0.9 (*)     All other components within normal limits    Narrative:     Performed at:  [facility]. [facility]  [address] [place] [address]. [employer]    Phone [phone]   BASIC METABOLIC PANEL W/ REFLEX TO MG FOR LOW K - Abnormal; Notable for the following components:    Glucose 170 (*)     BUN 34 (*)     GFR Non- 57 (*)     All other components within normal limits    Narrative:     Performed at:  [facility]. [facility]  [address] [place] [address]. [facility] [place]    Phone [phone]   TROPONIN - Abnormal; Notable for the following components:    Troponin 0.07 (*)     All other components within normal limits    Narrative:     Performed at:  [facility]. [facility]  [address] [place] [address].  [place]   Phone [phone]   PROTIME-INR - Abnormal; Notable for the following components:    Protime 16.2 (*)     INR 1.41 (*)     All other components within normal limits    Narrative:     Performed at:  [facility]) - on   3/26/2020 at 07:18.           1. Acute ischemic stroke ([facility]    2. Left-sided weakness    3.  Dysarthria            Minda Hicks MD  03/26/20 [id number]       Minda Hicks MD  03/26/20 4932

## 2020-03-26 NOTE — PROGRESS NOTES
Physical Therapy    Facility/Department: [facility] C5 - MED SURG/ORTHO  Initial Assessment    NAME: Marion Last  : 1/10/1932  MRN: [id number]    Date of Service: 3/26/2020    Discharge Recommendations:  Continue to assess pending progress(ARU vs SNF)   PT Equipment Recommendations  Equipment Needed: No  Other: CTA    Assessment   Body structures, Functions, Activity limitations: Decreased functional mobility ; Decreased ROM; Decreased strength;Decreased balance;Decreased safe awareness;Decreased endurance;Decreased coordination  Assessment: Pt functioning below baseline due to CVA. Per chart review, pt was fairly indep and living at home with his wife. Now, limited command following and A x 2. Able to answer some yes/no questions and follow some commands. Unsure if confused vs fatigued vs hearing. Pt would benefit from skilled PT to address above deficits and maximize functional mobility. Treatment Diagnosis: decreased mobility   Prognosis: Fair  Decision Making: High Complexity  PT Education: Goals;PT Role;Precautions  Patient Education: pt no evidence of learning  Barriers to Learning: cognition  REQUIRES PT FOLLOW UP: Yes  Activity Tolerance  Activity Tolerance: Patient limited by fatigue;Patient limited by endurance; Patient limited by cognitive status       Patient Diagnosis(es): There were no encounter diagnoses. has a past medical history of Arthritis, Atrial fibrillation ([address] Cancer ([address] Cardiomyopathy ([address] CHF (congestive heart failure) ([address] Dizziness, Hearing loss, High cholesterol, Hypertension, LBBB (left bundle branch block), MI (myocardial infarction) ([address] Rash, and Tinnitus. has a past surgical history that includes hernia repair; Ankle surgery; eye surgery (); Cataract removal with implant (11); Cataract removal with implant (11); joint replacement (-); Skin cancer excision; Cardioversion (2015);  Pacemaker insertion (Left, 2015); other surgical history (Bilateral, 12/16/2016); and Cardiac catheterization (Bilateral, 09/13/2019). Restrictions  Restrictions/Precautions  Restrictions/Precautions: Fall Risk  Position Activity Restriction  Other position/activity restrictions: ventura  Vision/Hearing        Subjective  General  Chart Reviewed: Yes  Patient assessed for rehabilitation services?: Yes  Response To Previous Treatment: Not applicable  Family / Caregiver Present: No  Referring Practitioner: Jaylin Parker MD  Referral Date : 03/26/20  Diagnosis: CVA  Follows Commands: Within Functional Limits  General Comment  Comments: cleared by nursing  Subjective  Subjective: Pt resting in bed. Shook head no when asked about pain   Pain Screening  Patient Currently in Pain: Denies          Orientation  Orientation  Overall Orientation Status: Impaired  Orientation Level: Unable to assess(some yes/no. Able to follow simple commands)  Social/Functional History  Social/Functional History  Additional Comments: pt non-verbal. No family to provide information.  Will continue to assess  Cognition        Objective     Observation/Palpation  Posture: Poor    PROM RLE (degrees)  RLE PROM: WFL  AROM RLE (degrees)  RLE AROM: WFL  PROM LLE (degrees)  LLE PROM: WFL  AROM LLE (degrees)  LLE General AROM: no active movement  Strength RLE  Comment: unable to test due to limited command following   Strength LLE  Comment: No active movement observed  Tone RLE  RLE Tone: Normotonic  Tone LLE  LLE Tone: Hypertonic  Sensation  Overall Sensation Status: Impaired(unable to assess)  Bed mobility  Rolling to Left: Maximum assistance  Rolling to Right: Maximum assistance  Supine to Sit: Dependent/Total(x2)  Sit to Supine: Dependent/Total(x2)  Scooting: Dependent/Total  Transfers  Sit to Stand: Unable to assess  Stand to sit: Unable to assess  Ambulation  Ambulation?: No     Balance  Posture: Poor  Sitting - Static: Poor  Sitting - Dynamic: Poor  Comments: sat EOB x 5 mins needing min-max assist

## 2020-03-26 NOTE — PROGRESS NOTES
4 Eyes Skin Assessment     The patient is being assess for   Admission    I agree that 2 RN's have performed a thorough Head to Toe Skin Assessment on the patient. ALL assessment sites listed below have been assessed. Areas assessed by both nurses:   [x]   Head, Face, and Ears   [x]   Shoulders, Back, and Chest, Abdomen  [x]   Arms, Elbows, and Hands   [x]   Coccyx, Sacrum, and Ischium  [x]   Legs, Feet, and Heels        Scattered bruises, blanchable red spot on left inner knee, heels red but blanchable     **SHARE this note so that the co-signing nurse is able to place an eSignature**    Co-signer eSignature: Electronically signed by Therese Ramos RN on 3/26/20 at 4:05 PM EDT    Does the Patient have Skin Breakdown?   No          Kash Prevention initiated:  Yes   Wound Care Orders initiated:  No      [facility] nurse consulted for Pressure Injury (Stage 3,4, Unstageable, DTI, NWPT, Complex wounds)and New or Established Ostomies:  No      Primary Nurse eSignature: Electronically signed by Gavino Mcbride RN on 3/26/20 at 3:55 PM EDT

## 2020-03-26 NOTE — H&P
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 Acute on chronic systolic heart failure with LVEF of 25% on recent echo, chest x-ray showing vascular congestion, will give one-time Lasix 20 mg IV dose and monitor. Patient is not on any beta-blocker or ACE inhibitor due to history of intolerance. Paroxysmal atrial fibrillation, history of ICD in situ, rate is controlled, continue long-term anticoagulation with apixaban. CKD stage III, clinically stable, monitor. DVT Prophylaxis: Apixaban  Diet: Diet NPO Effective Now  Code Status: Full Code    PT/OT Eval Status: Ordered    Dispo -in 2 to 4 days       Erickson Candelario MD    Thank you COLIN Guardado CNP for the opportunity to be involved in this patient's care. If you have any questions or concerns please feel free to contact me at [phone].

## 2020-03-26 NOTE — ED PROVIDER NOTES
[address] [facility]      Pt Name: Jeff Lock  MRN: [id number]  [place] 1/10/1932  Date of evaluation: 3/26/2020  Provider: Boris Hirsch DO    CHIEF COMPLAINT       Chief Complaint   Patient presents with    Cerebrovascular Accident     brought in per [facility] with left sided weakness. Unknown last known well. Left arm flaccid, left leg unable to move. patient is nonverbal. Unable to assess swallowing evaulation d/t inability to speak. HISTORY OF PRESENT ILLNESS   (Location/Symptom, Timing/Onset, Context/Setting, Quality, Duration, Modifying Factors, Severity)  Note limiting factors. Jeff Lock is a 80 y.o. male with history of atrial fibrillation on [place], [place], [facility] who presents to the emergency department with concern for unresponsiveness. I talked to his wife on the phone and he is unable to provide any history. States his last known well time was at 8 PM last night. He was eating and watching jeopardy. He then went to bed. Around 2 AM she noted that he was breathing funny. This morning around 6 AM when he woke up she was unable to arouse him. EMS was called and when they got there if they shouted he would open his eyes. He is able to squeeze my right hand when I asked him to do so and nod yes and no occasionally however history is limited and unsure how accurate responses. Patient has no known previous deficits. Wife reports that he occasionally has been walking with a cane but recently has been able to walk without one. He is able to perform most of his activities of daily living at home. Nursing Notes were reviewed. PAST MEDICAL HISTORY     Past Medical History:   Diagnosis Date    Arthritis     Atrial fibrillation ([facility]     Cancer ([facility] about 6044-5155    Both [facility] had s/p Mohs 1 year apart,  in [facility]), and a different Dr.? name.     Cardiomyopathy ([facility]     CHF Packs/day: 3.00     Years: 44.00     Pack years: 132.00     Types: Cigarettes     Last attempt to quit: 1988     Years since quittin.2    Smokeless tobacco: Never Used   Substance and Sexual Activity    Alcohol use: Not Currently     Alcohol/week: 0.0 standard drinks     Comment: 1 can beer a month    Drug use: No    Sexual activity: Not Currently   Lifestyle    Physical activity     Days per week: None     Minutes per session: None    Stress: None   Relationships    Social connections     Talks on phone: None     Gets together: None     Attends Gnosticism service: None     Active member of club or organization: None     Attends meetings of clubs or organizations: None     Relationship status: None    Intimate partner violence     Fear of current or ex partner: None     Emotionally abused: None     Physically abused: None     Forced sexual activity: None   Other Topics Concern    None   Social History Narrative    None       SCREENINGS   NIH Stroke Scale  Level of Consciousness (1a. ): Not alert, requires repeated stimulation to attend  LOC Questions (1b. ): (nonverbal)  LOC Commands (1c. ): (unable to follow commands)  Best Gaze (2. ): (unable to assess)  Visual (3. ): (unable to assess)  Motor Arm, Left (5a. ): No movement  Motor Arm, Right (5b. ): Drift, but does not hit bed  Motor Leg, Left (6a. ): No movement  Motor Leg, Right (6b. ): Some effort against gravity  Limb Ataxia (7. ): (unable to assess)  Sensory (8. ): (unable to assess)  Best Language (9. ): (unable to assess)  Dysarthria (10. ): (unable to assess)  Extinction and Inattention (11): (unable to assess)Jefferson Coma Scale  Eye Opening: Spontaneous  Best Verbal Response: Incomprehensible speech  Best Motor Response: Obeys commands  Jefferson Coma Scale Score: 12          Review of Systems  Unable to assess    Except as noted above the remainder of the review of systems was reviewed and negative.        PHYSICAL EXAM    (up to 7 for intubated or other physical barrier  11. Extinction and Inattention:  0 - no abnormality    TOTAL:  21      DIAGNOSTIC RESULTS     EKG: Demand pacemaker, ventricular paced rhythm, rate 92, prolonged QRS, prolonged QT    All EKG's are interpreted by the Emergency Department Physician who either signs or Co-signs this chart in the absence of a cardiologist.      RADIOLOGY:   Interpretation per the Radiologist below, if available at the time of this note:    CT Head WO Contrast    (Results Pending)   CTA NECK W CONTRAST    (Results Pending)   XR CHEST PORTABLE    (Results Pending)   CTA HEAD NECK W CONTRAST    (Results Pending)         LABS:  Labs Reviewed   CBC WITH AUTO DIFFERENTIAL - Abnormal; Notable for the following components:       Result Value    Lymphocytes Absolute 0.9 (*)     All other components within normal limits    Narrative:     Performed at:  [facility]  [address] [place] [address]. [employer]   Phone [phone]   BASIC METABOLIC PANEL W/ REFLEX TO MG FOR LOW K - Abnormal; Notable for the following components:    Glucose 170 (*)     BUN 34 (*)     GFR Non- 57 (*)     All other components within normal limits    Narrative:     Performed at:  [facility]  [address] [place] [address]. [employer]   Phone [phone]   TROPONIN - Abnormal; Notable for the following components:    Troponin 0.07 (*)     All other components within normal limits    Narrative:     Performed at:  [facility]  [address] [place] [address]. [employer]   Phone [phone]   PROTIME-INR - Abnormal; Notable for the following components:    Protime 16.2 (*)     INR 1.41 (*)     All other components within normal limits    Narrative:     Performed at:  [facility]  [address] [place] [address].  [employer]   Phone [phone] stated that if the patient's heart were to stop she and he would not want chest compressions. Initially she stated that if there is a respiratory issue that the patient would not want a breathing tube. When I discussed with her that he may need an intubation just to go to either the [facility], [facility]. or [facility] for a procedure for his stroke she states she would be open to patient getting a breathing tube in order to get into the procedure. We will repeat the CTA head and neck and await lab work studies. Discussed with neurology again and repeat CTA head and neck did not show any large vessel occlusion. ED crit    CRITICAL CARE:  The high probability of sudden, clinically significant deterioration in the patient's condition required the highest level of my preparedness to intervene urgently. The services I provided to this patient were to treat and/or prevent clinically significant deterioration that could result in:neurological collapse. Services included the following: chart data review, reviewing nursing notes and/or old charts, documentation time, consultant collaboration regarding findings and treatment options, medication orders and management, direct patient care, re-evaluations, vital sign assessments and ordering, interpreting and reviewing diagnostic studies/lab tests. Aggregate critical care time was 35 minutes, which includes only time during which I was engaged in work directly related to the patient's care, as described above, whether at the bedside or elsewhere in the Emergency Department. It did not include time spent performing other reported procedures or the services of residents, students, nurses or physician assistants. CONSULTS:  None      FINAL IMPRESSION      1. Acute ischemic stroke (HCC)          DISPOSITION/PLAN   DISPOSITION        PATIENT REFERRED TO:  No follow-up provider specified.     DISCHARGE MEDICATIONS:  New Prescriptions    No medications

## 2020-03-27 NOTE — CARE COORDINATION
Therapy approached SW about the Pt and their recs. They are recommending that the Pt go to ARU. SW noted that there is also a Palliative order as well. SW spoke with [facility] Palliative care nurse. She reported that she will not speak with the Pt/family until Monday. She reported that Dr. Narendra Sears reported that they are not ready for decsions at this time. SW will continue to follow to help with DC arrangements.   SONNY Smart

## 2020-03-27 NOTE — PROGRESS NOTES
x-ray showing vascular congestion, will give one-time Lasix 20 mg IV dose and monitor. Patient is not on any beta-blocker or ACE inhibitor due to history of intolerance.     Paroxysmal atrial fibrillation, history of ICD in situ, rate is controlled, continue long-term anticoagulation with apixaban. Holding apixaban in view of possible large stroke.     CKD stage III, clinically stable, monitor.     DVT Prophylaxis: Lovenox  Diet: Diet NPO Effective Now  Code Status: Full Code    PT/OT Eval Status: Ordered    Dispo -2 to 4 days    Jaylin Parker MD

## 2020-03-27 NOTE — PROGRESS NOTES
mL/hr at 03/26/20 2149       Allergies   Allergen Reactions    Flomax [Tamsulosin Hcl]      Lost bm control --violent vomitting --per wife       Statins Support Therapy      Statin induced hepatitis      reports that he quit smoking about 32 years ago. His smoking use included cigarettes. He has a 132.00 pack-year smoking history. He has never used smokeless tobacco. He reports previous alcohol use. He reports that he does not use drugs. Objective:  Constitutional:   Vitals:    03/26/20 2300 03/27/20 0339 03/27/20 0930 03/27/20 1200   BP: (!) 135/91 (!) 149/93 (!) 148/91 132/87   Pulse: 77 83 101 78   Resp: 18 18 24 24   Temp: 97.2 °F (36.2 °C) 99.1 °F (37.3 °C) 99 °F (37.2 °C) 98.2 °F (36.8 °C)   TempSrc: Oral Axillary Axillary Axillary   SpO2: 94% 92% 94% 94%   Weight:       Height:           General appearance: Confused   Eye: No icterus. PRRR  Neck: supple  Cardiovascular:          No lower leg edema with good pulsation. Mental Status: The patient is waxing and waning. He opens eyes to voice and goes back to sleep  Cannot follow directions. Poor attention and concentration  Unable to assess language, memory or fund of knowledge. Cranial Nerves:   II: Visual fields: NT due to confusion. Pupils: equal, round, reactive to light  III,IV,VI: right  gaze preference. No nystagmus  V: Facial sensation : NT due to confusion  VII: Facial strength and movements: intact and symmetric  VIII: Hearing: NT due to confusion  IX: Palate elevation NT due to confusion  XI: Shoulder shrug NT due to confusion  XII: Tongue movements : midline  Musculoskeletal:  The patient can move his right side but not moving his left side. Tone: Normal tone. No rigidity. Reflexes: 1-2 in arms and legs  Planters: flexor bilaterally.   Coordination: NT due to confusion  Sensation: NT due to confusion  Gait/Posture: NT due to confusion        Data:  LABS:   Lab Results   Component Value Date     03/26/2020    K 4.4 03/26/2020     03/26/2020    CO2 21 03/26/2020    BUN 34 03/26/2020    CREATININE 1.2 03/26/2020    GFRAA >60 03/26/2020    GFRAA >60 09/28/2012    LABGLOM 57 03/26/2020    GLUCOSE 167 03/26/2020    PHOS 3.6 11/14/2019    MG 2.40 12/27/2019    CALCIUM 9.5 03/26/2020     Lab Results   Component Value Date    WBC 11.4 03/27/2020    RBC 4.70 03/27/2020    HGB 15.1 03/27/2020    HCT 43.7 03/27/2020    MCV 93.0 03/27/2020    RDW 15.1 03/27/2020     03/27/2020     Lab Results   Component Value Date    INR 1.41 (H) 03/26/2020    PROTIME 16.2 (H) 03/26/2020       Neuroimaging and labs were independently reviewed by me  Reviewed notes from different physicians. Impression: no change  Acute encephalopathy in the setting of left-sided weakness, severe. Likely consistent with large right ischemic hemispheric CVA. Paroxysmal A. fib  Hypertension  Pacemaker  Hyperlipidemia         Recommendation  Continue current supportive care  Aspiration precautions  Neurochecks  Repeat CT head at some point since he can not have MRI brain  Aspirin DC  DVT and GI prophylaxis  Hold Eliquis  Telemetry  ISS and BS monitor if needed  Blood pressure monitor and avoid hypotension  Prognosis is guarded and poor  Consider palliative consultation if encephalopathy does not improve over the next 48 to 72 hours. Speech and swallow evaluation if more awake and alert. Cici English MD   [phone]      This dictation was generated by voice recognition computer software. Although all attempts are made to edit the dictation for accuracy, there may be errors in the transcription that are not intended.

## 2020-03-27 NOTE — PROGRESS NOTES
Physical Therapy  Facility/Department: [facility] C5 - MED SURG/ORTHO  Daily Treatment Note  NAME: Jewell Murguia  : 1/10/1932  MRN: [id number]    Date of Service: 3/27/2020    Discharge Recommendations:  Subacute/Skilled Nursing Facility   PT Equipment Recommendations  Equipment Needed: No(defer)    Assessment   Body structures, Functions, Activity limitations: Decreased functional mobility ; Decreased ROM; Decreased strength;Decreased balance;Decreased safe awareness;Decreased endurance;Decreased coordination  Assessment: Pt with very limited command following this date and decreased mobility compared to yesterday, very minimally verbal, very lethargic with difficulty keeping eyes open. Pt required max A x 2 for bed mobility except rolling R, which was Tyler. Pt reuired max A x1-2 for sitting balance and ther ex on LLE was limited to PROM, but able to perform some AROM and AAROM ther ex and some command following RLE. Pt very fatigued at EOS  Treatment Diagnosis: decreased mobility   Prognosis: Fair  Decision Making: High Complexity  PT Education: Goals;PT Role;Precautions; Functional Mobility Training;Home Exercise Program  Patient Education: pt no evidence of learning  Barriers to Learning: cognition  REQUIRES PT FOLLOW UP: Yes  Activity Tolerance  Activity Tolerance: Patient limited by fatigue;Patient limited by endurance; Patient limited by cognitive status     Patient Diagnosis(es): There were no encounter diagnoses. has a past medical history of Arthritis, Atrial fibrillation ([address] Cancer ([address] Cardiomyopathy ([address] CHF (congestive heart failure) ([address] Dizziness, Hearing loss, High cholesterol, Hypertension, LBBB (left bundle branch block), MI (myocardial infarction) ([address] Rash, and Tinnitus. has a past surgical history that includes hernia repair; Ankle surgery; eye surgery (); Cataract removal with implant (11); Cataract removal with implant (11); joint replacement ();  Skin cancer 1514)  AM-PAC Inpatient T-Scale Score : 28.52 (03/27/20 1514)  Mobility Inpatient CMS 0-100% Score: 86.62 (03/27/20 1514)  Mobility Inpatient CMS G-Code Modifier : CM (03/27/20 1514)          Goals  Short term goals  Time Frame for Short term goals: 4/3  Short term goal 1: Pt will sit EOB x 5 minutes with min A  Short term goal 2: Pt will transfer supine to sit wtih mod A x 1  Short term goal 3: Pt will participate in B LE exercises by 3/30  Short term goal 4: Pt will roll to the L with  min A, to the R with mod A  Short term goal 5: Pt will sit to stand to stedy with min A x 2  Patient Goals   Patient goals : none expressed    Plan    Plan  Times per week: 5-7x/week   Times per day: Daily  Current Treatment Recommendations: Strengthening, Balance Training, Functional Mobility Training, Endurance Training, Transfer Training, Neuromuscular Re-education, Positioning  Safety Devices  Type of devices: All fall risk precautions in place, Bed alarm in place, Call light within reach, Patient at risk for falls, Left in bed, Nurse notified  Restraints  Initially in place: No     Therapy Time   Individual Concurrent Group Co-treatment   Time In 1408         Time Out 1443         Minutes 35              If pt is discharged prior to next therapy session, this note will serve as discharge summary.     Gail Warren, PT

## 2020-03-28 NOTE — PROGRESS NOTES
Note sent to hospitalists at  [phone] : Patient is having apnea. Patient has declined from earlier today. Mental status has changed, non-verbal now and not following commands at times. Patients O2 has declined and it needing 2L currently. Heart rate has increased from 70-80's to over 100 now. Patient had CT at 2100. Patient seems to be declining. 80years old and full code.

## 2020-03-28 NOTE — PROGRESS NOTES
Physical Therapy and Occupational Therapy    Chart reviewed and it appears pt has been declining significantly. RN informed to hold therapy for today d/t pt decline. Will re-attempt at later time/date if pt becomes medically appropriate for therapy.      Travon Cordon, PT  Rena Horan, OTR/L

## 2020-03-28 NOTE — PROGRESS NOTES
[facility]:       Patient is eligible for Hospice Services with Diagnosis of CVA. Met with spouse/DPOA-Allegra to discuss Hospice philosophy and services. Offered for pt to go to [facility] however Guerline Castañeda replied \"I think I'm piecing things together but no one has ever really told me how they think things are going to go. \" Explained with hospice no IVF however Guerline Castañeda did ask about tube feedings. \"He would like one if it means he could live. \" Requests Dr Edgar Ramirez call her in morning with \"direct\" thoughts. [facility] to follow up tomorrow. Spoke with TARA Nur re above plan. Thank you for the opportunity to care for this patient and family.                   Cori Reza RN    [phone]

## 2020-03-29 NOTE — PROGRESS NOTES
[facility]:        [facility]. PTS to  at 1300. Spoke with Heather Sherman, and Juan Duarte  DP re: plan. Report called to unit. Thank you for the opportunity to care for this patient and family. Aurora Snyder RN   [phone].

## 2020-03-29 NOTE — PROGRESS NOTES
light. Conjunctivae/corneas clear. Neck: Supple, . No jugular venous distention. Trachea midline. Respiratory: Bilateral breath sounds, decreased at both bases, without Rales/Wheezes/Rhonchi. No use of accessory muscle at this time. Cardiovascular: Regular rate and rhythm with normal S1/S2   Abdomen: Soft, non-tender, non-distended with + bowel sounds. Musculoskeletal: No clubbing, cyanosis , trace lower extremity edema bilaterally. Neurologic: Dense left-sided weakness, he will occasionally move right side, motor strength right side is 2/5, unable to speak  Psychiatric: Unable to perform full examination. He does seem confused and very lethargic at this time  Capillary Refill: Brisk,< 3 seconds   Peripheral Pulses: +2 palpable, equal bilaterally       Labs:   Recent Labs     03/27/20  0632 03/28/20  0649 03/29/20  0653   WBC 11.4* 12.6* 10.1   HGB 15.1 15.3 15.4   HCT 43.7 47.0 46.4    176 162     Recent Labs     03/28/20  0627 03/29/20  0653    144   K 4.8 3.8    110   CO2 18* 18*   BUN 26* 29*   CREATININE 0.7* 0.8   CALCIUM 8.9 8.7     No results for input(s): AST, ALT, BILIDIR, BILITOT, ALKPHOS in the last 72 hours. No results for input(s): INR in the last 72 hours. No results for input(s): Sherice Vásquez in the last 72 hours. Urinalysis:      Lab Results   Component Value Date    NITRU Negative 03/29/2020    WBCUA 0-2 03/29/2020    BACTERIA 1+ 03/29/2020    RBCUA 11-20 03/29/2020    BLOODU MODERATE 03/29/2020    SPECGRAV >=1.030 03/29/2020    GLUCOSEU Negative 03/29/2020       Radiology:  XR CHEST PORTABLE   Final Result   Persistent right midlung airspace opacity could represent pneumonia. However, follow-up to ensure resolution suggested         CT HEAD WO CONTRAST   Final Result   Evolving infarcts involving both occipital lobes and cerebellum, right   greater than left. No significant mass effect, herniation, or intracranial   hemorrhage. oxygen. As noted he has congestive heart failure with LVEF of 20 to 25% with global hypokinesis.     I did call his wife Ehsan Rivera at [phone] again this morning to further discuss goals of care. I did inform her again of the poor prognosis and his continuing decline. She did inform me that they have a living will and his wishes are to be DNR. She stated no intubation no CPR at this time. DNR order has been put in. On the discussion we had this morning she is willing to further discuss with hospice and is considering hospice  inpatient unit. I will communicate with nursing to have hospice [facility] contact Ehsan Rivera to make further arrangements.   If possible we will try to discharge to inpatient hospice today.       DVT Prophylaxis: scd  Diet: Diet NPO Effective Now  Code Status: DNR-CCA    PT/OT Eval Status: Consulted    Dispo -1 to 2 days possibly to inpatient hospice    Viky Jenkins MD

## 2020-03-29 NOTE — PROGRESS NOTES
Efe Larios [facility] RN here to D/C patient to inpatient unit, per [facility] RN all lines are to stay in place.  Patient transport picked up @ [id number]

## 2020-03-29 NOTE — DISCHARGE INSTR - COC
Encounters:   20 185 lb (83.9 kg)     Mental Status:  {IP PT MENTAL STATUS:83964}    IV Access:  { KARY IV ACCESS:617106157}    Nursing Mobility/ADLs:  Walking   {CHP DME QFOP:957743440}  Transfer  {CHP DME GNHF:592911630}  Bathing  {CHP DME CRJ}  Dressing  {CHP DME QTMH:670692705}  Toileting  {CHP DME YPBU:554598608}  Feeding  {CHP DME XZRK:636593848}  Med Admin  {CHP DME ZDIC:230681063}  Med Delivery   { KARY MED Delivery:472100203}    Wound Care Documentation and Therapy:        Elimination:  Continence:   · Bowel: {YES / FF:55306}  · Bladder: {YES / ZQ:89644}  Urinary Catheter: {Urinary Catheter:846728522}   Colostomy/Ileostomy/Ileal Conduit: {YES / XO:93768}       Date of Last BM: ***    Intake/Output Summary (Last 24 hours) at 3/29/2020 1314  Last data filed at 3/28/2020 1551  Gross per 24 hour   Intake --   Output 200 ml   Net -200 ml     I/O last 3 completed shifts:  In: -   Out: 200 [Urine:200]    Safety Concerns:     [address] [employer] Safety Concerns:137621846}    Impairments/Disabilities:      [address] [employer] Impairments/Disabilities:394339703}    Nutrition Therapy:  Current Nutrition Therapy:   [address] [employer] Diet List:824987807}    Routes of Feeding: {[facility] DME Other Feedings:542108397}  Liquids: {Slp liquid thickness:87513}  Daily Fluid Restriction: {CHP DME Yes amt example:549799145}  Last Modified Barium Swallow with Video (Video Swallowing Test): {Done Not Done OFIY:702112929}    Treatments at the Time of Hospital Discharge:   Respiratory Treatments: ***  Oxygen Therapy:  {Therapy; copd oxygen:80241}  Ventilator:    { CC Vent SGAH:594340596}    Rehab Therapies: {THERAPEUTIC INTERVENTION:7553239329}  Weight Bearing Status/Restrictions: [address]  Weight Bearin}  Other Medical Equipment (for information only, NOT a DME order):  {EQUIPMENT:009486105}  Other Treatments: ***    Patient's personal belongings (please select all that are sent with patient):  {[facility] DME Belongings:167359215}    RN SIGNATURE:

## 2020-03-29 NOTE — DISCHARGE SUMMARY
Hospital Medicine Discharge Summary    Patient ID: Bipin Felderu      Patient's PCP: COLIN Mac CNP    Admit Date: 3/26/2020     Discharge Date: 3/29/2020      Admitting Physician: Racquel Cordon MD     Discharge Physician: Susan Milton MD     Discharge Diagnoses: Active Hospital Problems    Diagnosis    [facility] [I63.9]    Acute encephalopathy [G93.40]    Stage 3 chronic kidney disease ([facility] [N18.3]    CHF (NYHA class III, ACC/AHA stage C) ([facility] [I50.9]    PAF (paroxysmal atrial fibrillation) ([facility]) [I48.0]    Biventricular ICD (implantable cardioverter-defibrillator) in place [Z95.810]    HTN (hypertension), benign [I10]       The patient was seen and examined on day of discharge and this discharge summary is in conjunction with any daily progress note from day of discharge. Hospital Course:     66-year-old male past medical history significant for paroxysmal atrial fibrillation, has been on long-term anticoagulation with Eliquis, CAD, hypertension, reported history of dementia. He was admitted with increasing confusion/acute encephalopathy and possible CVA.  He was last seen in his usual state of health the night before his admission. He was noted with left-sided weakness and increasing confusion. Repeat CT scan head done on 3/27/2020 did reveal evolving infarcts involving both occipital lobes and cerebellum, right greater than left. His condition continued to decline, he was not able to follow commands, he was not able to speak or verbalize. He was not able to swallow any food or drink any water. Discussion was had with his wife who is the POA and other family members. Wife did state patient has living will did not want to be intubated did not want any artificial means of life support.   We did have discussion of hospice and hospice was consulted patient was discharged to inpatient hospice unit.     Course per issues    Acute CVA with left-sided hemiplegia: he was  admited to telemetry, CT head and CTA head and neck done in the emergency room at [facility] showing no acute infarction. Repeat CT scan head done on 3/27/2020 did reveal evolving infarcts involving both occipital lobes and cerebellum, right greater than left. As noted his condition continued to decline, neurological status continued to worsen. We were  unable to do MRI of brain due to having pacemaker in place. Neurology was  consulted during this admission. We did continue aspirin and statin. He did clinically continue to do poorly, and his condition continued to decline.     He was high risk for aspiration and he was kept n.p.o.     Acute on chronic systolic heart failure with LVEF of 25% on recent echo, chest x-ray did reveal vascular congestion, did give one-time Lasix 20 mg IV dose and monitored.       Paroxysmal atrial fibrillation, history of ICD in situ, he was on long-term anticoagulation with apixaban.  Held apixaban in view of possible large stroke.     CKD stage III, clinically stable, monitor.        Overall prognosis for meaningful full recovery was quite grim.       I did call his wife Junior Morales at [phone] [facility] this morning to further discuss goals of care. I did inform her again of the poor prognosis and his continuing decline. She did inform me that they have a living will and his wishes are to be DNR. She stated no intubation no CPR at this time. DNR order was put in. I did discuss possible hospice with her she was agreeable to this and hospice was consulted. Patient was discharged to inpatient hospice unit       Physical Exam Performed:     BP (!) 153/79   Pulse 114   Temp 98.4 °F (36.9 °C) (Axillary)   Resp 22   Ht 6' 1\" (1.854 m)   Wt 185 lb (83.9 kg)   SpO2 97%   BMI 24.41 kg/m²     General appearance: He is lying in bed, he is very weak and debilitated.   He is quite confused but will occasionally follow simple commands  HEENT: Pupils equal, round, and reactive to light. Conjunctivae/corneas clear. Neck: Supple, . No jugular venous distention. Trachea midline. Respiratory: Bilateral breath sounds, decreased at both bases, without Rales/Wheezes/Rhonchi.  No use of accessory muscle at this time. Cardiovascular: Regular rate and rhythm with normal S1/S2   Abdomen: Soft, non-tender, non-distended with + bowel sounds. Musculoskeletal: No clubbing, cyanosis , trace lower extremity edema bilaterally.    Neurologic: Dense left-sided weakness, he will occasionally move right side, motor strength right side is 2/5, unable to speak  Psychiatric: Unable to perform full examination. Drew Laura does seem confused and very lethargic at this time  Capillary Refill: Brisk,< 3 seconds   Peripheral Pulses: +2 palpable, equal bilaterally       Labs: For convenience and continuity at follow-up the following most recent labs are provided:      CBC:    Lab Results   Component Value Date    WBC 10.1 03/29/2020    HGB 15.4 03/29/2020    HCT 46.4 03/29/2020     03/29/2020       Renal:    Lab Results   Component Value Date     03/29/2020    K 3.8 03/29/2020    K 4.4 03/26/2020     03/29/2020    CO2 18 03/29/2020    BUN 29 03/29/2020    CREATININE 0.8 03/29/2020    CALCIUM 8.7 03/29/2020    PHOS 3.6 11/14/2019         Significant Diagnostic Studies    Radiology:   XR CHEST PORTABLE   Final Result   Persistent right midlung airspace opacity could represent pneumonia. However, follow-up to ensure resolution suggested         CT HEAD WO CONTRAST   Final Result   Evolving infarcts involving both occipital lobes and cerebellum, right   greater than left. No significant mass effect, herniation, or intracranial   hemorrhage. Consults:     IP CONSULT TO NEUROLOGY  IP CONSULT TO PALLIATIVE CARE  IP CONSULT TO HOSPICE    Disposition:   To inpatient Hospice unit     Condition at Discharge: Terminal    Discharge Instructions/Follow-up: He is being discharged to hospice    Code Status:  DNR-CCA     Activity: activity as tolerated          Discharge Medications:     Discharge Medication List as of 3/29/2020  1:14 PM          Time Spent on discharge is more than 45 minutes in the examination, evaluation, counseling and review of medications and discharge plan. Signed:    Sintia Hernandez MD   3/29/2020      Thank you COLIN Díaz CNP for the opportunity to be involved in this patient's care. If you have any questions or concerns please feel free to contact me at [phone].

## 2020-03-30 NOTE — TELEPHONE ENCOUNTER
Care transition faxed to COLIN Vitale CNP. Care transition included reason for hospitalization, procedures performed during hospitalization, services provided during hospitalization, discharge medications, and follow-up treatment and services needed.